# Patient Record
Sex: FEMALE | Race: ASIAN | NOT HISPANIC OR LATINO | Employment: FULL TIME | URBAN - METROPOLITAN AREA
[De-identification: names, ages, dates, MRNs, and addresses within clinical notes are randomized per-mention and may not be internally consistent; named-entity substitution may affect disease eponyms.]

---

## 2018-10-15 LAB
ABO + RH BLD: NORMAL
ABO + RH BLD: NORMAL
HBV SURFACE AG SERPL QL IA: NORMAL
HIV 1+2 AB+HIV1 P24 AG SERPL QL IA: NORMAL
RUBELLA ANTIBODY IGG QUANTITATIVE: NORMAL IU/ML
TREPONEMA ANTIBODIES: NORMAL

## 2019-03-14 ENCOUNTER — HOSPITAL ENCOUNTER (INPATIENT)
Facility: CLINIC | Age: 39
LOS: 4 days | Discharge: HOME HEALTH | End: 2019-03-18
Attending: OBSTETRICS & GYNECOLOGY | Admitting: OBSTETRICS & GYNECOLOGY
Payer: OTHER GOVERNMENT

## 2019-03-14 ENCOUNTER — ANESTHESIA (OUTPATIENT)
Dept: OBGYN | Facility: CLINIC | Age: 39
End: 2019-03-14
Payer: OTHER GOVERNMENT

## 2019-03-14 ENCOUNTER — ANESTHESIA EVENT (OUTPATIENT)
Dept: OBGYN | Facility: CLINIC | Age: 39
End: 2019-03-14
Payer: OTHER GOVERNMENT

## 2019-03-14 DIAGNOSIS — Z98.891 S/P CESAREAN SECTION: Primary | ICD-10-CM

## 2019-03-14 LAB
ABO + RH BLD: NORMAL
ABO + RH BLD: NORMAL
BLD GP AB SCN SERPL QL: NORMAL
BLOOD BANK CMNT PATIENT-IMP: NORMAL
RUPTURE OF FETAL MEMBRANES BY ROM PLUS: POSITIVE
SPECIMEN EXP DATE BLD: NORMAL
T PALLIDUM AB SER QL: NONREACTIVE

## 2019-03-14 PROCEDURE — 27210794 ZZH OR GENERAL SUPPLY STERILE: Performed by: OBSTETRICS & GYNECOLOGY

## 2019-03-14 PROCEDURE — 71000012 ZZH RECOVERY PHASE 1 LEVEL 1 FIRST HR: Performed by: OBSTETRICS & GYNECOLOGY

## 2019-03-14 PROCEDURE — 59025 FETAL NON-STRESS TEST: CPT

## 2019-03-14 PROCEDURE — 25000132 ZZH RX MED GY IP 250 OP 250 PS 637

## 2019-03-14 PROCEDURE — 37000008 ZZH ANESTHESIA TECHNICAL FEE, 1ST 30 MIN: Performed by: OBSTETRICS & GYNECOLOGY

## 2019-03-14 PROCEDURE — 86900 BLOOD TYPING SEROLOGIC ABO: CPT | Performed by: OBSTETRICS & GYNECOLOGY

## 2019-03-14 PROCEDURE — 0064U ANTB TP TOTAL&RPR IA QUAL: CPT | Performed by: OBSTETRICS & GYNECOLOGY

## 2019-03-14 PROCEDURE — 25000125 ZZHC RX 250: Performed by: NURSE ANESTHETIST, CERTIFIED REGISTERED

## 2019-03-14 PROCEDURE — 80361 OPIATES 1 OR MORE: CPT | Performed by: OBSTETRICS & GYNECOLOGY

## 2019-03-14 PROCEDURE — 25000128 H RX IP 250 OP 636: Performed by: OBSTETRICS & GYNECOLOGY

## 2019-03-14 PROCEDURE — 25800030 ZZH RX IP 258 OP 636: Performed by: OBSTETRICS & GYNECOLOGY

## 2019-03-14 PROCEDURE — 84112 EVAL AMNIOTIC FLUID PROTEIN: CPT | Performed by: OBSTETRICS & GYNECOLOGY

## 2019-03-14 PROCEDURE — 80307 DRUG TEST PRSMV CHEM ANLYZR: CPT | Performed by: OBSTETRICS & GYNECOLOGY

## 2019-03-14 PROCEDURE — 25000128 H RX IP 250 OP 636: Performed by: NURSE ANESTHETIST, CERTIFIED REGISTERED

## 2019-03-14 PROCEDURE — 86850 RBC ANTIBODY SCREEN: CPT | Performed by: OBSTETRICS & GYNECOLOGY

## 2019-03-14 PROCEDURE — G0463 HOSPITAL OUTPT CLINIC VISIT: HCPCS | Mod: 25

## 2019-03-14 PROCEDURE — 25000125 ZZHC RX 250: Performed by: OBSTETRICS & GYNECOLOGY

## 2019-03-14 PROCEDURE — 12000035 ZZH R&B POSTPARTUM

## 2019-03-14 PROCEDURE — 37000009 ZZH ANESTHESIA TECHNICAL FEE, EACH ADDTL 15 MIN: Performed by: OBSTETRICS & GYNECOLOGY

## 2019-03-14 PROCEDURE — 25800030 ZZH RX IP 258 OP 636: Performed by: NURSE ANESTHETIST, CERTIFIED REGISTERED

## 2019-03-14 PROCEDURE — 36000056 ZZH SURGERY LEVEL 3 1ST 30 MIN: Performed by: OBSTETRICS & GYNECOLOGY

## 2019-03-14 PROCEDURE — 25000132 ZZH RX MED GY IP 250 OP 250 PS 637: Performed by: OBSTETRICS & GYNECOLOGY

## 2019-03-14 PROCEDURE — 25000128 H RX IP 250 OP 636

## 2019-03-14 PROCEDURE — 86901 BLOOD TYPING SEROLOGIC RH(D): CPT | Performed by: OBSTETRICS & GYNECOLOGY

## 2019-03-14 PROCEDURE — 36415 COLL VENOUS BLD VENIPUNCTURE: CPT | Performed by: OBSTETRICS & GYNECOLOGY

## 2019-03-14 PROCEDURE — 36000058 ZZH SURGERY LEVEL 3 EA 15 ADDTL MIN: Performed by: OBSTETRICS & GYNECOLOGY

## 2019-03-14 PROCEDURE — 71000013 ZZH RECOVERY PHASE 1 LEVEL 1 EA ADDTL HR: Performed by: OBSTETRICS & GYNECOLOGY

## 2019-03-14 RX ORDER — FENTANYL CITRATE 50 UG/ML
INJECTION, SOLUTION INTRAMUSCULAR; INTRAVENOUS PRN
Status: DISCONTINUED | OUTPATIENT
Start: 2019-03-14 | End: 2019-03-14

## 2019-03-14 RX ORDER — MORPHINE SULFATE 1 MG/ML
INJECTION, SOLUTION EPIDURAL; INTRATHECAL; INTRAVENOUS
Status: DISCONTINUED
Start: 2019-03-14 | End: 2019-03-14 | Stop reason: HOSPADM

## 2019-03-14 RX ORDER — AMLODIPINE BESYLATE 5 MG/1
5 TABLET ORAL DAILY
Status: DISCONTINUED | OUTPATIENT
Start: 2019-03-14 | End: 2019-03-18 | Stop reason: HOSPADM

## 2019-03-14 RX ORDER — CEFAZOLIN SODIUM 2 G/100ML
INJECTION, SOLUTION INTRAVENOUS
Status: DISCONTINUED
Start: 2019-03-14 | End: 2019-03-14 | Stop reason: HOSPADM

## 2019-03-14 RX ORDER — LIDOCAINE 40 MG/G
CREAM TOPICAL
Status: DISCONTINUED | OUTPATIENT
Start: 2019-03-14 | End: 2019-03-18 | Stop reason: HOSPADM

## 2019-03-14 RX ORDER — BUPIVACAINE HYDROCHLORIDE 7.5 MG/ML
INJECTION, SOLUTION INTRASPINAL
Status: COMPLETED
Start: 2019-03-14 | End: 2019-03-14

## 2019-03-14 RX ORDER — SIMETHICONE 80 MG
80 TABLET,CHEWABLE ORAL 4 TIMES DAILY PRN
Status: DISCONTINUED | OUTPATIENT
Start: 2019-03-14 | End: 2019-03-18 | Stop reason: HOSPADM

## 2019-03-14 RX ORDER — HYDROCHLOROTHIAZIDE 25 MG/1
50 TABLET ORAL EVERY 24 HOURS
Status: ON HOLD | COMMUNITY
Start: 2014-11-26 | End: 2021-08-05

## 2019-03-14 RX ORDER — ONDANSETRON 2 MG/ML
INJECTION INTRAMUSCULAR; INTRAVENOUS
Status: DISCONTINUED
Start: 2019-03-14 | End: 2019-03-14 | Stop reason: HOSPADM

## 2019-03-14 RX ORDER — NALOXONE HYDROCHLORIDE 0.4 MG/ML
.1-.4 INJECTION, SOLUTION INTRAMUSCULAR; INTRAVENOUS; SUBCUTANEOUS
Status: DISCONTINUED | OUTPATIENT
Start: 2019-03-14 | End: 2019-03-18 | Stop reason: HOSPADM

## 2019-03-14 RX ORDER — SODIUM CHLORIDE, SODIUM LACTATE, POTASSIUM CHLORIDE, CALCIUM CHLORIDE 600; 310; 30; 20 MG/100ML; MG/100ML; MG/100ML; MG/100ML
INJECTION, SOLUTION INTRAVENOUS CONTINUOUS
Status: DISCONTINUED | OUTPATIENT
Start: 2019-03-14 | End: 2019-03-14 | Stop reason: HOSPADM

## 2019-03-14 RX ORDER — ONDANSETRON 2 MG/ML
INJECTION INTRAMUSCULAR; INTRAVENOUS PRN
Status: DISCONTINUED | OUTPATIENT
Start: 2019-03-14 | End: 2019-03-14

## 2019-03-14 RX ORDER — AMLODIPINE BESYLATE 10 MG/1
10 TABLET ORAL DAILY
COMMUNITY

## 2019-03-14 RX ORDER — PHYTONADIONE 1 MG/.5ML
INJECTION, EMULSION INTRAMUSCULAR; INTRAVENOUS; SUBCUTANEOUS
Status: DISCONTINUED
Start: 2019-03-14 | End: 2019-03-14 | Stop reason: HOSPADM

## 2019-03-14 RX ORDER — ACETAMINOPHEN 325 MG/1
975 TABLET ORAL EVERY 8 HOURS
Status: COMPLETED | OUTPATIENT
Start: 2019-03-14 | End: 2019-03-17

## 2019-03-14 RX ORDER — EPHEDRINE SULFATE 50 MG/ML
5 INJECTION, SOLUTION INTRAMUSCULAR; INTRAVENOUS; SUBCUTANEOUS
Status: DISCONTINUED | OUTPATIENT
Start: 2019-03-14 | End: 2019-03-18 | Stop reason: HOSPADM

## 2019-03-14 RX ORDER — FENTANYL CITRATE 50 UG/ML
INJECTION, SOLUTION INTRAMUSCULAR; INTRAVENOUS
Status: DISCONTINUED
Start: 2019-03-14 | End: 2019-03-14 | Stop reason: HOSPADM

## 2019-03-14 RX ORDER — CEFAZOLIN SODIUM 1 G/3ML
1 INJECTION, POWDER, FOR SOLUTION INTRAMUSCULAR; INTRAVENOUS SEE ADMIN INSTRUCTIONS
Status: DISCONTINUED | OUTPATIENT
Start: 2019-03-14 | End: 2019-03-14 | Stop reason: HOSPADM

## 2019-03-14 RX ORDER — CEFAZOLIN SODIUM 2 G/100ML
2 INJECTION, SOLUTION INTRAVENOUS
Status: COMPLETED | OUTPATIENT
Start: 2019-03-14 | End: 2019-03-14

## 2019-03-14 RX ORDER — OXYTOCIN 10 [USP'U]/ML
10 INJECTION, SOLUTION INTRAMUSCULAR; INTRAVENOUS
Status: DISCONTINUED | OUTPATIENT
Start: 2019-03-14 | End: 2019-03-18 | Stop reason: HOSPADM

## 2019-03-14 RX ORDER — IBUPROFEN 400 MG/1
800 TABLET, FILM COATED ORAL EVERY 6 HOURS PRN
Status: DISCONTINUED | OUTPATIENT
Start: 2019-03-14 | End: 2019-03-18 | Stop reason: HOSPADM

## 2019-03-14 RX ORDER — HYDROCORTISONE 2.5 %
CREAM (GRAM) TOPICAL 3 TIMES DAILY PRN
Status: DISCONTINUED | OUTPATIENT
Start: 2019-03-14 | End: 2019-03-18 | Stop reason: HOSPADM

## 2019-03-14 RX ORDER — OXYTOCIN/0.9 % SODIUM CHLORIDE 30/500 ML
100 PLASTIC BAG, INJECTION (ML) INTRAVENOUS CONTINUOUS
Status: DISCONTINUED | OUTPATIENT
Start: 2019-03-14 | End: 2019-03-18 | Stop reason: HOSPADM

## 2019-03-14 RX ORDER — KETOROLAC TROMETHAMINE 30 MG/ML
30 INJECTION, SOLUTION INTRAMUSCULAR; INTRAVENOUS EVERY 6 HOURS
Status: COMPLETED | OUTPATIENT
Start: 2019-03-14 | End: 2019-03-15

## 2019-03-14 RX ORDER — BUPIVACAINE HYDROCHLORIDE 7.5 MG/ML
INJECTION, SOLUTION INTRASPINAL PRN
Status: DISCONTINUED | OUTPATIENT
Start: 2019-03-14 | End: 2019-03-14

## 2019-03-14 RX ORDER — ONDANSETRON 2 MG/ML
4 INJECTION INTRAMUSCULAR; INTRAVENOUS EVERY 6 HOURS PRN
Status: DISCONTINUED | OUTPATIENT
Start: 2019-03-14 | End: 2019-03-18 | Stop reason: HOSPADM

## 2019-03-14 RX ORDER — CITRIC ACID/SODIUM CITRATE 334-500MG
30 SOLUTION, ORAL ORAL
Status: COMPLETED | OUTPATIENT
Start: 2019-03-14 | End: 2019-03-14

## 2019-03-14 RX ORDER — MORPHINE SULFATE 1 MG/ML
INJECTION, SOLUTION EPIDURAL; INTRATHECAL; INTRAVENOUS PRN
Status: DISCONTINUED | OUTPATIENT
Start: 2019-03-14 | End: 2019-03-14

## 2019-03-14 RX ORDER — ACETAMINOPHEN 325 MG/1
TABLET ORAL
Status: COMPLETED
Start: 2019-03-14 | End: 2019-03-14

## 2019-03-14 RX ORDER — OXYTOCIN/0.9 % SODIUM CHLORIDE 30/500 ML
PLASTIC BAG, INJECTION (ML) INTRAVENOUS CONTINUOUS PRN
Status: DISCONTINUED | OUTPATIENT
Start: 2019-03-14 | End: 2019-03-14

## 2019-03-14 RX ORDER — NALBUPHINE HYDROCHLORIDE 10 MG/ML
2.5-5 INJECTION, SOLUTION INTRAMUSCULAR; INTRAVENOUS; SUBCUTANEOUS EVERY 6 HOURS PRN
Status: DISCONTINUED | OUTPATIENT
Start: 2019-03-14 | End: 2019-03-18 | Stop reason: HOSPADM

## 2019-03-14 RX ORDER — KETOROLAC TROMETHAMINE 30 MG/ML
INJECTION, SOLUTION INTRAMUSCULAR; INTRAVENOUS
Status: DISCONTINUED
Start: 2019-03-14 | End: 2019-03-14 | Stop reason: HOSPADM

## 2019-03-14 RX ORDER — BISACODYL 10 MG
10 SUPPOSITORY, RECTAL RECTAL DAILY PRN
Status: DISCONTINUED | OUTPATIENT
Start: 2019-03-16 | End: 2019-03-18 | Stop reason: HOSPADM

## 2019-03-14 RX ORDER — HYDROMORPHONE HYDROCHLORIDE 1 MG/ML
INJECTION, SOLUTION INTRAMUSCULAR; INTRAVENOUS; SUBCUTANEOUS
Status: COMPLETED
Start: 2019-03-14 | End: 2019-03-14

## 2019-03-14 RX ORDER — MULTIVITAMIN WITH IRON
1 TABLET ORAL DAILY
Status: ON HOLD | COMMUNITY
End: 2019-03-18

## 2019-03-14 RX ORDER — DEXTROSE, SODIUM CHLORIDE, SODIUM LACTATE, POTASSIUM CHLORIDE, AND CALCIUM CHLORIDE 5; .6; .31; .03; .02 G/100ML; G/100ML; G/100ML; G/100ML; G/100ML
INJECTION, SOLUTION INTRAVENOUS CONTINUOUS
Status: DISCONTINUED | OUTPATIENT
Start: 2019-03-14 | End: 2019-03-18 | Stop reason: HOSPADM

## 2019-03-14 RX ORDER — HYDROMORPHONE HYDROCHLORIDE 1 MG/ML
.3-.5 INJECTION, SOLUTION INTRAMUSCULAR; INTRAVENOUS; SUBCUTANEOUS EVERY 30 MIN PRN
Status: DISCONTINUED | OUTPATIENT
Start: 2019-03-14 | End: 2019-03-18 | Stop reason: HOSPADM

## 2019-03-14 RX ORDER — LANOLIN 100 %
OINTMENT (GRAM) TOPICAL
Status: DISCONTINUED | OUTPATIENT
Start: 2019-03-14 | End: 2019-03-18 | Stop reason: HOSPADM

## 2019-03-14 RX ORDER — OXYTOCIN/0.9 % SODIUM CHLORIDE 30/500 ML
340 PLASTIC BAG, INJECTION (ML) INTRAVENOUS CONTINUOUS PRN
Status: DISCONTINUED | OUTPATIENT
Start: 2019-03-14 | End: 2019-03-18 | Stop reason: HOSPADM

## 2019-03-14 RX ORDER — AMOXICILLIN 250 MG
2 CAPSULE ORAL 2 TIMES DAILY PRN
Status: DISCONTINUED | OUTPATIENT
Start: 2019-03-14 | End: 2019-03-18 | Stop reason: HOSPADM

## 2019-03-14 RX ORDER — OXYTOCIN/0.9 % SODIUM CHLORIDE 30/500 ML
PLASTIC BAG, INJECTION (ML) INTRAVENOUS
Status: DISCONTINUED
Start: 2019-03-14 | End: 2019-03-14 | Stop reason: HOSPADM

## 2019-03-14 RX ORDER — AMOXICILLIN 250 MG
1 CAPSULE ORAL 2 TIMES DAILY PRN
Status: DISCONTINUED | OUTPATIENT
Start: 2019-03-14 | End: 2019-03-18 | Stop reason: HOSPADM

## 2019-03-14 RX ORDER — ACETAMINOPHEN 325 MG/1
975 TABLET ORAL ONCE
Status: COMPLETED | OUTPATIENT
Start: 2019-03-14 | End: 2019-03-14

## 2019-03-14 RX ORDER — CITRIC ACID/SODIUM CITRATE 334-500MG
SOLUTION, ORAL ORAL
Status: COMPLETED
Start: 2019-03-14 | End: 2019-03-14

## 2019-03-14 RX ORDER — ERYTHROMYCIN 5 MG/G
OINTMENT OPHTHALMIC
Status: DISCONTINUED
Start: 2019-03-14 | End: 2019-03-14 | Stop reason: HOSPADM

## 2019-03-14 RX ORDER — HYDROCHLOROTHIAZIDE 50 MG/1
50 TABLET ORAL EVERY 24 HOURS
Status: DISCONTINUED | OUTPATIENT
Start: 2019-03-14 | End: 2019-03-18 | Stop reason: HOSPADM

## 2019-03-14 RX ORDER — ACETAMINOPHEN 325 MG/1
650 TABLET ORAL EVERY 4 HOURS PRN
Status: DISCONTINUED | OUTPATIENT
Start: 2019-03-17 | End: 2019-03-18 | Stop reason: HOSPADM

## 2019-03-14 RX ORDER — OXYCODONE HYDROCHLORIDE 5 MG/1
5-10 TABLET ORAL
Status: DISCONTINUED | OUTPATIENT
Start: 2019-03-14 | End: 2019-03-18 | Stop reason: HOSPADM

## 2019-03-14 RX ADMIN — Medication 0.5 MG: at 20:19

## 2019-03-14 RX ADMIN — CEFAZOLIN SODIUM 2 G: 2 INJECTION, SOLUTION INTRAVENOUS at 09:13

## 2019-03-14 RX ADMIN — KETOROLAC TROMETHAMINE 30 MG: 30 INJECTION, SOLUTION INTRAMUSCULAR; INTRAVENOUS at 16:01

## 2019-03-14 RX ADMIN — Medication 0.3 MG: at 11:39

## 2019-03-14 RX ADMIN — KETOROLAC TROMETHAMINE 30 MG: 30 INJECTION, SOLUTION INTRAMUSCULAR; INTRAVENOUS at 10:36

## 2019-03-14 RX ADMIN — FENTANYL CITRATE 20 MCG: 50 INJECTION, SOLUTION INTRAMUSCULAR; INTRAVENOUS at 09:14

## 2019-03-14 RX ADMIN — SODIUM CITRATE AND CITRIC ACID MONOHYDRATE 30 ML: 500; 334 SOLUTION ORAL at 08:55

## 2019-03-14 RX ADMIN — BUPIVACAINE HYDROCHLORIDE IN DEXTROSE 10.5 MG: 7.5 INJECTION, SOLUTION SUBARACHNOID at 09:14

## 2019-03-14 RX ADMIN — ACETAMINOPHEN 975 MG: 325 TABLET ORAL at 08:05

## 2019-03-14 RX ADMIN — PHENYLEPHRINE HYDROCHLORIDE 50 MCG: 10 INJECTION, SOLUTION INTRAMUSCULAR; INTRAVENOUS; SUBCUTANEOUS at 09:27

## 2019-03-14 RX ADMIN — PHENYLEPHRINE HYDROCHLORIDE 200 MCG: 10 INJECTION, SOLUTION INTRAMUSCULAR; INTRAVENOUS; SUBCUTANEOUS at 09:28

## 2019-03-14 RX ADMIN — KETOROLAC TROMETHAMINE 30 MG: 30 INJECTION, SOLUTION INTRAMUSCULAR; INTRAVENOUS at 22:45

## 2019-03-14 RX ADMIN — AZITHROMYCIN MONOHYDRATE 500 MG: 500 INJECTION, POWDER, LYOPHILIZED, FOR SOLUTION INTRAVENOUS at 08:23

## 2019-03-14 RX ADMIN — SODIUM CHLORIDE, POTASSIUM CHLORIDE, SODIUM LACTATE AND CALCIUM CHLORIDE: 600; 310; 30; 20 INJECTION, SOLUTION INTRAVENOUS at 08:19

## 2019-03-14 RX ADMIN — AMLODIPINE BESYLATE 5 MG: 5 TABLET ORAL at 20:47

## 2019-03-14 RX ADMIN — OXYTOCIN-SODIUM CHLORIDE 0.9% IV SOLN 30 UNIT/500ML 100 ML/HR: 30-0.9/5 SOLUTION at 13:31

## 2019-03-14 RX ADMIN — ONDANSETRON 4 MG: 2 INJECTION INTRAMUSCULAR; INTRAVENOUS at 09:02

## 2019-03-14 RX ADMIN — SODIUM CHLORIDE, POTASSIUM CHLORIDE, SODIUM LACTATE AND CALCIUM CHLORIDE 1000 ML: 600; 310; 30; 20 INJECTION, SOLUTION INTRAVENOUS at 07:56

## 2019-03-14 RX ADMIN — ACETAMINOPHEN 975 MG: 325 TABLET, FILM COATED ORAL at 08:05

## 2019-03-14 RX ADMIN — FENTANYL CITRATE 30 MCG: 50 INJECTION, SOLUTION INTRAMUSCULAR; INTRAVENOUS at 09:32

## 2019-03-14 RX ADMIN — SODIUM CHLORIDE, SODIUM LACTATE, POTASSIUM CHLORIDE, CALCIUM CHLORIDE AND DEXTROSE MONOHYDRATE: 5; 600; 310; 30; 20 INJECTION, SOLUTION INTRAVENOUS at 19:40

## 2019-03-14 RX ADMIN — ACETAMINOPHEN 975 MG: 325 TABLET, FILM COATED ORAL at 16:00

## 2019-03-14 RX ADMIN — Medication 0.3 MG: at 16:01

## 2019-03-14 RX ADMIN — Medication 30 ML: at 08:55

## 2019-03-14 RX ADMIN — MORPHINE SULFATE 0.15 MG: 1 INJECTION, SOLUTION EPIDURAL; INTRATHECAL; INTRAVENOUS at 09:14

## 2019-03-14 RX ADMIN — OXYTOCIN-SODIUM CHLORIDE 0.9% IV SOLN 30 UNIT/500ML 340 ML/HR: 30-0.9/5 SOLUTION at 09:30

## 2019-03-14 RX ADMIN — PHENYLEPHRINE HYDROCHLORIDE 50 MCG: 10 INJECTION, SOLUTION INTRAMUSCULAR; INTRAVENOUS; SUBCUTANEOUS at 09:22

## 2019-03-14 SDOH — HEALTH STABILITY: MENTAL HEALTH: HOW OFTEN DO YOU HAVE A DRINK CONTAINING ALCOHOL?: NEVER

## 2019-03-14 ASSESSMENT — MIFFLIN-ST. JEOR
SCORE: 1103.66
SCORE: 1117.27

## 2019-03-14 NOTE — PLAN OF CARE
Dr. Ravi at bedside at 0730, reviewed strip and discussed POC with pt and . Decision made to proceed with primary  section and consent obtained by Dr. Ravi. Will prep pt for surgery.

## 2019-03-14 NOTE — ANESTHESIA CARE TRANSFER NOTE
Patient: Maegan German    Procedure(s):   SECTION    Diagnosis: pregnancy  Diagnosis Additional Information: No value filed.    Anesthesia Type:   Spinal     Note:  Airway :Room Air  Patient transferred to:PACU  Handoff Report: Identifed the Patient, Identified the Reponsible Provider, Reviewed the pertinent medical history, Discussed the surgical course, Reviewed Intra-OP anesthesia mangement and issues during anesthesia, Set expectations for post-procedure period and Allowed opportunity for questions and acknowledgement of understanding      Vitals: (Last set prior to Anesthesia Care Transfer)    CRNA VITALS  3/14/2019 0926 - 3/14/2019 1004      3/14/2019             Resp Rate (set):  10                Electronically Signed By: JULIAN Vanegas CRNA  2019  10:04 AM

## 2019-03-14 NOTE — OP NOTE
OB  Delivery Note    Maegan German MRN# [id number]   Age: 38 year old YOB: 1980     Pre-operative Diagnosis:   1. IUP at 36w5d   2.   3. PPROM  4. PTL    Post-operative Diagnosis: Same    Procedure done: PLTCS    Surgeon: Heide Ravi     Assist: CST    Anesthesia:       Complications:  None    QBL: 183 mL    UOP-  Clear urine, not yet measured    IV fluids- see anesthesia record    Drains- lomax catheter    Findings:  Viable femmale infant in vertex position weighing 6 lbs 2 oz with APGARS of 8/9      3-V cord  clear amniotic fluid  Normal-appearing placenta   Normal tubes and ovaries bilaterally.    Specimens:  ID Type Source Tests Collected by Time Destination   1 :  Cord blood Blood OR DOCUMENTATION ONLY Heide Ravi MD 3/14/2019  9:38 AM    2 :  Tissue Umbilical Cord OR DOCUMENTATION ONLY Heide Ravi MD 3/14/2019  9:38 AM        Complications:  None      Indication and Consent:  This is a 38 year old  admitted at 36w5d for PPROM and PTL. She had decided early on her pregnancy that she desired elective PLTCS.The risks of  section including bleeding, infection, injury to surrounding structures, injury to the baby, need for reoperation, need for blood transfusion were discussed with the patient. She expressed understanding and consented to proceed with PLTCS     Procedure Details:    The patient was brought to the operating room with IV fluids running. IV antibiotics were given for infection prophylaxis. PCBs were placed on the lower extremities and found to be working prior to onset of the case. Spinal anesthesia  was administered and found to be adequate. A Lomax catheter was placed to gravity.    The patient was prepped and draped in the dorsal supine position was a leftward tilt. A timeout was performed to identify the patient and procedure.    A Pfannenstiel incision was made approximately two finger breadths above the pubic symphysis with  the scalpel. It was carried down through the subcutaneous tissue to the fascia with the scalpel and Bovie.  The fascia was incised with the Bovie and the fascial incision was extended laterally with the Schafer scissors. The superior aspect of the fascia was grasped with Kocher clamps and  off the underlying musculature with fingertips bluntly laterally and with Schafer scissors down the midline. The inferior aspect of the fascia was similarly grasped and dissected off the underlying musculature.  Preperitoneal fatty tissue was  off with fingertips until the peritoneum could be entered bluntly with fingertips. The peritoneal incision was extended laterally with blunt traction with careful visualization of the bladder.  The bladder blade was inserted to keep the bladder out of the operative field.     The uterus was palpated and felt to be midline. The scalpel was used to make a transverse incision in the lower uterine segment with care to avoid the bladder.  The incision was entered bluntly with fingertips and extended laterally with cephalo-caudad traction.     The infant was found to be in vertex ROT presentation with fetal head low in the pelvi. The head of the fetus grasped and traction was applied to release suction. The head was elevated to the incision. Fundal pressure was applied from above and the infant delivered without difficulty.  The cord was clamped and cut after one minute and the infant was passed off to the pediatrics team. The placenta was delivered with manual traction.     The uterus was then exteriorized. The inside of the uterus was wiped with a lap sponge to ensure removal of placental membranes. The hysterotomy was closed with 0 Vicryl in a running locked fashion. An imbricating stitch of 0 Vicryl was placed. Hemostasis was achieved.     The bladder blade was removed. The uterus was replaced in the pelvis. The bladder blade was replaced. Lap sponges were used to remove blood and  clot from the pelvic gutters. The hysterotomy was again visualized and found to have good hemostasis. The bladder blade was removed.     The fascia was closed with running sutures of 0 Vicryl. The incision was irrigated with warm normal saline. The skin was closed in a subcuticular fashion with 4-0 Monocryl.  The patient tolerated the procedure well. All counts were correct x2. The patient and the baby were returned to the recovery room in a stable condition.       Rupture date/time:     Rupture type:  Spontaneous rupture of membranes prior to induction     Delivery/Placenta Date and Time    Delivery Date:  3/14/19 Delivery Time:   9:29 AM      Delivery (Maternal) (Provider to Complete) ([id number])       Blood Loss  Mother: Maegan German #[id number]   Start of Mother's Information    IO Blood Loss  03/13/19 2129 - 03/14/19 0950    Total Surgical QBL Blood Loss (mL) Hospital Encounter 183 mL    Total  183 mL         End of Mother's Information  Mother: Maegan German #[id number]              Heide Ravi MD

## 2019-03-14 NOTE — PLAN OF CARE
Patient transferred to [facility] about 1155,oriented to room,call light,safety measures reviewed,FF/U with scant flow,pain control with tylenol&I/v tordal,RA O2 Sat 99 to 100%,VSS,baby bottle feeding ok.Will continue to monitor.

## 2019-03-14 NOTE — PLAN OF CARE
Data: Maegan German transferred to 405 via bed at 1155. Baby transferred via parent's arms.  Action: Receiving unit notified of transfer: Yes. Patient and family notified of room change. Report given to Xiomara RN at 1200. Belongings sent to receiving unit. Accompanied by Registered Nurse. Oriented patient to surroundings. Call light within reach. ID bands double-checked with receiving RN.  Response: Patient tolerated transfer and is stable.

## 2019-03-14 NOTE — H&P
"[facility]   LABOR ADMIT    Maegan German MRN# [id number]  Age: 38 year old YOB: 1980    Date of Admission: 3/14/2019    Primary care provider: Heide Ravi     HPI: This is a 38 year old  at 36w5d by LMP c/w presenting after PROM at 0530. Her pregnancy was complicated by CHTN well-controlled on medications. She started prenatal care late around 15 weeks as she did not realize she was pregnant prior. She has been followed with serial growth scans, with the most recent showing EFW 4#6 oz approximately one month ago. She reports LOF at 0530. No VB. Contractions started on the way to the hospital and are occurring every 2-3 minutes. She is coping with them well. She has desired an elective  throughout her pregnancy due to reported family history of significant obstetric tears with longterm sequelae. She has been counseled extensively multiple times regarding vaginal delivery vs .      Past Medical History:  CHTN     Past Surgical History:  No significant history     Social History:  This patient has no significant social history     Family History:  Reports family history of significant obstetric lacerations with longterm sequelae     Allergies:  All allergies reviewed and addressed    Physical Exam:  Temp 99.5  F (37.5  C) (Temporal)   Ht 1.499 m (4' 11\")   Wt 50.8 kg (112 lb)   BMI 22.62 kg/m    Gen: NAD  Abd: soft, NT, ND, gravid  Ext: NT, nonedematous  SVE:     FHT:   [place]:     Prenatal Labs:   Lab Results   Component Value Date    ABO O 10/15/2018    RH Pos 10/15/2018    HEPBANG NON-REACTIVE 10/15/2018       GBS Status:   No results found for: GBS    Assessment:  This is a 38 year old  at 36w5d with h/o CHTN admitted to periop for elective  due to PROM.     Again discussed  vs . Discussed risks of  including bleeding, infection, injury to surrounding structures, injury to the baby, need for blood transfusion, " need for additional procedures including hysterectomy, need for reoperation. She expressed understanding and consented to proceed with .    She states allergy to PCN but is unsure what the allergy is.     Blood pressures are mildly elevated. She has CHTN and is on meds. She has no PEC sx.       Heide Ravi MD

## 2019-03-14 NOTE — ANESTHESIA PREPROCEDURE EVALUATION
"Anesthesia Pre-Procedure Evaluation    Patient: Maegan German   MRN: [id number] : 1980          Preoperative Diagnosis: pregnancy    Procedure(s):   SECTION    No past medical history on file.  Past Surgical History:   Procedure Laterality Date     HYPERTENSION MG         Anesthesia Evaluation     .             ROS/MED HX    ENT/Pulmonary:  - neg pulmonary ROS    (-) sleep apnea   Neurologic:  - neg neurologic ROS     Cardiovascular:     (+) hypertension----. : . . . :. .       METS/Exercise Tolerance:     Hematologic:  - neg hematologic  ROS       Musculoskeletal:  - neg musculoskeletal ROS       GI/Hepatic:  - neg GI/hepatic ROS      (-) GERD   Renal/Genitourinary:  - ROS Renal section negative       Endo:  - neg endo ROS       Psychiatric:  - neg psychiatric ROS       Infectious Disease:  - neg infectious disease ROS       Malignancy:         Other:                          Physical Exam  Normal systems: dental    Airway   Mallampati: II  TM distance: >3 FB  Neck ROM: full    Dental     Cardiovascular   Rhythm and rate: regular      Pulmonary    breath sounds clear to auscultation            No results found for: WBC, HGB, HCT, PLT, CRP, SED, NA, POTASSIUM, CHLORIDE, CO2, BUN, CR, GLC, MARIBELL, PHOS, MAG, ALBUMIN, PROTTOTAL, ALT, AST, GGT, ALKPHOS, BILITOTAL, BILIDIRECT, LIPASE, AMYLASE, JIMBO, PTT, INR, FIBR, TSH, T4, T3, HCG, HCGS, CKTOTAL, CKMB, TROPN    Preop Vitals  BP Readings from Last 3 Encounters:   No data found for BP    Pulse Readings from Last 3 Encounters:   No data found for Pulse      Resp Readings from Last 3 Encounters:   No data found for Resp    SpO2 Readings from Last 3 Encounters:   No data found for SpO2      Temp Readings from Last 1 Encounters:   19 37.5  C (99.5  F) (Temporal)    Ht Readings from Last 1 Encounters:   19 1.499 m (4' 11\")      Wt Readings from Last 1 Encounters:   19 50.8 kg (112 lb)    Estimated body mass index is 22.62 kg/m  as " "calculated from the following:    Height as of this encounter: 1.499 m (4' 11\").    Weight as of this encounter: 50.8 kg (112 lb).       Anesthesia Plan      History & Physical Review  History and physical reviewed and following examination; no interval change.    ASA Status:  2 .    NPO Status:  > 8 hours    Plan for Spinal   PONV prophylaxis:  Ondansetron (or other 5HT-3)  Bupivacaine, fentanyl and duramorph for spinal      Postoperative Care  Postoperative pain management:  Neuraxial analgesia.      Consents  Anesthetic plan, risks, benefits and alternatives discussed with:  Patient.  Use of blood products discussed: Yes.   Use of blood products discussed with Patient.  Consented to blood products.  .                 Robin Rodriguez MD  "

## 2019-03-14 NOTE — PLAN OF CARE
0639  Patient arrived to maternal assessment center ambulatory with adult female.    Patient reports reason for visit is water broke.  Patient to bathroom to void then to MAC room to change into gown.      G 1 P 0     36 weeks 5 days gestation    Prenatal record reviewed.        Verbal consent for EFM.     EFM applied for fetal well being with leaking fluid since 530. Patient states the fluid was clear.     Uterine assessment completed, non-tender and palpates soft between contractions.      Triage/Arrival assessment completed.     Cervical exam deferred awaiting Dr Ravi to see patient.      Patient reports fetal movement is present.  Patient reports chronic hypertension, blood pressures noted.  See labor flowsheets.      Patient and Dr Ravi to decide with patient weather to move forward with a  section to try vaginal delivery.      Awaiting ROM+ results.      07 Report to Rupinder PETTIT RN.  Care Transferred.

## 2019-03-14 NOTE — ANESTHESIA PROCEDURE NOTES
Peripheral nerve/Neuraxial procedure note : intrathecal  Pre-Procedure  Performed by Robin Rodriguez MD  Location: OR      Pre-Anesthestic Checklist: patient identified, IV checked, risks and benefits discussed, informed consent, monitors and equipment checked, pre-op evaluation and at physician/surgeon's request    Timeout  Correct Patient: Yes   Correct Procedure: Yes   Correct Site: Yes   Correct Laterality: N/A   Correct Position: Yes   Site Marked: N/A   .   Procedure Documentation    .    Procedure:    Intrathecal.  Insertion Site:L3-4  (midline approach)      Patient Prep;mask, sterile gloves, chlorhexidine gluconate and isopropyl alcohol, patient draped.  .  Needle: Sang tip Spinal Needle (gauge): 25  Spinal/LP Needle Length (inches): 3.5 # of attempts: 1 and  # of redirects:  2 Introducer used Introducer: 20 G .       Assessment/Narrative  Paresthesias: No.  .  .  clear CSF fluid removed . Comments:  Duramorph and fentanyl dosed along with bupivacaine.  T6 sensory level confirmed bilaterally prior to incision.   The patient was prepped and draped in a sterile fashion.  Topical anesthesia was injected subcutaneously using 1% lidocaine.  A 25G needle was advanced via a 20 G introducer at the the L3-4 level.  Clear CSF obtained, with birefringence on aspiration.  CSF freely aspirated after injection of 10. mg bupivacaine.  No paresthesias reported by patient, who tolerated the procedure well.

## 2019-03-15 LAB — HGB BLD-MCNC: 9.6 G/DL (ref 11.7–15.7)

## 2019-03-15 PROCEDURE — 36415 COLL VENOUS BLD VENIPUNCTURE: CPT | Performed by: OBSTETRICS & GYNECOLOGY

## 2019-03-15 PROCEDURE — 25000128 H RX IP 250 OP 636: Performed by: OBSTETRICS & GYNECOLOGY

## 2019-03-15 PROCEDURE — 12000035 ZZH R&B POSTPARTUM

## 2019-03-15 PROCEDURE — 85018 HEMOGLOBIN: CPT | Performed by: OBSTETRICS & GYNECOLOGY

## 2019-03-15 PROCEDURE — 25000132 ZZH RX MED GY IP 250 OP 250 PS 637: Performed by: OBSTETRICS & GYNECOLOGY

## 2019-03-15 RX ADMIN — ACETAMINOPHEN 975 MG: 325 TABLET, FILM COATED ORAL at 15:48

## 2019-03-15 RX ADMIN — ACETAMINOPHEN 975 MG: 325 TABLET, FILM COATED ORAL at 08:08

## 2019-03-15 RX ADMIN — OXYCODONE HYDROCHLORIDE 10 MG: 5 TABLET ORAL at 23:40

## 2019-03-15 RX ADMIN — OXYCODONE HYDROCHLORIDE 10 MG: 5 TABLET ORAL at 19:59

## 2019-03-15 RX ADMIN — IBUPROFEN 800 MG: 400 TABLET ORAL at 22:38

## 2019-03-15 RX ADMIN — SENNOSIDES AND DOCUSATE SODIUM 2 TABLET: 8.6; 5 TABLET ORAL at 19:59

## 2019-03-15 RX ADMIN — OXYCODONE HYDROCHLORIDE 5 MG: 5 TABLET ORAL at 10:07

## 2019-03-15 RX ADMIN — IBUPROFEN 800 MG: 400 TABLET ORAL at 15:48

## 2019-03-15 RX ADMIN — OXYCODONE HYDROCHLORIDE 10 MG: 5 TABLET ORAL at 13:03

## 2019-03-15 RX ADMIN — SIMETHICONE CHEW TAB 80 MG 80 MG: 80 TABLET ORAL at 23:42

## 2019-03-15 RX ADMIN — IBUPROFEN 800 MG: 400 TABLET ORAL at 10:07

## 2019-03-15 RX ADMIN — HYDROCHLOROTHIAZIDE 50 MG: 50 TABLET ORAL at 00:10

## 2019-03-15 RX ADMIN — ACETAMINOPHEN 975 MG: 325 TABLET, FILM COATED ORAL at 23:40

## 2019-03-15 RX ADMIN — SENNOSIDES AND DOCUSATE SODIUM 2 TABLET: 8.6; 5 TABLET ORAL at 10:07

## 2019-03-15 RX ADMIN — ACETAMINOPHEN 975 MG: 325 TABLET, FILM COATED ORAL at 00:10

## 2019-03-15 RX ADMIN — AMLODIPINE BESYLATE 5 MG: 5 TABLET ORAL at 19:59

## 2019-03-15 RX ADMIN — KETOROLAC TROMETHAMINE 30 MG: 30 INJECTION, SOLUTION INTRAMUSCULAR; INTRAVENOUS at 04:21

## 2019-03-15 RX ADMIN — OXYCODONE HYDROCHLORIDE 10 MG: 5 TABLET ORAL at 16:48

## 2019-03-15 NOTE — PLAN OF CARE
VSS. Pain controlled well with Oxycodone, Ibuprofen and Tylenol. Using ABD binder. Bottle-feeding. Independent with cares. Questions answered. Will continue to monitor.

## 2019-03-15 NOTE — PLAN OF CARE
VSS. Fundus firm, vaginal bleeding scant. Incision C/D/I. IV SL. Up to bathroom with assist, tolerated well. Walker patent, adequate urine output. Pain well controlled with tylenol and toradol.  Bottle feeding baby. Questions encouraged. Meeting expected outcomes per CPG during this shift. Continue to monitor.

## 2019-03-15 NOTE — PLAN OF CARE
VSS. Incision C/D/I. Up to bathroom with assist, tolerated well. Ambulated in the hallway with SBA. Pain well controlled, requesting prn pain med's as needed. Walker is patent and draining adequate amounts. D5LR infusing at 125 mL/hr. Tolerating a regular diet. Bottle feeding  formula. Working on  cares with assist from writer. Spouse at the bedside and supportive. Will continue to monitor and notify MD as needed.

## 2019-03-15 NOTE — PROGRESS NOTES
"Maegan German  March 15, 2019   POD1 s/p elective PLTCS after PPROM    S: 38 year old  delivered at 36w5d   Doing well without complaints.  Sore but medication helping.   Lochia minimal.   Ambulating.  Urinating without issues.  Passing flatus.  Breastfeeding.    O: /81   Pulse 82   Temp 98.4  F (36.9  C) (Oral)   Resp 14   Ht 1.499 m (4' 11\")   Wt 50.8 kg (112 lb)   SpO2 100%   Breastfeeding? Unknown   BMI 22.62 kg/m    Gen: NAD  Abd: soft, NT, ND, FF 2 below U  Incision: bandage c/d/i  Ext: NT, nonedematous    Hemoglobin   Date Value Ref Range Status   03/15/2019 9.6 (L) 11.7 - 15.7 g/dL Final   ]    A/P:  38 year old  with CHTN POD1 s/p elective PLTCS after PPROM at 36+5    CHTN  - normotensive  - no PEC sx   - adequate UOP  - continue home meds as ordered    Postpartum  - ibuprofen,Tylenol, and oxycodone PRN pain  - support breastfeeding  - monitor lochia  - encourage ambulation  - regular diet  - incision appropriate  - routine PP care  - anticipate discharge to home POD3    Heide Ravi MD  Text Page (8am - 5pm)  "

## 2019-03-16 PROCEDURE — 12000035 ZZH R&B POSTPARTUM

## 2019-03-16 PROCEDURE — 25000132 ZZH RX MED GY IP 250 OP 250 PS 637: Performed by: OBSTETRICS & GYNECOLOGY

## 2019-03-16 RX ADMIN — SENNOSIDES AND DOCUSATE SODIUM 2 TABLET: 8.6; 5 TABLET ORAL at 09:27

## 2019-03-16 RX ADMIN — ACETAMINOPHEN 975 MG: 325 TABLET, FILM COATED ORAL at 23:18

## 2019-03-16 RX ADMIN — OXYCODONE HYDROCHLORIDE 10 MG: 5 TABLET ORAL at 02:41

## 2019-03-16 RX ADMIN — IBUPROFEN 800 MG: 400 TABLET ORAL at 04:34

## 2019-03-16 RX ADMIN — SIMETHICONE CHEW TAB 80 MG 80 MG: 80 TABLET ORAL at 20:18

## 2019-03-16 RX ADMIN — SIMETHICONE CHEW TAB 80 MG 80 MG: 80 TABLET ORAL at 09:27

## 2019-03-16 RX ADMIN — IBUPROFEN 800 MG: 400 TABLET ORAL at 20:13

## 2019-03-16 RX ADMIN — OXYCODONE HYDROCHLORIDE 10 MG: 5 TABLET ORAL at 12:52

## 2019-03-16 RX ADMIN — SENNOSIDES AND DOCUSATE SODIUM 2 TABLET: 8.6; 5 TABLET ORAL at 20:13

## 2019-03-16 RX ADMIN — OXYCODONE HYDROCHLORIDE 10 MG: 5 TABLET ORAL at 06:38

## 2019-03-16 RX ADMIN — ACETAMINOPHEN 975 MG: 325 TABLET, FILM COATED ORAL at 17:11

## 2019-03-16 RX ADMIN — OXYCODONE HYDROCHLORIDE 10 MG: 5 TABLET ORAL at 17:11

## 2019-03-16 RX ADMIN — IBUPROFEN 800 MG: 400 TABLET ORAL at 12:52

## 2019-03-16 RX ADMIN — AMLODIPINE BESYLATE 5 MG: 5 TABLET ORAL at 20:13

## 2019-03-16 RX ADMIN — HYDROCHLOROTHIAZIDE 50 MG: 50 TABLET ORAL at 19:11

## 2019-03-16 RX ADMIN — OXYCODONE HYDROCHLORIDE 10 MG: 5 TABLET ORAL at 09:27

## 2019-03-16 RX ADMIN — ACETAMINOPHEN 975 MG: 325 TABLET, FILM COATED ORAL at 09:27

## 2019-03-16 RX ADMIN — OXYCODONE HYDROCHLORIDE 10 MG: 5 TABLET ORAL at 23:17

## 2019-03-16 RX ADMIN — OXYCODONE HYDROCHLORIDE 10 MG: 5 TABLET ORAL at 20:13

## 2019-03-16 NOTE — PROVIDER NOTIFICATION
Per patient request, RN spoke with Dr Bunch regarding hydrochlorothiazide. Pt would like diuretic, but does not meet parameters per order. Ok to give hydrochlorothiazide and hold amlodipine if BP<120/80.  Orders updated.

## 2019-03-16 NOTE — PLAN OF CARE
VSS. Pain controlled well with oxycodone, Ibuprofen and Tylenol. Using abd binder, heat packs and mylicon for gas pain. Pt emptying bladder well and ambulating in halls. Independent with cares. Questions answered. Will continue to monitor.

## 2019-03-16 NOTE — PLAN OF CARE
VSS, fundus is firm at U/1, scant flow, no clots.  Incision is approximated, steri-strips intact, no drainage, abdominal binder in place.  Pain controlled with Tylenol, Ibuprofen and Oxy 10mg.  Independent with cares.   is at bedside and supportive.  Will continue to monitor and support.

## 2019-03-16 NOTE — PROGRESS NOTES
OB  Section Progress Note    Patient name: Maegan German  : 1980  Admit date: 3/14/2019  MRN: [id number]  Acct: [id number]      Assessment/Plan:  Maegan German is a  who is POD#2 from PLTCS.     cHTN:  - HD stable, normotensive  - Asymptomatic, no si/sx preeclampsia  - Home meds ordered (hydrochlorothiazide held yesterday but pt requests to receive for diuresis to help w/ swelling)    General PP:  - POD#1 Hgb 9.6, approp decline  - Blood type: O pos, no need for Rhogam  - VFI; breast feeding  - Cont routine PP care. Anticipate d/c home by POD#3      Subjective:  The patient did well overnight. There were no acute issues. Her pain is well controlled. She notes appropriate lochia. She is tolerating a regular diet well without nausea or vomiting. She has ambulated. She has passed flatus. She is voiding without difficulty. She denies dizziness, lightheadedness, headache, vision changes, chest pain, shortness of breath, RUQ pain, calf pain, or other complaints on ROS.    Objective:  Vitals:  Temp:  [98.1  F (36.7  C)-98.9  F (37.2  C)] 98.9  F (37.2  C)  Pulse:  [81-90] 81  Heart Rate:  [] 97  Resp:  [16] 16  BP: (109-138)/(70-85) 110/75  SpO2:  [100 %] 100 %    Exam:  General: no acute distress  Cardiovascular: HD stable, pulses intact  Pulmonary: no respiratory difficulty, normal non-labored breathing  Abdomen: soft, appropriatly tender to palpation, non-distended  Uterus: fundus firm at U   Incision: C/D/I, well approximated with suture  Extremities: BL lower extremities non-tender, no palpable cords  Psych: mood appropriate    Labs:  Hemoglobin   Date Value Ref Range Status   03/15/2019 9.6 (L) 11.7 - 15.7 g/dL Final   ]      Manjit Bunch MD  3/16/2019, 8:17 AM

## 2019-03-17 PROCEDURE — 12000035 ZZH R&B POSTPARTUM

## 2019-03-17 PROCEDURE — 25000132 ZZH RX MED GY IP 250 OP 250 PS 637: Performed by: OBSTETRICS & GYNECOLOGY

## 2019-03-17 RX ORDER — POLYETHYLENE GLYCOL 3350 17 G/17G
17 POWDER, FOR SOLUTION ORAL DAILY PRN
Status: DISCONTINUED | OUTPATIENT
Start: 2019-03-17 | End: 2019-03-18 | Stop reason: HOSPADM

## 2019-03-17 RX ADMIN — SIMETHICONE CHEW TAB 80 MG 80 MG: 80 TABLET ORAL at 20:33

## 2019-03-17 RX ADMIN — POLYETHYLENE GLYCOL 3350 17 G: 17 POWDER, FOR SOLUTION ORAL at 12:39

## 2019-03-17 RX ADMIN — ACETAMINOPHEN 975 MG: 325 TABLET, FILM COATED ORAL at 08:13

## 2019-03-17 RX ADMIN — HYDROCHLOROTHIAZIDE 50 MG: 50 TABLET ORAL at 20:33

## 2019-03-17 RX ADMIN — OXYCODONE HYDROCHLORIDE 10 MG: 5 TABLET ORAL at 15:08

## 2019-03-17 RX ADMIN — SENNOSIDES AND DOCUSATE SODIUM 2 TABLET: 8.6; 5 TABLET ORAL at 08:13

## 2019-03-17 RX ADMIN — SENNOSIDES AND DOCUSATE SODIUM 2 TABLET: 8.6; 5 TABLET ORAL at 20:33

## 2019-03-17 RX ADMIN — SIMETHICONE CHEW TAB 80 MG 80 MG: 80 TABLET ORAL at 08:14

## 2019-03-17 RX ADMIN — OXYCODONE HYDROCHLORIDE 10 MG: 5 TABLET ORAL at 23:44

## 2019-03-17 RX ADMIN — IBUPROFEN 800 MG: 400 TABLET ORAL at 23:44

## 2019-03-17 RX ADMIN — IBUPROFEN 800 MG: 400 TABLET ORAL at 08:14

## 2019-03-17 RX ADMIN — AMLODIPINE BESYLATE 5 MG: 5 TABLET ORAL at 20:33

## 2019-03-17 RX ADMIN — IBUPROFEN 800 MG: 400 TABLET ORAL at 02:26

## 2019-03-17 RX ADMIN — OXYCODONE HYDROCHLORIDE 10 MG: 5 TABLET ORAL at 20:33

## 2019-03-17 RX ADMIN — OXYCODONE HYDROCHLORIDE 10 MG: 5 TABLET ORAL at 11:33

## 2019-03-17 RX ADMIN — OXYCODONE HYDROCHLORIDE 10 MG: 5 TABLET ORAL at 05:27

## 2019-03-17 RX ADMIN — IBUPROFEN 800 MG: 400 TABLET ORAL at 15:08

## 2019-03-17 RX ADMIN — OXYCODONE HYDROCHLORIDE 10 MG: 5 TABLET ORAL at 02:26

## 2019-03-17 RX ADMIN — OXYCODONE HYDROCHLORIDE 10 MG: 5 TABLET ORAL at 08:14

## 2019-03-17 NOTE — PROGRESS NOTES
OB  Section Progress Note    Patient name: Maegan German  : 1980  Admit date: 3/14/2019  MRN: [id number]  Acct: [id number]      Assessment/Plan:  Maegan German is a  who is POD#3 from PLTCS.     cHTN:  - HD stable, normotensive   - Asymptomatic, no si/sx preeclampsia  - Home meds ordered     General PP:  - POD#1 Hgb 9.6, approp decline  - Blood type: O pos, no need for Rhogam  - VFI; breast feeding  - Cont routine PP care.   - Add miralax for GI regimen today. Continue ambulation.     Discharge home anticipated for tomorrow.       Subjective:  The patient reports difficulty overnight with pain control. Feels very swollen with abd distended. Is eating ok without nausea. Passing small amount of flatus. No BM yet. Pain slightly better this AM.   She is voiding without difficulty. She denies dizziness, lightheadedness, headache, vision changes, chest pain, shortness of breath, RUQ pain, calf pain, or other complaints on ROS.    Objective:  Vitals:  Temp:  [97.8  F (36.6  C)-98  F (36.7  C)] 98  F (36.7  C)  Heart Rate:  [] 92  Resp:  [16] 16  BP: (116-128)/(81-88) 125/85    Exam:  General: no acute distress  Cardiovascular: HD stable, pulses intact  Pulmonary: no respiratory difficulty, normal non-labored breathing  Abdomen: soft, appropriatly tender to palpation, non-distended. +BS  Uterus: fundus firm at U   Incision: C/D/I, well approximated with suture  Extremities: BL lower extremities non-tender, no palpable cords  Psych: mood appropriate    Labs:  Hemoglobin   Date Value Ref Range Status   03/15/2019 9.6 (L) 11.7 - 15.7 g/dL Final   ]      Liliana Garcia MD  3/16/2019, 8:17 AM

## 2019-03-17 NOTE — PLAN OF CARE
VSS. Pain controlled well with oxycodone, Ibuprofen and Tylenol. Miralax started today. Ambulating well and voiding well. Independent with cares. Questions answered. Will continue to monitor.

## 2019-03-17 NOTE — PLAN OF CARE
Pain controlled with tylenol ,oxycodone and motrin  Vitals stable incision  Steri sterips on passing flatus c/o gas pain  mylicon and heat pack given up ambulating voiding  adequate  baby bottle feeding in nursery tonight   encouraged to call with needs continue to monitor and notify MD as needed

## 2019-03-18 VITALS
BODY MASS INDEX: 22.58 KG/M2 | TEMPERATURE: 97.8 F | HEIGHT: 59 IN | HEART RATE: 81 BPM | OXYGEN SATURATION: 100 % | WEIGHT: 112 LBS | RESPIRATION RATE: 16 BRPM | DIASTOLIC BLOOD PRESSURE: 89 MMHG | SYSTOLIC BLOOD PRESSURE: 119 MMHG

## 2019-03-18 PROCEDURE — 25000132 ZZH RX MED GY IP 250 OP 250 PS 637: Performed by: OBSTETRICS & GYNECOLOGY

## 2019-03-18 RX ORDER — OXYCODONE HYDROCHLORIDE 5 MG/1
5-10 TABLET ORAL
Qty: 20 TABLET | Refills: 0 | Status: ON HOLD | OUTPATIENT
Start: 2019-03-18 | End: 2021-08-05

## 2019-03-18 RX ORDER — IBUPROFEN 800 MG/1
800 TABLET, FILM COATED ORAL EVERY 6 HOURS PRN
Qty: 30 TABLET | Refills: 0 | Status: ON HOLD | OUTPATIENT
Start: 2019-03-18 | End: 2021-08-05

## 2019-03-18 RX ORDER — ACETAMINOPHEN 325 MG/1
650 TABLET ORAL EVERY 4 HOURS PRN
Qty: 100 TABLET | Refills: 0 | Status: ON HOLD | OUTPATIENT
Start: 2019-03-18 | End: 2021-08-05

## 2019-03-18 RX ADMIN — ACETAMINOPHEN 650 MG: 325 TABLET, FILM COATED ORAL at 03:08

## 2019-03-18 RX ADMIN — IBUPROFEN 800 MG: 400 TABLET ORAL at 06:31

## 2019-03-18 RX ADMIN — SENNOSIDES AND DOCUSATE SODIUM 2 TABLET: 8.6; 5 TABLET ORAL at 09:31

## 2019-03-18 RX ADMIN — OXYCODONE HYDROCHLORIDE 10 MG: 5 TABLET ORAL at 06:31

## 2019-03-18 RX ADMIN — ACETAMINOPHEN 650 MG: 325 TABLET, FILM COATED ORAL at 09:31

## 2019-03-18 RX ADMIN — OXYCODONE HYDROCHLORIDE 10 MG: 5 TABLET ORAL at 09:31

## 2019-03-18 RX ADMIN — SIMETHICONE CHEW TAB 80 MG 80 MG: 80 TABLET ORAL at 03:09

## 2019-03-18 RX ADMIN — OXYCODONE HYDROCHLORIDE 10 MG: 5 TABLET ORAL at 03:09

## 2019-03-18 NOTE — DISCHARGE INSTRUCTIONS
Postop  Birth Instructions    Activity       Do not lift more than 10 pounds for 6 weeks after surgery.  Ask family and friends for help when you need it.    No driving until you have stopped taking your pain medications (usually two weeks after surgery).    No heavy exercise or activity for 6 weeks.  Don't do anything that will put a strain on your surgery site.    Don't strain when using the toilet.  Your care team may prescribe a stool softener if you have problems with your bowel movements.     To care for your incision:       Keep the incision clean and dry.    Do not soak your incision in water. No swimming or hot tubs until it has fully healed. You may soak in the bathtub if the water level is below your incision.    Do not use peroxide, gel, cream, lotion, or ointment on your incision.    Adjust your clothes to avoid pressure on your surgery site (check the elastic in your underwear for example).     You may see a small amount of clear or pink drainage and this is normal.  Check with your health care provider:       If the drainage increases or has an odor.    If the incision reddens, you have swelling, or develop a rash.    If you have increased pain and the medicine we prescribed doesn't help.    If you have a fever above 100.4 F (38 C) with or without chills when placing thermometer under your tongue.   The area around your incision (surgery wound), will feel numb.  This is normal. The numbness should go away in less than a year.     Keep your hands clean:  Always wash your hands before touching your incision (surgery wound). This helps reduce your risk of infection. If your hands aren't dirty, you may use an alcohol hand-rub to clean your hands. Keep your nails clean and short.    Call your healthcare provider if you have any of these symptoms:       You soak a sanitary pad with blood within 1 hour, or you see blood clots larger than a golf ball.    Bleeding that lasts more than 6  weeks.    Vaginal discharge that smells bad.    Severe pain, cramping or tenderness in your lower belly area.    A need to urinate more frequently (use the toilet more often), more urgently (use the toilet very quickly), or it burns when you urinate.    Nausea and vomiting.    Redness, swelling or pain around a vein in your leg.    Problems breastfeeding or a red or painful area on your breast.    Chest pain and cough or are gasping for air.    Problems with coping with sadness, anxiety or depression. If you have concerns about hurting yourself or the baby, call your provider immediately.      You have questions or concerns after you return home.       Please call the office if you experience  - bleeding through more than a pad per hour persistently  - passage of blood clots greater than the size of velma  - abnormal vaginal discharge  - temperature greater than 100.4  - inability to tolerate food or fluid  - pain not controlled with oral medications  - persistent headache, vision changes, chest pain, shortness of breath, pain in the upper belly  - significant breast pain  - mood changes that affect your quality of life    Please make an appointment at [facility] in 6 weeks for a postpartum visit.

## 2019-03-18 NOTE — PLAN OF CARE
D: VSS, assessments WDL.   I: Pt. received complete discharge paperwork and home medications as filled by discharge pharmacy.  Pt. was given times of last dose for all discharge medications in writing on discharge medication sheets.  Discharge teaching included home medication, pain management, activity restrictions, postpartum cares, and signs and symptoms of infection.    A: Discharge outcomes on care plan met.  Mother states understanding and comfort with self and baby cares.  P: Pt. discharged to home.  Pt. was discharged with baby, and bands were checked at time of discharge.  Pt. was accompanied by , nurse and baby, and left with personal belongings.  Home care referral sent.  Pt. to follow up with OB per MD order.  Pt. had no further questions at the time of discharge and no unmet needs were identified.

## 2019-03-18 NOTE — PLAN OF CARE
Vital signs stable. /86. +2 dependent swelling.  Fundus firm, U/1, bleeding scant, no clots. Incision well approximated with steri strips. Using abdominal binder and Aqua K pack for discomfort. Taking Tylenol, ibuprofen, oxycodone for pain. Up in room independently, encouraged frequent voiding. Working on bottle feeding  and  cares. Will continue to monitor and notify MD as needed.

## 2019-03-18 NOTE — PROGRESS NOTES
"Maegan German  2019   POD4 s/p PLTCS    S: 38 year old  delivered at 36w5d   Doing well without complaints.  Sore but medication helping.   Lochia minimal.   Ambulating.  Urinating without issues.  Passing flatus. +BM.  Bottlefeeding.    O: /86   Pulse 81   Temp 98.4  F (36.9  C) (Oral)   Resp 16   Ht 1.499 m (4' 11\")   Wt 50.8 kg (112 lb)   SpO2 100%   Breastfeeding? Unknown   BMI 22.62 kg/m    Gen: NAD  Abd: soft, NT, ND, FF 3 below U  Incision: c/d/i with subQ suture and steristrips  Ext: NT, nonedematous    Hemoglobin   Date Value Ref Range Status   03/15/2019 9.6 (L) 11.7 - 15.7 g/dL Final   ]    A/P:  38 year old  POD4 s/p PLTCS due to elective purposes after PPROM at 36+5 wks  - ibuprofen,Tylenol, and oxycodone PRN pain  - bottlefeeding  - monitor lochia  - encourage ambulation  - regular diet  - incision appropriate  - routine PP care  - stable for discharge to home    Heide Ravi MD  Text Page (8am - 5pm)  "

## 2019-03-18 NOTE — PLAN OF CARE
VSS, fundus is firm at U/1, scant flow, no clots.  Incision is approximated, steri-strips intact, no drainage, wearing abdominal binder.  Pain controlled with Tylenol, Ibuprofen and Oxy 10mg.  Independent with cares.   is at bedside intermittently, but supportive when present.  Will continue to monitor and support.

## 2019-03-18 NOTE — DISCHARGE SUMMARY
[facility]    Discharge Summary  Obstetrics    Date of Admission:  3/14/2019  Date of Discharge:  3/18/2019  Discharging Provider: Heide Ravi    Discharge Diagnoses   CHTN  PPROM  PTL  S/p PLTCS    Procedure/Surgery Information   Procedure: Procedure(s):   SECTION   Surgeon(s): Surgeon(s) and Role:     * Heide Ravi MD - Primary     History of Present Illness   Maegan German is a 38 year old  who presented @ 36+5 with PPROM and PTL. Pregnancy complicated by h/o CHTN on amlodipine and hydrochlorothiazide. She presented late to care in her pregnancy at 15 weeks. She had a normal 20 week ultrasound. She was followed with serial growth scans. She expressed desire for elective PLTCS throughout her pregnancy due to anxiety regarding vaginal delivery due to report of family members with significant complications from vaginal delivery. She was counseled thoroughly on multiple occasions regarding her options and ultimately opted for PLTCS. She delivered a viable female infant weighing 6#2 oz with Apgars of 8/9. Please see operative report for full details.     Hospital Course   The patient's hospital course was unremarkable.  Blood pressures remained in normal to mild range postpartum. She recovered as anticipated and experienced no post-operative complications. On discharge, her pain was well controlled. Vaginal bleeding appropriate.  Voiding without difficulty.  Ambulating well and tolerating a normal diet.  No fever or significant wound drainage.  Breastfeeding well.  Infant stable.  She was discharged on post-partum day 4.    She was discharged with prescriptions for oxycodone 5 mg #20, ibuprofen 600 mg #30, acetaminophen 325 mg #100, and ferrous sulfate 325 mg BID #60.    Post-partum hemoglobin:   Hemoglobin   Date Value Ref Range Status   03/15/2019 9.6 (L) 11.7 - 15.7 g/dL Final       Heide Ravi MD    Discharge Disposition   Discharged to home    Condition at  discharge: Stable      Unresulted Labs Ordered in the Past 30 Days of this Admission     Date and Time Order Name Status Description    3/14/2019 1300 Opiates quantitative urine In process           Primary Care Physician   Heide Ravi    Consultations This Hospital Stay   HOME CARE POST PARTUM/ IP CONSULT  LACTATION IP CONSULT    Discharge Orders   No discharge procedures on file.  Discharge Medications   Current Discharge Medication List      START taking these medications    Details   acetaminophen (TYLENOL) 325 MG tablet Take 2 tablets (650 mg) by mouth every 4 hours as needed for other (multimodal surgical pain management along with NSAIDS and opioid medication as indicated based on pain control and physical function.)  Qty: 100 tablet, Refills: 0    Associated Diagnoses: S/P  section      ibuprofen (ADVIL/MOTRIN) 800 MG tablet Take 1 tablet (800 mg) by mouth every 6 hours as needed for other (cramping)  Qty: 30 tablet, Refills: 0    Associated Diagnoses: S/P  section      oxyCODONE (ROXICODONE) 5 MG tablet Take 1-2 tablets (5-10 mg) by mouth every 3 hours as needed for severe pain  Qty: 20 tablet, Refills: 0    Associated Diagnoses: S/P  section         CONTINUE these medications which have NOT CHANGED    Details   amLODIPine (NORVASC) 5 MG tablet Take 5 mg by mouth daily      hydrochlorothiazide (HYDRODIURIL) 25 MG tablet Take 50 mg by mouth every 24 hours       IRON PO       Prenatal Vit-Fe Fumarate-FA (PRENATAL VITAMIN PO) Take 1 tablet by mouth daily         STOP taking these medications       FOLIC ACID PO Comments:   Reason for Stopping:         magnesium 250 MG tablet Comments:   Reason for Stopping:             Allergies   Allergies   Allergen Reactions     Penicillins Hives     PN:  HIVES

## 2019-03-21 LAB
6MAM SERPL-MCNC: NEGATIVE NG/ML
AMPHETAMINES UR QL SCN: NEGATIVE
CANNABINOIDS UR QL: NEGATIVE
COCAINE UR QL: NEGATIVE
CODEINE UR CFM-MCNC: NEGATIVE NG/ML
MORPHINE UR CFM-MCNC: 171 NG/ML
OPIATES UR QL SCN: ABNORMAL
PCP UR QL SCN: NEGATIVE

## 2019-05-03 ENCOUNTER — HEALTH MAINTENANCE LETTER (OUTPATIENT)
Age: 39
End: 2019-05-03

## 2019-10-02 ENCOUNTER — HEALTH MAINTENANCE LETTER (OUTPATIENT)
Age: 39
End: 2019-10-02

## 2021-01-15 ENCOUNTER — HEALTH MAINTENANCE LETTER (OUTPATIENT)
Age: 41
End: 2021-01-15

## 2021-04-07 ENCOUNTER — TRANSFERRED RECORDS (OUTPATIENT)
Dept: HEALTH INFORMATION MANAGEMENT | Facility: CLINIC | Age: 41
End: 2021-04-07

## 2021-04-07 ENCOUNTER — MEDICAL CORRESPONDENCE (OUTPATIENT)
Dept: HEALTH INFORMATION MANAGEMENT | Facility: CLINIC | Age: 41
End: 2021-04-07

## 2021-04-07 LAB
ALT SERPL-CCNC: 25 IU/L (ref 12–68)
AST SERPL-CCNC: 13 IU/L (ref 12–37)
C TRACH DNA SPEC QL PROBE+SIG AMP: NEGATIVE
CREATININE (EXTERNAL): 0.55 MG/DL (ref 0.55–1.02)
GFR ESTIMATED (EXTERNAL): >60 ML/MIN
GFR ESTIMATED (IF AFRICAN AMERICAN) (EXTERNAL): >60 ML/MIN
HEP C HIM: NORMAL
HIV 1&2 EXT: NORMAL
N GONORRHOEA DNA SPEC QL PROBE+SIG AMP: NEGATIVE
SPECIMEN DESCRIP: NORMAL
SPECIMEN DESCRIPTION: NORMAL

## 2021-04-14 ENCOUNTER — TRANSCRIBE ORDERS (OUTPATIENT)
Dept: MATERNAL FETAL MEDICINE | Facility: CLINIC | Age: 41
End: 2021-04-14

## 2021-04-14 DIAGNOSIS — O26.90 PREGNANCY RELATED CONDITION, ANTEPARTUM: Primary | ICD-10-CM

## 2021-05-06 ENCOUNTER — PRE VISIT (OUTPATIENT)
Dept: MATERNAL FETAL MEDICINE | Facility: CLINIC | Age: 41
End: 2021-05-06

## 2021-05-13 ENCOUNTER — HOSPITAL ENCOUNTER (OUTPATIENT)
Dept: ULTRASOUND IMAGING | Facility: CLINIC | Age: 41
End: 2021-05-13
Attending: OBSTETRICS & GYNECOLOGY
Payer: COMMERCIAL

## 2021-05-13 ENCOUNTER — OFFICE VISIT (OUTPATIENT)
Dept: MATERNAL FETAL MEDICINE | Facility: CLINIC | Age: 41
End: 2021-05-13
Attending: OBSTETRICS & GYNECOLOGY
Payer: COMMERCIAL

## 2021-05-13 DIAGNOSIS — O26.90 PREGNANCY RELATED CONDITION, ANTEPARTUM: ICD-10-CM

## 2021-05-13 DIAGNOSIS — O09.522 MULTIGRAVIDA OF ADVANCED MATERNAL AGE IN SECOND TRIMESTER: ICD-10-CM

## 2021-05-13 PROCEDURE — 76811 OB US DETAILED SNGL FETUS: CPT | Mod: 26 | Performed by: OBSTETRICS & GYNECOLOGY

## 2021-05-13 PROCEDURE — 76817 TRANSVAGINAL US OBSTETRIC: CPT

## 2021-05-13 PROCEDURE — 76817 TRANSVAGINAL US OBSTETRIC: CPT | Mod: 26 | Performed by: OBSTETRICS & GYNECOLOGY

## 2021-05-13 NOTE — PROGRESS NOTES
"Please see \"Imaging\" tab under \"Chart Review\" for details of today's US.    Sri Valverde, DO    "

## 2021-05-26 ENCOUNTER — TRANSFERRED RECORDS (OUTPATIENT)
Dept: HEALTH INFORMATION MANAGEMENT | Facility: CLINIC | Age: 41
End: 2021-05-26

## 2021-06-10 ENCOUNTER — HOSPITAL ENCOUNTER (OUTPATIENT)
Dept: ULTRASOUND IMAGING | Facility: CLINIC | Age: 41
End: 2021-06-10
Attending: OBSTETRICS & GYNECOLOGY
Payer: COMMERCIAL

## 2021-06-10 ENCOUNTER — OFFICE VISIT (OUTPATIENT)
Dept: MATERNAL FETAL MEDICINE | Facility: CLINIC | Age: 41
End: 2021-06-10
Attending: OBSTETRICS & GYNECOLOGY
Payer: COMMERCIAL

## 2021-06-10 DIAGNOSIS — O36.5990 PREGNANCY AFFECTED BY FETAL GROWTH RESTRICTION: Primary | ICD-10-CM

## 2021-06-10 PROCEDURE — 76820 UMBILICAL ARTERY ECHO: CPT | Mod: 26 | Performed by: OBSTETRICS & GYNECOLOGY

## 2021-06-10 PROCEDURE — 76816 OB US FOLLOW-UP PER FETUS: CPT

## 2021-06-10 PROCEDURE — 76816 OB US FOLLOW-UP PER FETUS: CPT | Mod: 26 | Performed by: OBSTETRICS & GYNECOLOGY

## 2021-06-10 NOTE — PROGRESS NOTES
Please see the imaging tab for details of the ultrasound performed today.    Gerda Quiles MD  Specialist in Maternal-Fetal Medicine

## 2021-06-16 ENCOUNTER — TRANSFERRED RECORDS (OUTPATIENT)
Dept: HEALTH INFORMATION MANAGEMENT | Facility: CLINIC | Age: 41
End: 2021-06-16

## 2021-06-17 ENCOUNTER — MEDICAL CORRESPONDENCE (OUTPATIENT)
Dept: HEALTH INFORMATION MANAGEMENT | Facility: CLINIC | Age: 41
End: 2021-06-17

## 2021-06-17 ENCOUNTER — TRANSCRIBE ORDERS (OUTPATIENT)
Dept: MATERNAL FETAL MEDICINE | Facility: CLINIC | Age: 41
End: 2021-06-17

## 2021-06-17 ENCOUNTER — DOCUMENTATION ONLY (OUTPATIENT)
Dept: MATERNAL FETAL MEDICINE | Facility: CLINIC | Age: 41
End: 2021-06-17

## 2021-06-17 DIAGNOSIS — O26.90 PREGNANCY RELATED CONDITION: Primary | ICD-10-CM

## 2021-06-17 NOTE — PROGRESS NOTES
Maegan called [facility] and spoke with a . She  Requested her f/u appointment be done with MFM. MARTIN Gambino at [facility] was called to confirm POC. She states patient should be seen with MFM for the f/u US as they don't have availability to get her in their office. Maki will send referral. maki states that this should be for a one time only US and they will be able to do the f/u.    Vanessa España RN

## 2021-06-19 DIAGNOSIS — Z11.59 ENCOUNTER FOR SCREENING FOR OTHER VIRAL DISEASES: ICD-10-CM

## 2021-07-05 ENCOUNTER — OFFICE VISIT (OUTPATIENT)
Dept: MATERNAL FETAL MEDICINE | Facility: CLINIC | Age: 41
End: 2021-07-05
Attending: OBSTETRICS & GYNECOLOGY
Payer: COMMERCIAL

## 2021-07-05 ENCOUNTER — HOSPITAL ENCOUNTER (OUTPATIENT)
Dept: ULTRASOUND IMAGING | Facility: CLINIC | Age: 41
End: 2021-07-05
Attending: OBSTETRICS & GYNECOLOGY
Payer: COMMERCIAL

## 2021-07-05 DIAGNOSIS — O36.5990 PREGNANCY AFFECTED BY FETAL GROWTH RESTRICTION: Primary | ICD-10-CM

## 2021-07-05 DIAGNOSIS — O26.90 PREGNANCY RELATED CONDITION: ICD-10-CM

## 2021-07-05 PROCEDURE — 76816 OB US FOLLOW-UP PER FETUS: CPT

## 2021-07-05 PROCEDURE — 59025 FETAL NON-STRESS TEST: CPT | Mod: 26 | Performed by: OBSTETRICS & GYNECOLOGY

## 2021-07-05 PROCEDURE — 76816 OB US FOLLOW-UP PER FETUS: CPT | Mod: 26 | Performed by: OBSTETRICS & GYNECOLOGY

## 2021-07-05 PROCEDURE — 76820 UMBILICAL ARTERY ECHO: CPT | Mod: 26 | Performed by: OBSTETRICS & GYNECOLOGY

## 2021-07-05 PROCEDURE — 59025 FETAL NON-STRESS TEST: CPT

## 2021-07-05 NOTE — PROGRESS NOTES
Please see full imaging report from [employer] program under imaging tab.    Mo Green MD  Maternal Fetal Medicine

## 2021-07-15 DIAGNOSIS — O36.5990 PREGNANCY AFFECTED BY FETAL GROWTH RESTRICTION: Primary | ICD-10-CM

## 2021-07-22 ENCOUNTER — HOSPITAL ENCOUNTER (OUTPATIENT)
Dept: ULTRASOUND IMAGING | Facility: CLINIC | Age: 41
End: 2021-07-22
Attending: OBSTETRICS & GYNECOLOGY
Payer: COMMERCIAL

## 2021-07-22 ENCOUNTER — OFFICE VISIT (OUTPATIENT)
Dept: MATERNAL FETAL MEDICINE | Facility: CLINIC | Age: 41
End: 2021-07-22
Attending: OBSTETRICS & GYNECOLOGY
Payer: COMMERCIAL

## 2021-07-22 VITALS — HEART RATE: 86 BPM | DIASTOLIC BLOOD PRESSURE: 75 MMHG | SYSTOLIC BLOOD PRESSURE: 124 MMHG

## 2021-07-22 DIAGNOSIS — O36.5990 PREGNANCY AFFECTED BY FETAL GROWTH RESTRICTION: ICD-10-CM

## 2021-07-22 DIAGNOSIS — O10.019 PRE-EXISTING ESSENTIAL HYPERTENSION DURING PREGNANCY, ANTEPARTUM: ICD-10-CM

## 2021-07-22 DIAGNOSIS — O36.5990 PREGNANCY AFFECTED BY FETAL GROWTH RESTRICTION: Primary | ICD-10-CM

## 2021-07-22 PROCEDURE — 76820 UMBILICAL ARTERY ECHO: CPT | Mod: 26 | Performed by: OBSTETRICS & GYNECOLOGY

## 2021-07-22 PROCEDURE — 76818 FETAL BIOPHYS PROFILE W/NST: CPT | Mod: 26 | Performed by: OBSTETRICS & GYNECOLOGY

## 2021-07-22 PROCEDURE — 76816 OB US FOLLOW-UP PER FETUS: CPT

## 2021-07-22 PROCEDURE — 76816 OB US FOLLOW-UP PER FETUS: CPT | Mod: 26 | Performed by: OBSTETRICS & GYNECOLOGY

## 2021-07-22 PROCEDURE — 76818 FETAL BIOPHYS PROFILE W/NST: CPT

## 2021-07-22 NOTE — PROGRESS NOTES
Maegan Monserrat was seen for an ultrasound today at the Maternal-Fetal Medicine center.      For the details of the ultrasound please see the report which can be found under the imaging tab.      Neeta Pendleton MD  , OB/GYN  Maternal-Fetal Medicine  [email or web address]  [phone] (Main M Office)  [id number] or [phone] (for 24 hour MFM questions)  [phone] (Pager)

## 2021-07-22 NOTE — NURSING NOTE
NST Performed due to FGR.   reviewed efm tracing. See NST/BPP Doc Flowsheet tab. BPP done due to x1 accel in first 20 minutes for NST. Accel was noted at end of monitoring.     MARTIN Fournier at [facility] was called with new recommendation for 2x/wk NST/UAR (previously was 1x/wk). Dr Ravi made aware of recommendation and would like all future NST/UAR and growth US's with M.    Vanessa España RN

## 2021-07-26 ENCOUNTER — OFFICE VISIT (OUTPATIENT)
Dept: MATERNAL FETAL MEDICINE | Facility: CLINIC | Age: 41
End: 2021-07-26
Attending: OBSTETRICS & GYNECOLOGY
Payer: COMMERCIAL

## 2021-07-26 ENCOUNTER — HOSPITAL ENCOUNTER (OUTPATIENT)
Dept: ULTRASOUND IMAGING | Facility: CLINIC | Age: 41
End: 2021-07-26
Attending: OBSTETRICS & GYNECOLOGY
Payer: COMMERCIAL

## 2021-07-26 DIAGNOSIS — O36.5990 PREGNANCY AFFECTED BY FETAL GROWTH RESTRICTION: ICD-10-CM

## 2021-07-26 PROCEDURE — 76820 UMBILICAL ARTERY ECHO: CPT

## 2021-07-26 PROCEDURE — 59025 FETAL NON-STRESS TEST: CPT

## 2021-07-26 PROCEDURE — 59025 FETAL NON-STRESS TEST: CPT | Mod: 26 | Performed by: OBSTETRICS & GYNECOLOGY

## 2021-07-26 PROCEDURE — 76815 OB US LIMITED FETUS(S): CPT | Mod: 26 | Performed by: OBSTETRICS & GYNECOLOGY

## 2021-07-26 PROCEDURE — 76820 UMBILICAL ARTERY ECHO: CPT | Mod: 26 | Performed by: OBSTETRICS & GYNECOLOGY

## 2021-07-26 NOTE — PROGRESS NOTES
Maegan Monserrat was seen for an ultrasound today at the Maternal-Fetal Medicine center.      For the details of the ultrasound please see the report which can be found under the imaging tab.      Neeta Pendleton MD  , OB/GYN  Maternal-Fetal Medicine  [email or web address]  [phone] (Main M Office)  [phone] or [phone] (for 24 hour MFM questions)  [phone] (Pager)

## 2021-07-28 DIAGNOSIS — O36.5990 PREGNANCY AFFECTED BY FETAL GROWTH RESTRICTION: Primary | ICD-10-CM

## 2021-07-28 NOTE — PROGRESS NOTES
Dior from [facility] called and states patient is in their office and needs appointment scheduled with [facility] tomorrow. We discussed that I reached out to patient to schedule appointment but haven't heard back from patient. NST/limited/UAR scheduled for tomorrow at 1500 at [facility]. We discussed that patient is hoping to do her NST's/UAR's with their office on Monday as she is scheduled with them already for her gct. Dior will review POC with patient and call us if patient needs MFM appointment on Monday.    Vanessa España, RN

## 2021-07-29 ENCOUNTER — OFFICE VISIT (OUTPATIENT)
Dept: MATERNAL FETAL MEDICINE | Facility: CLINIC | Age: 41
End: 2021-07-29
Attending: OBSTETRICS & GYNECOLOGY
Payer: COMMERCIAL

## 2021-07-29 ENCOUNTER — HOSPITAL ENCOUNTER (OUTPATIENT)
Dept: ULTRASOUND IMAGING | Facility: CLINIC | Age: 41
End: 2021-07-29
Attending: OBSTETRICS & GYNECOLOGY
Payer: COMMERCIAL

## 2021-07-29 DIAGNOSIS — O36.5990 PREGNANCY AFFECTED BY FETAL GROWTH RESTRICTION: Primary | ICD-10-CM

## 2021-07-29 DIAGNOSIS — O36.5990 PREGNANCY AFFECTED BY FETAL GROWTH RESTRICTION: ICD-10-CM

## 2021-07-29 PROCEDURE — 76820 UMBILICAL ARTERY ECHO: CPT | Mod: 26 | Performed by: OBSTETRICS & GYNECOLOGY

## 2021-07-29 PROCEDURE — 76820 UMBILICAL ARTERY ECHO: CPT

## 2021-07-29 PROCEDURE — 59025 FETAL NON-STRESS TEST: CPT | Mod: 26 | Performed by: OBSTETRICS & GYNECOLOGY

## 2021-07-29 PROCEDURE — 59025 FETAL NON-STRESS TEST: CPT

## 2021-07-29 PROCEDURE — 76815 OB US LIMITED FETUS(S): CPT | Mod: 26 | Performed by: OBSTETRICS & GYNECOLOGY

## 2021-07-29 NOTE — PROGRESS NOTES
"Please see \"imaging\" tab under \"Chart Review\" for details of today's US at the [facility].    Aaron Mclain MD  Maternal-Fetal Medicine    "

## 2021-08-02 ENCOUNTER — HOSPITAL ENCOUNTER (OUTPATIENT)
Dept: ULTRASOUND IMAGING | Facility: CLINIC | Age: 41
End: 2021-08-02
Attending: OBSTETRICS & GYNECOLOGY
Payer: COMMERCIAL

## 2021-08-02 ENCOUNTER — OFFICE VISIT (OUTPATIENT)
Dept: MATERNAL FETAL MEDICINE | Facility: CLINIC | Age: 41
End: 2021-08-02
Attending: OBSTETRICS & GYNECOLOGY
Payer: COMMERCIAL

## 2021-08-02 DIAGNOSIS — O36.5990 PREGNANCY AFFECTED BY FETAL GROWTH RESTRICTION: Primary | ICD-10-CM

## 2021-08-02 DIAGNOSIS — O36.5990 PREGNANCY AFFECTED BY FETAL GROWTH RESTRICTION: ICD-10-CM

## 2021-08-02 PROCEDURE — 76815 OB US LIMITED FETUS(S): CPT | Mod: 26 | Performed by: OBSTETRICS & GYNECOLOGY

## 2021-08-02 PROCEDURE — 76815 OB US LIMITED FETUS(S): CPT

## 2021-08-02 PROCEDURE — 59025 FETAL NON-STRESS TEST: CPT | Mod: 26 | Performed by: OBSTETRICS & GYNECOLOGY

## 2021-08-02 PROCEDURE — 76820 UMBILICAL ARTERY ECHO: CPT | Mod: 26 | Performed by: OBSTETRICS & GYNECOLOGY

## 2021-08-02 PROCEDURE — 59025 FETAL NON-STRESS TEST: CPT

## 2021-08-02 NOTE — NURSING NOTE
NST performed due to FGR.  Dr. Green reviewed efm tracing. See NST/BPP Doc Flowsheet tab.  Refer to MD notes in MFM ultrasound report.  Will continue 2x/week monitoring.

## 2021-08-05 ENCOUNTER — OFFICE VISIT (OUTPATIENT)
Dept: MATERNAL FETAL MEDICINE | Facility: CLINIC | Age: 41
End: 2021-08-05
Attending: OBSTETRICS & GYNECOLOGY
Payer: COMMERCIAL

## 2021-08-05 ENCOUNTER — HOSPITAL ENCOUNTER (INPATIENT)
Facility: CLINIC | Age: 41
LOS: 1 days | Discharge: HOME | End: 2021-08-06
Attending: OBSTETRICS & GYNECOLOGY | Admitting: OBSTETRICS & GYNECOLOGY
Payer: COMMERCIAL

## 2021-08-05 ENCOUNTER — HOSPITAL ENCOUNTER (OUTPATIENT)
Dept: ULTRASOUND IMAGING | Facility: CLINIC | Age: 41
End: 2021-08-05
Attending: OBSTETRICS & GYNECOLOGY
Payer: COMMERCIAL

## 2021-08-05 DIAGNOSIS — O36.5990 PREGNANCY AFFECTED BY FETAL GROWTH RESTRICTION: ICD-10-CM

## 2021-08-05 DIAGNOSIS — O36.5990 PREGNANCY AFFECTED BY FETAL GROWTH RESTRICTION: Primary | ICD-10-CM

## 2021-08-05 DIAGNOSIS — O10.019 PRE-EXISTING ESSENTIAL HYPERTENSION DURING PREGNANCY, ANTEPARTUM: ICD-10-CM

## 2021-08-05 LAB
ABO/RH(D): NORMAL
ANTIBODY SCREEN: NEGATIVE
BASOPHILS # BLD AUTO: 0 10E3/UL (ref 0–0.2)
BASOPHILS NFR BLD AUTO: 0 %
EOSINOPHIL # BLD AUTO: 0.2 10E3/UL (ref 0–0.7)
EOSINOPHIL NFR BLD AUTO: 3 %
ERYTHROCYTE [DISTWIDTH] IN BLOOD BY AUTOMATED COUNT: 13.2 % (ref 10–15)
HCT VFR BLD AUTO: 32.4 % (ref 35–47)
HGB BLD-MCNC: 10.9 G/DL (ref 11.7–15.7)
IMM GRANULOCYTES # BLD: 0.1 10E3/UL
IMM GRANULOCYTES NFR BLD: 1 %
LYMPHOCYTES # BLD AUTO: 1.9 10E3/UL (ref 0.8–5.3)
LYMPHOCYTES NFR BLD AUTO: 25 %
MCH RBC QN AUTO: 27.9 PG (ref 26.5–33)
MCHC RBC AUTO-ENTMCNC: 33.6 G/DL (ref 31.5–36.5)
MCV RBC AUTO: 83 FL (ref 78–100)
MONOCYTES # BLD AUTO: 0.8 10E3/UL (ref 0–1.3)
MONOCYTES NFR BLD AUTO: 11 %
NEUTROPHILS # BLD AUTO: 4.7 10E3/UL (ref 1.6–8.3)
NEUTROPHILS NFR BLD AUTO: 60 %
NRBC # BLD AUTO: 0 10E3/UL
NRBC BLD AUTO-RTO: 0 /100
PLATELET # BLD AUTO: 220 10E3/UL (ref 150–450)
RBC # BLD AUTO: 3.91 10E6/UL (ref 3.8–5.2)
SARS-COV-2 RNA RESP QL NAA+PROBE: NEGATIVE
SPECIMEN EXPIRATION DATE: NORMAL
WBC # BLD AUTO: 7.7 10E3/UL (ref 4–11)

## 2021-08-05 PROCEDURE — 36415 COLL VENOUS BLD VENIPUNCTURE: CPT | Performed by: OBSTETRICS & GYNECOLOGY

## 2021-08-05 PROCEDURE — 76815 OB US LIMITED FETUS(S): CPT | Mod: 26 | Performed by: OBSTETRICS & GYNECOLOGY

## 2021-08-05 PROCEDURE — 76818 FETAL BIOPHYS PROFILE W/NST: CPT

## 2021-08-05 PROCEDURE — 87635 SARS-COV-2 COVID-19 AMP PRB: CPT | Performed by: OBSTETRICS & GYNECOLOGY

## 2021-08-05 PROCEDURE — 250N000011 HC RX IP 250 OP 636: Performed by: OBSTETRICS & GYNECOLOGY

## 2021-08-05 PROCEDURE — 99221 1ST HOSP IP/OBS SF/LOW 40: CPT | Performed by: NURSE PRACTITIONER

## 2021-08-05 PROCEDURE — 85025 COMPLETE CBC W/AUTO DIFF WBC: CPT | Performed by: OBSTETRICS & GYNECOLOGY

## 2021-08-05 PROCEDURE — 76820 UMBILICAL ARTERY ECHO: CPT | Mod: 26 | Performed by: OBSTETRICS & GYNECOLOGY

## 2021-08-05 PROCEDURE — 76818 FETAL BIOPHYS PROFILE W/NST: CPT | Mod: 26 | Performed by: OBSTETRICS & GYNECOLOGY

## 2021-08-05 PROCEDURE — 250N000013 HC RX MED GY IP 250 OP 250 PS 637: Performed by: OBSTETRICS & GYNECOLOGY

## 2021-08-05 PROCEDURE — 120N000012 HC R&B POSTPARTUM

## 2021-08-05 PROCEDURE — 86901 BLOOD TYPING SEROLOGIC RH(D): CPT | Performed by: OBSTETRICS & GYNECOLOGY

## 2021-08-05 RX ORDER — ASPIRIN 81 MG/1
81 TABLET, CHEWABLE ORAL AT BEDTIME
Status: ON HOLD | COMMUNITY
End: 2021-09-30

## 2021-08-05 RX ORDER — BETAMETHASONE SODIUM PHOSPHATE AND BETAMETHASONE ACETATE 3; 3 MG/ML; MG/ML
12 INJECTION, SUSPENSION INTRA-ARTICULAR; INTRALESIONAL; INTRAMUSCULAR; SOFT TISSUE EVERY 24 HOURS
Status: COMPLETED | OUTPATIENT
Start: 2021-08-05 | End: 2021-08-06

## 2021-08-05 RX ORDER — AMLODIPINE BESYLATE 5 MG/1
5 TABLET ORAL DAILY
Status: DISCONTINUED | OUTPATIENT
Start: 2021-08-05 | End: 2021-08-06 | Stop reason: HOSPADM

## 2021-08-05 RX ORDER — ACETAMINOPHEN 325 MG/1
650 TABLET ORAL EVERY 4 HOURS PRN
Status: DISCONTINUED | OUTPATIENT
Start: 2021-08-05 | End: 2021-08-06 | Stop reason: HOSPADM

## 2021-08-05 RX ORDER — ZOLPIDEM TARTRATE 5 MG/1
5 TABLET ORAL
Status: DISCONTINUED | OUTPATIENT
Start: 2021-08-05 | End: 2021-08-06 | Stop reason: HOSPADM

## 2021-08-05 RX ORDER — LABETALOL 100 MG/1
100 TABLET, FILM COATED ORAL 2 TIMES DAILY
Status: ON HOLD | COMMUNITY
End: 2021-08-05

## 2021-08-05 RX ORDER — PRENATAL VIT/IRON FUM/FOLIC AC 27MG-0.8MG
1 TABLET ORAL DAILY
Status: DISCONTINUED | OUTPATIENT
Start: 2021-08-05 | End: 2021-08-06 | Stop reason: HOSPADM

## 2021-08-05 RX ORDER — ASPIRIN 81 MG/1
81 TABLET, CHEWABLE ORAL AT BEDTIME
Status: DISCONTINUED | OUTPATIENT
Start: 2021-08-05 | End: 2021-08-06 | Stop reason: HOSPADM

## 2021-08-05 RX ORDER — LABETALOL 100 MG/1
100 TABLET, FILM COATED ORAL EVERY 12 HOURS SCHEDULED
Status: DISCONTINUED | OUTPATIENT
Start: 2021-08-05 | End: 2021-08-06 | Stop reason: HOSPADM

## 2021-08-05 RX ADMIN — LABETALOL HYDROCHLORIDE 100 MG: 100 TABLET, FILM COATED ORAL at 22:49

## 2021-08-05 RX ADMIN — BETAMETHASONE SODIUM PHOSPHATE AND BETAMETHASONE ACETATE 12 MG: 3; 3 INJECTION, SUSPENSION INTRA-ARTICULAR; INTRALESIONAL; INTRAMUSCULAR at 14:52

## 2021-08-05 RX ADMIN — ASPIRIN 81 MG CHEWABLE TABLET 81 MG: 81 TABLET CHEWABLE at 22:49

## 2021-08-05 RX ADMIN — ZOLPIDEM TARTRATE 5 MG: 5 TABLET ORAL at 22:49

## 2021-08-05 RX ADMIN — AMLODIPINE BESYLATE 5 MG: 5 TABLET ORAL at 22:49

## 2021-08-05 RX ADMIN — ACETAMINOPHEN 650 MG: 325 TABLET, FILM COATED ORAL at 23:48

## 2021-08-05 ASSESSMENT — ACTIVITIES OF DAILY LIVING (ADL)
WALKING_OR_CLIMBING_STAIRS_DIFFICULTY: NO
TOILETING_ISSUES: NO
WEAR_GLASSES_OR_BLIND: NO
FALL_HISTORY_WITHIN_LAST_SIX_MONTHS: NO
DIFFICULTY_COMMUNICATING: NO
HEARING_DIFFICULTY_OR_DEAF: NO
CONCENTRATING,_REMEMBERING_OR_MAKING_DECISIONS_DIFFICULTY: NO
DIFFICULTY_EATING/SWALLOWING: NO
PATIENT_/_FAMILY_COMMUNICATION_STYLE: SPOKEN LANGUAGE (ENGLISH OR BILINGUAL)
DRESSING/BATHING_DIFFICULTY: NO

## 2021-08-05 ASSESSMENT — MIFFLIN-ST. JEOR: SCORE: 1065.98

## 2021-08-05 NOTE — PROGRESS NOTES
Maegan Monserrat was seen for an ultrasound today at the Maternal-Fetal Medicine center.      For the details of the ultrasound please see the report which can be found under the imaging tab.      Neeta Pendleton MD  , OB/GYN  Maternal-Fetal Medicine  [email or web address]  [phone] (Main M Office)  [phone] or [phone] (for 24 hour MFM questions)  [phone] (Pager)
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
## 2021-08-05 NOTE — PLAN OF CARE
PT arrived from MD's office for prolonged monitoring and Beta. Plan of care discussed with patient who verbalizes understanding/agreement with plan. PT placed on EFM. VSS taken and prenatal record reviewed. Health history obtained from patient.

## 2021-08-05 NOTE — NURSING NOTE
NST Performed due to FGR.   reviewed efm tracing. See NST/BPP Doc Flowsheet tab. NST non reactive. BPP 8/8. Due to non reactive NST, Dr Pendleton recommended prolonged monitoring. LD closed. MARTIN Belcher at [facility] called and will call patient to make follow-up plan with their office.    Vanessa España RN

## 2021-08-06 ENCOUNTER — APPOINTMENT (OUTPATIENT)
Dept: ULTRASOUND IMAGING | Facility: CLINIC | Age: 41
End: 2021-08-06
Attending: OBSTETRICS & GYNECOLOGY
Payer: COMMERCIAL

## 2021-08-06 VITALS
RESPIRATION RATE: 16 BRPM | WEIGHT: 107 LBS | HEIGHT: 59 IN | SYSTOLIC BLOOD PRESSURE: 120 MMHG | TEMPERATURE: 98 F | DIASTOLIC BLOOD PRESSURE: 73 MMHG | BODY MASS INDEX: 21.57 KG/M2 | HEART RATE: 88 BPM

## 2021-08-06 PROCEDURE — 250N000013 HC RX MED GY IP 250 OP 250 PS 637: Performed by: OBSTETRICS & GYNECOLOGY

## 2021-08-06 PROCEDURE — 250N000011 HC RX IP 250 OP 636: Performed by: OBSTETRICS & GYNECOLOGY

## 2021-08-06 PROCEDURE — 76819 FETAL BIOPHYS PROFIL W/O NST: CPT

## 2021-08-06 RX ADMIN — BETAMETHASONE SODIUM PHOSPHATE AND BETAMETHASONE ACETATE 12 MG: 3; 3 INJECTION, SUSPENSION INTRA-ARTICULAR; INTRALESIONAL; INTRAMUSCULAR at 14:19

## 2021-08-06 RX ADMIN — ACETAMINOPHEN 650 MG: 325 TABLET, FILM COATED ORAL at 14:29

## 2021-08-06 RX ADMIN — LABETALOL HYDROCHLORIDE 100 MG: 100 TABLET, FILM COATED ORAL at 08:37

## 2021-08-06 RX ADMIN — PRENATAL VITAMINS-IRON FUMARATE 27 MG IRON-FOLIC ACID 0.8 MG TABLET 1 TABLET: at 08:37

## 2021-08-06 RX ADMIN — ACETAMINOPHEN 650 MG: 325 TABLET, FILM COATED ORAL at 06:11

## 2021-08-06 NOTE — DISCHARGE SUMMARY
[facility] Discharge Summary    Maegan German MRN# [id number]   Age: 41 year old YOB: 1980     Date of Admission:  2021  Date of Discharge::  2021   Admitting Physician:  2021  Discharge Physician:  Tao Zamorano MD            Admission Diagnoses:   Maternal care due to low transverse uterine scar from previous  delivery [O34.211]  Gestational hypertension, antepartum [O13.9]  Pregnancy affected by fetal growth restriction [O36.5990]          Discharge Diagnosis:   Maternal care due to low transverse uterine scar from previous  delivery [O34.211]  Gestational hypertension, antepartum [O13.9]  Pregnancy affected by fetal growth restriction [O36.5990]          Procedures:   Procedure(s):  none       Imaging: BPP   testing: NST           Medications Prior to Admission:     Medications Prior to Admission   Medication Sig Dispense Refill Last Dose     amLODIPine (NORVASC) 5 MG tablet Take 5 mg by mouth daily   2021 at Unknown time     aspirin (ASA) 81 MG chewable tablet Take 81 mg by mouth At Bedtime   2021 at Unknown time     Prenatal Vit-Fe Fumarate-FA (PRENATAL VITAMIN PO) Take 1 tablet by mouth daily   2021 at Unknown time             Discharge Medications:     Current Discharge Medication List      CONTINUE these medications which have NOT CHANGED    Details   amLODIPine (NORVASC) 5 MG tablet Take 5 mg by mouth daily      aspirin (ASA) 81 MG chewable tablet Take 81 mg by mouth At Bedtime      Prenatal Vit-Fe Fumarate-FA (PRENATAL VITAMIN PO) Take 1 tablet by mouth daily                   Consultations:   Consultation during this admission received from [facility]          Brief History of Illness:    at 30w6d admitted for monitoring due to decelerations seen on NST at [facility]             Hospital Course:   The patient's hospital course was unremarkable.  She had reactive NST with very occasional variable decelerations.  Monitoring  overall very stable. Bpp today 8/8.  S/p BMZ on 8/5 and 8/6.       Discharge Instructions and Follow-Up:   Discharge diet: Regular   Discharge activity: Activity as tolerated  No strenuous activity   Discharge follow-up: As outlined on discharge instructions   Wound care Drink plenty of fluids           Discharge Disposition:   Discharged to home      Attestation:  Amount of time performed on this discharge summary: 10 minutes.    Tao Zamorano MD

## 2021-08-06 NOTE — PROGRESS NOTES
"Maegan German  23    S: pt doing well. +FM    O:/73   Pulse 88   Temp 98  F (36.7  C) (Temporal)   Resp 16   Ht 1.499 m (4' 11\")   Wt 48.5 kg (107 lb)   LMP 2021   BMI 21.61 kg/m     FHT: 115, [place], reactive, no decelerations present  [place]: none    A/P: at 30w6d     1. FGR  - BPP  today  - NST reactive  - followed by MFM.  BPP and cord dopplers scheduled for  and .  Repeat growth on .   - s/p BMZ    2. Carpal tunnel  - has appointment with Janet on     3. Dispo  - plan home today  - continue routine antepartum care including 3 hr glucose testing.    Tao Zamorano MD     "

## 2021-08-06 NOTE — PROGRESS NOTES
BPP reviewed     US OB FETAL BIOPHY PROFILE W/O NON STRESS SINGLE 8/6/2021 8:34 AM     CLINICAL HISTORY: severe IUGR     COMPARISON: Ultrasound 8/5/2021     FINDINGS:  Single living fetus, cephalic presentation.  Heart rate of 149 beats per minute.  SDP 3.2 cm.     2/2 fetal breathing  2/2 fetal movements  2/2 fetal tone  2/2 amniotic fluid     Total biophysical profile 8/8                                                                      IMPRESSION:  1.  Normal 8/8 biophysical profile.     RICO WOODS MD     Plan  Continue care as outlined on note this morning  Steroids this afternoon and likely home if fetal monitoring remains reassuring.    Tao Zamorano MD

## 2021-08-06 NOTE — DISCHARGE INSTRUCTIONS
Discharge Instruction for Undelivered Patients      You were seen for: Fetal Assessment  We Consulted: Dr Ravi, Dr Zamorano, [facility]  You had (Test or Medicine):ultrasound, betamethasone     Diet:   Drink 8 to 12 glasses of liquids (milk, juice, water) every day.  You may eat meals and snacks.     Activity:  Call your doctor or nurse midwife if your baby is moving less than usual.     Call your provider if you notice:  Swelling in your face or increased swelling in your hands or legs.  Headaches that are not relieved by Tylenol (acetaminophen).  Changes in your vision (blurring: seeing spots or stars.)  Nausea (sick to your stomach) and vomiting (throwing up).   Weight gain of 5 pounds or more per week.  Heartburn that doesn't go away.  Signs of bladder infection: pain when you urinate (use the toilet), need to go more often and more urgently.  The bag of coppola (rupture of membranes) breaks, or you notice leaking in your underwear.  Bright red blood in your underwear.  Abdominal (lower belly) or stomach pain.  For first baby: Contractions (tightening) less than 5 minutes apart for one hour or more.  Second (plus) baby: Contractions (tightening) less than 10 minutes apart and getting stronger.  *If less than 34 weeks: Contractions (tightening) more than 6 times in one hour.  Increase or change in vaginal discharge (note the color and amount)  Other:     Follow-up:  As scheduled in the clinic

## 2021-08-06 NOTE — UTILIZATION REVIEW
"  Admission Status; Secondary Review Determination         Under the authority of the Utilization Management Committee, the utilization review process indicated a secondary review on the above patient.  The review outcome is based on review of the medical records, discussions with staff, and applying clinical experience noted on the date of the review.        (x)      Inpatient Status Appropriate - This patient's medical care is consistent with medical management for inpatient care and reasonable inpatient medical practice.      () Observation Status Appropriate - This patient does not meet hospital inpatient criteria and is placed in observation status. If this patient's primary payer is Medicare and was admitted as an inpatient, Condition Code 44 should be used and patient status changed to \"observation\".   () Admission Status NOT Appropriate - This patient's medical care is not consistent with medical management for Inpatient or Observation Status.          RATIONALE FOR DETERMINATION     Maegan German is a 41 year old  at 30w5d with chronic HTN(on dual therapy) and fetus with severe FGR who was admitted for extended fetal monitoring due to variable decelerations noted on biweekly monitoring for the high risk pregnancy.  At 22 weeks, the EFW was 8%ile. She has had subsequent growth scans with EFW at 3%ile at 26 weeks and <1%ile at 29 weeks (2#). She has been having twice weekly  testing with UA dopplers. She had testing with MFM today with BPP 8/10 due to NR NST, normal UA dopplers.  She had 3 variable decelerations noted within a 20 minute timeframe and was admitted for extended fetal monitoring and beta methasone x2.  I spoke with Dr. Ravi who anticipates she will require another night in the hospital for further monitoring. IP status appropriate.     The severity of illness, intensity of service provided, expected LOS and risk for adverse outcome make the care complex, high risk and " appropriate for hospital admission.        The information on this document is developed by the utilization review team in order for the business office to ensure compliance.  This only denotes the appropriateness of proper admission status and does not reflect the quality of care rendered.         The definitions of Inpatient Status and Observation Status used in making the determination above are those provided in the CMS Coverage Manual, Chapter 1 and Chapter 6, section 70.4.      Sincerely,     Tracey Brandon MD  Physician Advisor   Utilization Review/ Case Management  [facility].

## 2021-08-06 NOTE — PROGRESS NOTES
"Antepartum OB note  Maegan German  2021  Hospital Day #1    S: pt doing okay.  Had trouble sleeping last night due to carpal tunnel discomfort.  +FM.  Pt on her way down to radiology for BPP.      O:/63   Pulse 96   Temp 98.6  F (37  C) (Temporal)   Resp 16   Ht 1.499 m (4' 11\")   Wt 48.5 kg (107 lb)   LMP 2021   BMI 21.61 kg/m     Tracing from this morning reviewed  FHT : 120 mod marcela, reactive with 15x15 acclerations  [place]: rare contraction    Tracing from over night reviewed  FHR ranges from 115-125, always with moderate variablilty.  Reactive throughout the night.  Occasional variable deceleration (x2 overnight)    [place]: none    A/p:  at 30w6d admitted to antepartum for extended monitoring and BMZ due to variable decelerations on office monitoring in setting of CHTN and FGR    1. CHTN  - continue amlodipine and labetalol  - no indication for PEC labs at this time    2. FGR  - EFW 2# (<1%ile) at 29 weeks - next growth scan scheduled with MFM 21  - UA dopplers normal 2021  - BPP 8/10 2021 due to NR NST, BPP today.    - NST reactive over night and this morning, plan for tid NST.  - MFM consulted   - plan discharge after steroid window pending testing today.  Second dose due today at 1452.  Likely to discharge tomorrow.     3.  gestational age  - NICU consult completed  - continue routine  care including 3 hr glucose at least a week out from BMZ.    4. Carpal tunnel  - will call [facility] to see if injection is possible or to at least set her up in clinic after discharge      Tao Zamorano MD   "

## 2021-08-06 NOTE — PROGRESS NOTES
Pt to discharge to home. All questions and concerns addressed. Pt verbalizes understanding of plan of care.

## 2021-08-06 NOTE — PLAN OF CARE
VSS. BPP in hospital completed this morning. Fetal monitoring changed to TID NST. FHTs moderate variability, +accels and no decels. Pt c/o mild discomfort from carpal tunnel in right hand/wrist. Ice pack given for comfort with some relief. Second dose of betamethasone to be administered this afternoon. Pt up ad kaylyn in room, into shower. Rare uterine contractions noted on toco, pt not feeling uterine contractions. Cont to monitor and assess.

## 2021-08-06 NOTE — CONSULTS
_       [facility]                      Neonatology Advanced Practice Antepartum Counseling Consult      I was asked to provide antepartum counseling for Maegan German at the request of Heide Ravi MD secondary to severe fetal growth restriction, chronic hypertension, non reassuring non stress test. Ms. German is currently 30.5 weeks and has a hx significant for chronic hypertension, previous late  infant. Betamethasone was administered on  and second dose planned for . Ms. German was counseled on the expected hospital course, potential risks, and outcomes associated with an infant born at approximately 31 weeks gestation. The counseling included: morbidity, mortality, initial delivery room stabilization, respiratory course, lung development, RDS, retinopathy of prematurity, hyperbilirubinemia, hemodynamic support, infection, intraventricular hemorrhage, nutrition, growth and development, and long term outcomes. Please feel free to call with any additional questions or concerns.          JULIAN Charles NNP-BC 2021 8:39 PM     Advanced Practice Service    Intensive Care Unit  [facility]      Floor Time (min): 10  Face to Face Time (min): 20  Total Time (minutes): 30  More than 50% of my time was spent in direct, face to face, antepartum counseling with the above patient.

## 2021-08-09 ENCOUNTER — OFFICE VISIT (OUTPATIENT)
Dept: MATERNAL FETAL MEDICINE | Facility: CLINIC | Age: 41
End: 2021-08-09
Attending: OBSTETRICS & GYNECOLOGY
Payer: COMMERCIAL

## 2021-08-09 ENCOUNTER — HOSPITAL ENCOUNTER (OUTPATIENT)
Dept: ULTRASOUND IMAGING | Facility: CLINIC | Age: 41
End: 2021-08-09
Attending: OBSTETRICS & GYNECOLOGY
Payer: COMMERCIAL

## 2021-08-09 DIAGNOSIS — O36.5990 PREGNANCY AFFECTED BY FETAL GROWTH RESTRICTION: ICD-10-CM

## 2021-08-09 DIAGNOSIS — O36.5990 PREGNANCY AFFECTED BY FETAL GROWTH RESTRICTION: Primary | ICD-10-CM

## 2021-08-09 PROCEDURE — 76820 UMBILICAL ARTERY ECHO: CPT

## 2021-08-09 PROCEDURE — 59025 FETAL NON-STRESS TEST: CPT

## 2021-08-09 PROCEDURE — 76820 UMBILICAL ARTERY ECHO: CPT | Mod: 26 | Performed by: OBSTETRICS & GYNECOLOGY

## 2021-08-09 PROCEDURE — 76815 OB US LIMITED FETUS(S): CPT | Mod: 26 | Performed by: OBSTETRICS & GYNECOLOGY

## 2021-08-09 PROCEDURE — 59025 FETAL NON-STRESS TEST: CPT | Mod: 26 | Performed by: OBSTETRICS & GYNECOLOGY

## 2021-08-10 NOTE — PROGRESS NOTES
"Please see \"Imaging\" tab under Chart Review for full details.    Janelle Mckeon MD  Maternal Fetal Medicine    "

## 2021-08-12 ENCOUNTER — OFFICE VISIT (OUTPATIENT)
Dept: MATERNAL FETAL MEDICINE | Facility: CLINIC | Age: 41
End: 2021-08-12
Attending: OBSTETRICS & GYNECOLOGY
Payer: COMMERCIAL

## 2021-08-12 ENCOUNTER — HOSPITAL ENCOUNTER (OUTPATIENT)
Dept: ULTRASOUND IMAGING | Facility: CLINIC | Age: 41
End: 2021-08-12
Attending: OBSTETRICS & GYNECOLOGY
Payer: COMMERCIAL

## 2021-08-12 DIAGNOSIS — O36.5990 PREGNANCY AFFECTED BY FETAL GROWTH RESTRICTION: ICD-10-CM

## 2021-08-12 PROCEDURE — 76820 UMBILICAL ARTERY ECHO: CPT | Mod: 26 | Performed by: OBSTETRICS & GYNECOLOGY

## 2021-08-12 PROCEDURE — 59025 FETAL NON-STRESS TEST: CPT

## 2021-08-12 PROCEDURE — 59025 FETAL NON-STRESS TEST: CPT | Mod: 26 | Performed by: OBSTETRICS & GYNECOLOGY

## 2021-08-12 PROCEDURE — 76816 OB US FOLLOW-UP PER FETUS: CPT

## 2021-08-12 PROCEDURE — 76816 OB US FOLLOW-UP PER FETUS: CPT | Mod: 26 | Performed by: OBSTETRICS & GYNECOLOGY

## 2021-08-12 NOTE — NURSING NOTE
NST performed due to FGR.  Dr. Valverde reviewed efm tracing. See NST/BPP Doc Flowsheet tab. Refer to MD notes in MFM U/S report from today.

## 2021-08-16 ENCOUNTER — HOSPITAL ENCOUNTER (OUTPATIENT)
Dept: ULTRASOUND IMAGING | Facility: CLINIC | Age: 41
End: 2021-08-16
Attending: OBSTETRICS & GYNECOLOGY
Payer: COMMERCIAL

## 2021-08-16 ENCOUNTER — OFFICE VISIT (OUTPATIENT)
Dept: MATERNAL FETAL MEDICINE | Facility: CLINIC | Age: 41
End: 2021-08-16
Attending: OBSTETRICS & GYNECOLOGY
Payer: COMMERCIAL

## 2021-08-16 DIAGNOSIS — O36.5990 PREGNANCY AFFECTED BY FETAL GROWTH RESTRICTION: ICD-10-CM

## 2021-08-16 PROCEDURE — 76820 UMBILICAL ARTERY ECHO: CPT | Mod: 26 | Performed by: OBSTETRICS & GYNECOLOGY

## 2021-08-16 PROCEDURE — 59025 FETAL NON-STRESS TEST: CPT | Mod: 26 | Performed by: OBSTETRICS & GYNECOLOGY

## 2021-08-16 PROCEDURE — 76815 OB US LIMITED FETUS(S): CPT | Mod: 26 | Performed by: OBSTETRICS & GYNECOLOGY

## 2021-08-16 PROCEDURE — 76815 OB US LIMITED FETUS(S): CPT

## 2021-08-16 PROCEDURE — 59025 FETAL NON-STRESS TEST: CPT | Performed by: OBSTETRICS & GYNECOLOGY

## 2021-08-16 NOTE — NURSING NOTE
NST Performed due to FGR.   reviewed efm tracing. See NST/BPP Doc Flowsheet tab.    Vanessa España RN

## 2021-08-19 ENCOUNTER — HOSPITAL ENCOUNTER (OUTPATIENT)
Dept: ULTRASOUND IMAGING | Facility: CLINIC | Age: 41
End: 2021-08-19
Attending: OBSTETRICS & GYNECOLOGY
Payer: COMMERCIAL

## 2021-08-19 ENCOUNTER — OFFICE VISIT (OUTPATIENT)
Dept: MATERNAL FETAL MEDICINE | Facility: CLINIC | Age: 41
End: 2021-08-19
Attending: OBSTETRICS & GYNECOLOGY
Payer: COMMERCIAL

## 2021-08-19 DIAGNOSIS — O36.5990 PREGNANCY AFFECTED BY FETAL GROWTH RESTRICTION: ICD-10-CM

## 2021-08-19 DIAGNOSIS — O36.5990 PREGNANCY AFFECTED BY FETAL GROWTH RESTRICTION: Primary | ICD-10-CM

## 2021-08-19 PROCEDURE — 59025 FETAL NON-STRESS TEST: CPT

## 2021-08-19 PROCEDURE — 76820 UMBILICAL ARTERY ECHO: CPT | Mod: 26 | Performed by: OBSTETRICS & GYNECOLOGY

## 2021-08-19 PROCEDURE — 76815 OB US LIMITED FETUS(S): CPT | Mod: 26 | Performed by: OBSTETRICS & GYNECOLOGY

## 2021-08-19 PROCEDURE — 76820 UMBILICAL ARTERY ECHO: CPT

## 2021-08-19 PROCEDURE — 59025 FETAL NON-STRESS TEST: CPT | Mod: 26 | Performed by: OBSTETRICS & GYNECOLOGY

## 2021-08-23 ENCOUNTER — HOSPITAL ENCOUNTER (OUTPATIENT)
Dept: ULTRASOUND IMAGING | Facility: CLINIC | Age: 41
End: 2021-08-23
Attending: OBSTETRICS & GYNECOLOGY
Payer: COMMERCIAL

## 2021-08-23 ENCOUNTER — OFFICE VISIT (OUTPATIENT)
Dept: MATERNAL FETAL MEDICINE | Facility: CLINIC | Age: 41
End: 2021-08-23
Attending: OBSTETRICS & GYNECOLOGY
Payer: COMMERCIAL

## 2021-08-23 DIAGNOSIS — O36.5990 PREGNANCY AFFECTED BY FETAL GROWTH RESTRICTION: Primary | ICD-10-CM

## 2021-08-23 DIAGNOSIS — O36.5990 PREGNANCY AFFECTED BY FETAL GROWTH RESTRICTION: ICD-10-CM

## 2021-08-23 PROCEDURE — 76820 UMBILICAL ARTERY ECHO: CPT | Mod: 26 | Performed by: OBSTETRICS & GYNECOLOGY

## 2021-08-23 PROCEDURE — 76815 OB US LIMITED FETUS(S): CPT

## 2021-08-23 PROCEDURE — 59025 FETAL NON-STRESS TEST: CPT

## 2021-08-23 PROCEDURE — 76815 OB US LIMITED FETUS(S): CPT | Mod: 26 | Performed by: OBSTETRICS & GYNECOLOGY

## 2021-08-23 PROCEDURE — 59025 FETAL NON-STRESS TEST: CPT | Mod: 26 | Performed by: OBSTETRICS & GYNECOLOGY

## 2021-08-23 NOTE — PROGRESS NOTES
"Please see \"imaging\" tab under \"Chart Review\" for details of today's US at the [facility].    Aaron Mclain MD  Maternal-Fetal Medicine    "

## 2021-08-26 ENCOUNTER — OFFICE VISIT (OUTPATIENT)
Dept: MATERNAL FETAL MEDICINE | Facility: CLINIC | Age: 41
End: 2021-08-26
Attending: OBSTETRICS & GYNECOLOGY
Payer: COMMERCIAL

## 2021-08-26 ENCOUNTER — HOSPITAL ENCOUNTER (OUTPATIENT)
Dept: ULTRASOUND IMAGING | Facility: CLINIC | Age: 41
End: 2021-08-26
Attending: OBSTETRICS & GYNECOLOGY
Payer: COMMERCIAL

## 2021-08-26 DIAGNOSIS — O36.5990 PREGNANCY AFFECTED BY FETAL GROWTH RESTRICTION: Primary | ICD-10-CM

## 2021-08-26 DIAGNOSIS — O36.5990 PREGNANCY AFFECTED BY FETAL GROWTH RESTRICTION: ICD-10-CM

## 2021-08-26 PROCEDURE — 76818 FETAL BIOPHYS PROFILE W/NST: CPT

## 2021-08-26 PROCEDURE — 76820 UMBILICAL ARTERY ECHO: CPT | Mod: 26 | Performed by: OBSTETRICS & GYNECOLOGY

## 2021-08-26 PROCEDURE — 76818 FETAL BIOPHYS PROFILE W/NST: CPT | Mod: 26 | Performed by: OBSTETRICS & GYNECOLOGY

## 2021-08-26 PROCEDURE — 76816 OB US FOLLOW-UP PER FETUS: CPT

## 2021-08-26 PROCEDURE — 76816 OB US FOLLOW-UP PER FETUS: CPT | Mod: 26 | Performed by: OBSTETRICS & GYNECOLOGY

## 2021-08-26 NOTE — NURSING NOTE
NST Performed due to FGR.   reviewed efm tracing. See NST/BPP Doc Flowsheet tab. NR but reassuring tracing with extra monitoring time. BPP performed.    Vanessa España RN

## 2021-08-30 ENCOUNTER — HOSPITAL ENCOUNTER (OUTPATIENT)
Dept: ULTRASOUND IMAGING | Facility: CLINIC | Age: 41
End: 2021-08-30
Attending: OBSTETRICS & GYNECOLOGY
Payer: COMMERCIAL

## 2021-08-30 ENCOUNTER — OFFICE VISIT (OUTPATIENT)
Dept: MATERNAL FETAL MEDICINE | Facility: CLINIC | Age: 41
End: 2021-08-30
Attending: OBSTETRICS & GYNECOLOGY
Payer: COMMERCIAL

## 2021-08-30 DIAGNOSIS — O36.5990 PREGNANCY AFFECTED BY FETAL GROWTH RESTRICTION: ICD-10-CM

## 2021-08-30 PROCEDURE — 59025 FETAL NON-STRESS TEST: CPT

## 2021-08-30 PROCEDURE — 76820 UMBILICAL ARTERY ECHO: CPT

## 2021-08-30 PROCEDURE — 76815 OB US LIMITED FETUS(S): CPT | Mod: 26 | Performed by: OBSTETRICS & GYNECOLOGY

## 2021-08-30 PROCEDURE — 59025 FETAL NON-STRESS TEST: CPT | Mod: 26 | Performed by: OBSTETRICS & GYNECOLOGY

## 2021-08-30 PROCEDURE — 76820 UMBILICAL ARTERY ECHO: CPT | Mod: 26 | Performed by: OBSTETRICS & GYNECOLOGY

## 2021-09-02 ENCOUNTER — HOSPITAL ENCOUNTER (OUTPATIENT)
Dept: ULTRASOUND IMAGING | Facility: CLINIC | Age: 41
End: 2021-09-02
Attending: OBSTETRICS & GYNECOLOGY
Payer: COMMERCIAL

## 2021-09-02 ENCOUNTER — OFFICE VISIT (OUTPATIENT)
Dept: MATERNAL FETAL MEDICINE | Facility: CLINIC | Age: 41
End: 2021-09-02
Attending: OBSTETRICS & GYNECOLOGY
Payer: COMMERCIAL

## 2021-09-02 DIAGNOSIS — O36.5990 PREGNANCY AFFECTED BY FETAL GROWTH RESTRICTION: ICD-10-CM

## 2021-09-02 DIAGNOSIS — O36.5990 PREGNANCY AFFECTED BY FETAL GROWTH RESTRICTION: Primary | ICD-10-CM

## 2021-09-02 PROCEDURE — 59025 FETAL NON-STRESS TEST: CPT

## 2021-09-02 PROCEDURE — 76820 UMBILICAL ARTERY ECHO: CPT | Mod: 26 | Performed by: OBSTETRICS & GYNECOLOGY

## 2021-09-02 PROCEDURE — 59025 FETAL NON-STRESS TEST: CPT | Mod: 26 | Performed by: OBSTETRICS & GYNECOLOGY

## 2021-09-02 PROCEDURE — 76815 OB US LIMITED FETUS(S): CPT

## 2021-09-02 PROCEDURE — 76815 OB US LIMITED FETUS(S): CPT | Mod: 26 | Performed by: OBSTETRICS & GYNECOLOGY

## 2021-09-02 NOTE — PROGRESS NOTES
The patient was seen for an ultrasound in the Maternal-Fetal Medicine Center at the [facility] today.  For a detailed report of the ultrasound examination, please see the ultrasound report which can be found under the imaging tab.    Yahaira Rodriguez MD  , OB/GYN  Maternal-Fetal Medicine  [phone] (Pager)

## 2021-09-05 ENCOUNTER — HEALTH MAINTENANCE LETTER (OUTPATIENT)
Age: 41
End: 2021-09-05

## 2021-09-07 ENCOUNTER — OFFICE VISIT (OUTPATIENT)
Dept: MATERNAL FETAL MEDICINE | Facility: CLINIC | Age: 41
End: 2021-09-07
Attending: OBSTETRICS & GYNECOLOGY
Payer: COMMERCIAL

## 2021-09-07 ENCOUNTER — HOSPITAL ENCOUNTER (OUTPATIENT)
Dept: ULTRASOUND IMAGING | Facility: CLINIC | Age: 41
End: 2021-09-07
Attending: OBSTETRICS & GYNECOLOGY
Payer: COMMERCIAL

## 2021-09-07 DIAGNOSIS — O36.5990 PREGNANCY AFFECTED BY FETAL GROWTH RESTRICTION: ICD-10-CM

## 2021-09-07 PROCEDURE — 76820 UMBILICAL ARTERY ECHO: CPT | Mod: 26 | Performed by: OBSTETRICS & GYNECOLOGY

## 2021-09-07 PROCEDURE — 76819 FETAL BIOPHYS PROFIL W/O NST: CPT

## 2021-09-07 PROCEDURE — 76818 FETAL BIOPHYS PROFILE W/NST: CPT | Mod: 26 | Performed by: OBSTETRICS & GYNECOLOGY

## 2021-09-07 NOTE — NURSING NOTE
NST performed due to FGR.  Dr. Green reviewed efm tracing. See NST/BPP Doc Flowsheet tab. Refer to MD notes in [facility] ultrasound from today.

## 2021-09-09 ENCOUNTER — OFFICE VISIT (OUTPATIENT)
Dept: MATERNAL FETAL MEDICINE | Facility: CLINIC | Age: 41
End: 2021-09-09
Attending: OBSTETRICS & GYNECOLOGY
Payer: COMMERCIAL

## 2021-09-09 ENCOUNTER — HOSPITAL ENCOUNTER (OUTPATIENT)
Dept: ULTRASOUND IMAGING | Facility: CLINIC | Age: 41
End: 2021-09-09
Attending: OBSTETRICS & GYNECOLOGY
Payer: COMMERCIAL

## 2021-09-09 DIAGNOSIS — O36.5990 PREGNANCY AFFECTED BY FETAL GROWTH RESTRICTION: Primary | ICD-10-CM

## 2021-09-09 DIAGNOSIS — O36.5990 PREGNANCY AFFECTED BY FETAL GROWTH RESTRICTION: ICD-10-CM

## 2021-09-09 PROCEDURE — 76816 OB US FOLLOW-UP PER FETUS: CPT | Mod: 26 | Performed by: OBSTETRICS & GYNECOLOGY

## 2021-09-09 PROCEDURE — 76820 UMBILICAL ARTERY ECHO: CPT | Mod: 26 | Performed by: OBSTETRICS & GYNECOLOGY

## 2021-09-09 PROCEDURE — 76819 FETAL BIOPHYS PROFIL W/O NST: CPT

## 2021-09-09 PROCEDURE — 76818 FETAL BIOPHYS PROFILE W/NST: CPT | Mod: 26 | Performed by: OBSTETRICS & GYNECOLOGY

## 2021-09-09 PROCEDURE — 76816 OB US FOLLOW-UP PER FETUS: CPT

## 2021-09-09 NOTE — NURSING NOTE
NST performed due to FGR.  Dr. Rodriguez reviewed efm tracing. See NST/BPP Doc Flowsheet tab. Refer to MD notes in MFM ultrasound report from today.

## 2021-09-09 NOTE — PROGRESS NOTES
The patient was seen for an ultrasound in the Maternal-Fetal Medicine Center at the [facility] today.  For a detailed report of the ultrasound examination, please see the ultrasound report which can be found under the imaging tab.    Yahaira Rodriguez MD  , OB/GYN  Maternal-Fetal Medicine  [phone] (Pager)

## 2021-09-10 ENCOUNTER — TRANSFERRED RECORDS (OUTPATIENT)
Dept: HEALTH INFORMATION MANAGEMENT | Facility: CLINIC | Age: 41
End: 2021-09-10

## 2021-09-16 ENCOUNTER — HOSPITAL ENCOUNTER (OUTPATIENT)
Dept: ULTRASOUND IMAGING | Facility: CLINIC | Age: 41
End: 2021-09-16
Attending: OBSTETRICS & GYNECOLOGY
Payer: COMMERCIAL

## 2021-09-16 ENCOUNTER — OFFICE VISIT (OUTPATIENT)
Dept: MATERNAL FETAL MEDICINE | Facility: CLINIC | Age: 41
End: 2021-09-16
Attending: OBSTETRICS & GYNECOLOGY
Payer: COMMERCIAL

## 2021-09-16 DIAGNOSIS — O36.5990 PREGNANCY AFFECTED BY FETAL GROWTH RESTRICTION: Primary | ICD-10-CM

## 2021-09-16 DIAGNOSIS — O10.919 CHRONIC HYPERTENSION AFFECTING PREGNANCY: ICD-10-CM

## 2021-09-16 DIAGNOSIS — O36.5990 PREGNANCY AFFECTED BY FETAL GROWTH RESTRICTION: ICD-10-CM

## 2021-09-16 PROCEDURE — 76818 FETAL BIOPHYS PROFILE W/NST: CPT | Mod: 26 | Performed by: OBSTETRICS & GYNECOLOGY

## 2021-09-16 PROCEDURE — 76820 UMBILICAL ARTERY ECHO: CPT | Mod: 26 | Performed by: OBSTETRICS & GYNECOLOGY

## 2021-09-16 PROCEDURE — 76815 OB US LIMITED FETUS(S): CPT | Mod: 26 | Performed by: OBSTETRICS & GYNECOLOGY

## 2021-09-16 PROCEDURE — 76819 FETAL BIOPHYS PROFIL W/O NST: CPT

## 2021-09-16 NOTE — PROGRESS NOTES
The patient was seen for an ultrasound in the Maternal-Fetal Medicine Center at the [facility] today.  For a detailed report of the ultrasound examination, please see the ultrasound report which can be found under the imaging tab.    Yahaira Rodriguez MD  , OB/GYN  Maternal-Fetal Medicine  [phone] (Pager)

## 2021-09-16 NOTE — NURSING NOTE
NST performed due to FGR.   reviewed efm tracing. See NST/BPP Doc Flowsheet tab. Refer to MD notes in MFM ultrasound report from today.

## 2021-09-23 ENCOUNTER — HOSPITAL ENCOUNTER (OUTPATIENT)
Dept: ULTRASOUND IMAGING | Facility: CLINIC | Age: 41
End: 2021-09-23
Attending: OBSTETRICS & GYNECOLOGY
Payer: COMMERCIAL

## 2021-09-23 ENCOUNTER — OFFICE VISIT (OUTPATIENT)
Dept: MATERNAL FETAL MEDICINE | Facility: CLINIC | Age: 41
End: 2021-09-23
Attending: OBSTETRICS & GYNECOLOGY
Payer: COMMERCIAL

## 2021-09-23 DIAGNOSIS — O36.5990 PREGNANCY AFFECTED BY FETAL GROWTH RESTRICTION: ICD-10-CM

## 2021-09-23 DIAGNOSIS — O36.5990 PREGNANCY AFFECTED BY FETAL GROWTH RESTRICTION: Primary | ICD-10-CM

## 2021-09-23 PROCEDURE — 76820 UMBILICAL ARTERY ECHO: CPT | Mod: 26 | Performed by: OBSTETRICS & GYNECOLOGY

## 2021-09-23 PROCEDURE — 76815 OB US LIMITED FETUS(S): CPT | Mod: 26 | Performed by: OBSTETRICS & GYNECOLOGY

## 2021-09-23 PROCEDURE — 76820 UMBILICAL ARTERY ECHO: CPT

## 2021-09-23 PROCEDURE — 59025 FETAL NON-STRESS TEST: CPT | Mod: 26 | Performed by: OBSTETRICS & GYNECOLOGY

## 2021-09-23 PROCEDURE — 59025 FETAL NON-STRESS TEST: CPT

## 2021-09-27 ENCOUNTER — LAB (OUTPATIENT)
Dept: URGENT CARE | Facility: URGENT CARE | Age: 41
End: 2021-09-27
Payer: COMMERCIAL

## 2021-09-27 DIAGNOSIS — Z11.59 ENCOUNTER FOR SCREENING FOR OTHER VIRAL DISEASES: ICD-10-CM

## 2021-09-27 PROCEDURE — U0003 INFECTIOUS AGENT DETECTION BY NUCLEIC ACID (DNA OR RNA); SEVERE ACUTE RESPIRATORY SYNDROME CORONAVIRUS 2 (SARS-COV-2) (CORONAVIRUS DISEASE [COVID-19]), AMPLIFIED PROBE TECHNIQUE, MAKING USE OF HIGH THROUGHPUT TECHNOLOGIES AS DESCRIBED BY CMS-2020-01-R: HCPCS

## 2021-09-27 PROCEDURE — U0005 INFEC AGEN DETEC AMPLI PROBE: HCPCS

## 2021-09-27 PROCEDURE — 99207 PR NO CHARGE LOS: CPT

## 2021-09-28 LAB — SARS-COV-2 RNA RESP QL NAA+PROBE: NEGATIVE

## 2021-09-29 ENCOUNTER — LAB (OUTPATIENT)
Dept: LAB | Facility: CLINIC | Age: 41
End: 2021-09-29
Attending: OBSTETRICS & GYNECOLOGY
Payer: COMMERCIAL

## 2021-09-29 ENCOUNTER — ANESTHESIA EVENT (OUTPATIENT)
Dept: OBGYN | Facility: CLINIC | Age: 41
End: 2021-09-29
Payer: COMMERCIAL

## 2021-09-29 DIAGNOSIS — Z01.812 ENCOUNTER FOR PREPROCEDURAL LABORATORY EXAMINATION: Primary | ICD-10-CM

## 2021-09-29 LAB
ABO/RH(D): NORMAL
ANTIBODY SCREEN: NEGATIVE
BASOPHILS # BLD AUTO: 0 10E3/UL (ref 0–0.2)
BASOPHILS NFR BLD AUTO: 0 %
EOSINOPHIL # BLD AUTO: 0.1 10E3/UL (ref 0–0.7)
EOSINOPHIL NFR BLD AUTO: 2 %
ERYTHROCYTE [DISTWIDTH] IN BLOOD BY AUTOMATED COUNT: 13.9 % (ref 10–15)
HCT VFR BLD AUTO: 36.8 % (ref 35–47)
HGB BLD-MCNC: 12.2 G/DL (ref 11.7–15.7)
IMM GRANULOCYTES # BLD: 0.1 10E3/UL
IMM GRANULOCYTES NFR BLD: 1 %
LYMPHOCYTES # BLD AUTO: 2.4 10E3/UL (ref 0.8–5.3)
LYMPHOCYTES NFR BLD AUTO: 34 %
MCH RBC QN AUTO: 28.1 PG (ref 26.5–33)
MCHC RBC AUTO-ENTMCNC: 33.2 G/DL (ref 31.5–36.5)
MCV RBC AUTO: 85 FL (ref 78–100)
MONOCYTES # BLD AUTO: 0.6 10E3/UL (ref 0–1.3)
MONOCYTES NFR BLD AUTO: 8 %
NEUTROPHILS # BLD AUTO: 4 10E3/UL (ref 1.6–8.3)
NEUTROPHILS NFR BLD AUTO: 55 %
NRBC # BLD AUTO: 0 10E3/UL
NRBC BLD AUTO-RTO: 0 /100
PLATELET # BLD AUTO: 211 10E3/UL (ref 150–450)
RBC # BLD AUTO: 4.34 10E6/UL (ref 3.8–5.2)
SPECIMEN EXPIRATION DATE: NORMAL
T PALLIDUM AB SER QL: NONREACTIVE
WBC # BLD AUTO: 7.1 10E3/UL (ref 4–11)

## 2021-09-29 PROCEDURE — 36415 COLL VENOUS BLD VENIPUNCTURE: CPT

## 2021-09-29 PROCEDURE — 86900 BLOOD TYPING SEROLOGIC ABO: CPT

## 2021-09-29 PROCEDURE — 86780 TREPONEMA PALLIDUM: CPT

## 2021-09-29 PROCEDURE — 85025 COMPLETE CBC W/AUTO DIFF WBC: CPT

## 2021-09-30 ENCOUNTER — ANESTHESIA (OUTPATIENT)
Dept: OBGYN | Facility: CLINIC | Age: 41
End: 2021-09-30
Payer: COMMERCIAL

## 2021-09-30 ENCOUNTER — HOSPITAL ENCOUNTER (INPATIENT)
Facility: CLINIC | Age: 41
LOS: 3 days | Discharge: HOME | End: 2021-10-03
Attending: OBSTETRICS & GYNECOLOGY | Admitting: OBSTETRICS & GYNECOLOGY
Payer: COMMERCIAL

## 2021-09-30 DIAGNOSIS — Z98.891 S/P CESAREAN SECTION: Primary | ICD-10-CM

## 2021-09-30 LAB
AMPHETAMINES UR QL SCN: NORMAL
CANNABINOIDS UR QL SCN: NORMAL
COCAINE UR QL: NORMAL
OPIATES UR QL SCN: NORMAL
PCP UR QL SCN: NORMAL

## 2021-09-30 PROCEDURE — 80307 DRUG TEST PRSMV CHEM ANLYZR: CPT | Performed by: OBSTETRICS & GYNECOLOGY

## 2021-09-30 PROCEDURE — 250N000011 HC RX IP 250 OP 636: Performed by: OBSTETRICS & GYNECOLOGY

## 2021-09-30 PROCEDURE — 250N000009 HC RX 250: Performed by: NURSE ANESTHETIST, CERTIFIED REGISTERED

## 2021-09-30 PROCEDURE — 258N000003 HC RX IP 258 OP 636: Performed by: OBSTETRICS & GYNECOLOGY

## 2021-09-30 PROCEDURE — 250N000009 HC RX 250: Performed by: OBSTETRICS & GYNECOLOGY

## 2021-09-30 PROCEDURE — 250N000011 HC RX IP 250 OP 636: Performed by: ANESTHESIOLOGY

## 2021-09-30 PROCEDURE — 258N000003 HC RX IP 258 OP 636: Performed by: NURSE ANESTHETIST, CERTIFIED REGISTERED

## 2021-09-30 PROCEDURE — 370N000017 HC ANESTHESIA TECHNICAL FEE, PER MIN: Performed by: OBSTETRICS & GYNECOLOGY

## 2021-09-30 PROCEDURE — 250N000011 HC RX IP 250 OP 636: Performed by: NURSE ANESTHETIST, CERTIFIED REGISTERED

## 2021-09-30 PROCEDURE — 272N000001 HC OR GENERAL SUPPLY STERILE: Performed by: OBSTETRICS & GYNECOLOGY

## 2021-09-30 PROCEDURE — 360N000076 HC SURGERY LEVEL 3, PER MIN: Performed by: OBSTETRICS & GYNECOLOGY

## 2021-09-30 PROCEDURE — 250N000013 HC RX MED GY IP 250 OP 250 PS 637: Performed by: OBSTETRICS & GYNECOLOGY

## 2021-09-30 PROCEDURE — 120N000012 HC R&B POSTPARTUM

## 2021-09-30 PROCEDURE — 710N000009 HC RECOVERY PHASE 1, LEVEL 1, PER MIN: Performed by: OBSTETRICS & GYNECOLOGY

## 2021-09-30 RX ORDER — OXYTOCIN/0.9 % SODIUM CHLORIDE 30/500 ML
PLASTIC BAG, INJECTION (ML) INTRAVENOUS CONTINUOUS PRN
Status: DISCONTINUED | OUTPATIENT
Start: 2021-09-30 | End: 2021-09-30

## 2021-09-30 RX ORDER — ACETAMINOPHEN 325 MG/1
975 TABLET ORAL ONCE
Status: COMPLETED | OUTPATIENT
Start: 2021-09-30 | End: 2021-09-30

## 2021-09-30 RX ORDER — FENTANYL CITRATE 50 UG/ML
50 INJECTION, SOLUTION INTRAMUSCULAR; INTRAVENOUS EVERY 5 MIN PRN
Status: DISCONTINUED | OUTPATIENT
Start: 2021-09-30 | End: 2021-09-30 | Stop reason: HOSPADM

## 2021-09-30 RX ORDER — ONDANSETRON 2 MG/ML
4 INJECTION INTRAMUSCULAR; INTRAVENOUS EVERY 30 MIN PRN
Status: DISCONTINUED | OUTPATIENT
Start: 2021-09-30 | End: 2021-09-30 | Stop reason: HOSPADM

## 2021-09-30 RX ORDER — AMOXICILLIN 250 MG
1 CAPSULE ORAL 2 TIMES DAILY
Status: DISCONTINUED | OUTPATIENT
Start: 2021-09-30 | End: 2021-10-03 | Stop reason: HOSPADM

## 2021-09-30 RX ORDER — ONDANSETRON 4 MG/1
4 TABLET, ORALLY DISINTEGRATING ORAL EVERY 6 HOURS PRN
Status: DISCONTINUED | OUTPATIENT
Start: 2021-09-30 | End: 2021-09-30 | Stop reason: HOSPADM

## 2021-09-30 RX ORDER — FENTANYL CITRATE 50 UG/ML
INJECTION, SOLUTION INTRAMUSCULAR; INTRAVENOUS
Status: COMPLETED | OUTPATIENT
Start: 2021-09-30 | End: 2021-09-30

## 2021-09-30 RX ORDER — FENTANYL CITRATE-0.9 % NACL/PF 10 MCG/ML
100 PLASTIC BAG, INJECTION (ML) INTRAVENOUS EVERY 5 MIN PRN
Status: DISCONTINUED | OUTPATIENT
Start: 2021-09-30 | End: 2021-09-30 | Stop reason: HOSPADM

## 2021-09-30 RX ORDER — IBUPROFEN 400 MG/1
800 TABLET, FILM COATED ORAL EVERY 6 HOURS
Status: DISCONTINUED | OUTPATIENT
Start: 2021-10-01 | End: 2021-10-03 | Stop reason: HOSPADM

## 2021-09-30 RX ORDER — ONDANSETRON 2 MG/ML
4 INJECTION INTRAMUSCULAR; INTRAVENOUS EVERY 6 HOURS PRN
Status: DISCONTINUED | OUTPATIENT
Start: 2021-09-30 | End: 2021-10-03 | Stop reason: HOSPADM

## 2021-09-30 RX ORDER — OXYCODONE HYDROCHLORIDE 5 MG/1
5 TABLET ORAL EVERY 4 HOURS PRN
Status: DISCONTINUED | OUTPATIENT
Start: 2021-09-30 | End: 2021-10-02

## 2021-09-30 RX ORDER — OXYTOCIN 10 [USP'U]/ML
10 INJECTION, SOLUTION INTRAMUSCULAR; INTRAVENOUS
Status: DISCONTINUED | OUTPATIENT
Start: 2021-09-30 | End: 2021-09-30 | Stop reason: HOSPADM

## 2021-09-30 RX ORDER — SCOLOPAMINE TRANSDERMAL SYSTEM 1 MG/1
1 PATCH, EXTENDED RELEASE TRANSDERMAL
Status: DISCONTINUED | OUTPATIENT
Start: 2021-09-30 | End: 2021-10-03 | Stop reason: HOSPADM

## 2021-09-30 RX ORDER — NALOXONE HYDROCHLORIDE 0.4 MG/ML
0.2 INJECTION, SOLUTION INTRAMUSCULAR; INTRAVENOUS; SUBCUTANEOUS
Status: DISCONTINUED | OUTPATIENT
Start: 2021-09-30 | End: 2021-10-03 | Stop reason: HOSPADM

## 2021-09-30 RX ORDER — METOCLOPRAMIDE HYDROCHLORIDE 5 MG/ML
10 INJECTION INTRAMUSCULAR; INTRAVENOUS EVERY 6 HOURS PRN
Status: DISCONTINUED | OUTPATIENT
Start: 2021-09-30 | End: 2021-10-03 | Stop reason: HOSPADM

## 2021-09-30 RX ORDER — MULTIVITAMIN WITH IRON
1 TABLET ORAL DAILY
Status: ON HOLD | COMMUNITY
End: 2021-09-30

## 2021-09-30 RX ORDER — SIMETHICONE 80 MG
80 TABLET,CHEWABLE ORAL 4 TIMES DAILY PRN
Status: DISCONTINUED | OUTPATIENT
Start: 2021-09-30 | End: 2021-10-03 | Stop reason: HOSPADM

## 2021-09-30 RX ORDER — LIDOCAINE 40 MG/G
CREAM TOPICAL
Status: DISCONTINUED | OUTPATIENT
Start: 2021-09-30 | End: 2021-10-03 | Stop reason: HOSPADM

## 2021-09-30 RX ORDER — LABETALOL 100 MG/1
100 TABLET, FILM COATED ORAL 2 TIMES DAILY
Status: ON HOLD | COMMUNITY
End: 2021-10-03

## 2021-09-30 RX ORDER — BUPIVACAINE HYDROCHLORIDE 7.5 MG/ML
INJECTION, SOLUTION INTRASPINAL
Status: COMPLETED | OUTPATIENT
Start: 2021-09-30 | End: 2021-09-30

## 2021-09-30 RX ORDER — NALBUPHINE HYDROCHLORIDE 10 MG/ML
2.5-5 INJECTION, SOLUTION INTRAMUSCULAR; INTRAVENOUS; SUBCUTANEOUS EVERY 6 HOURS PRN
Status: DISCONTINUED | OUTPATIENT
Start: 2021-09-30 | End: 2021-10-03 | Stop reason: HOSPADM

## 2021-09-30 RX ORDER — GENTAMICIN SULFATE 100 MG/100ML
2 INJECTION, SOLUTION INTRAVENOUS
Status: COMPLETED | OUTPATIENT
Start: 2021-09-30 | End: 2021-09-30

## 2021-09-30 RX ORDER — MISOPROSTOL 200 UG/1
800 TABLET ORAL
Status: DISCONTINUED | OUTPATIENT
Start: 2021-09-30 | End: 2021-10-03 | Stop reason: HOSPADM

## 2021-09-30 RX ORDER — CARBOPROST TROMETHAMINE 250 UG/ML
250 INJECTION, SOLUTION INTRAMUSCULAR
Status: DISCONTINUED | OUTPATIENT
Start: 2021-09-30 | End: 2021-10-03 | Stop reason: HOSPADM

## 2021-09-30 RX ORDER — OXYTOCIN/0.9 % SODIUM CHLORIDE 30/500 ML
340 PLASTIC BAG, INJECTION (ML) INTRAVENOUS CONTINUOUS PRN
Status: DISCONTINUED | OUTPATIENT
Start: 2021-09-30 | End: 2021-10-03 | Stop reason: HOSPADM

## 2021-09-30 RX ORDER — MISOPROSTOL 200 UG/1
800 TABLET ORAL
Status: DISCONTINUED | OUTPATIENT
Start: 2021-09-30 | End: 2021-09-30 | Stop reason: HOSPADM

## 2021-09-30 RX ORDER — TRANEXAMIC ACID 10 MG/ML
1 INJECTION, SOLUTION INTRAVENOUS EVERY 30 MIN PRN
Status: DISCONTINUED | OUTPATIENT
Start: 2021-09-30 | End: 2021-10-03 | Stop reason: HOSPADM

## 2021-09-30 RX ORDER — CITRIC ACID/SODIUM CITRATE 334-500MG
30 SOLUTION, ORAL ORAL
Status: DISCONTINUED | OUTPATIENT
Start: 2021-09-30 | End: 2021-09-30 | Stop reason: HOSPADM

## 2021-09-30 RX ORDER — SODIUM CHLORIDE, SODIUM LACTATE, POTASSIUM CHLORIDE, CALCIUM CHLORIDE 600; 310; 30; 20 MG/100ML; MG/100ML; MG/100ML; MG/100ML
INJECTION, SOLUTION INTRAVENOUS CONTINUOUS
Status: DISCONTINUED | OUTPATIENT
Start: 2021-09-30 | End: 2021-09-30 | Stop reason: HOSPADM

## 2021-09-30 RX ORDER — MISOPROSTOL 200 UG/1
400 TABLET ORAL
Status: DISCONTINUED | OUTPATIENT
Start: 2021-09-30 | End: 2021-09-30 | Stop reason: HOSPADM

## 2021-09-30 RX ORDER — BISACODYL 10 MG
10 SUPPOSITORY, RECTAL RECTAL DAILY PRN
Status: DISCONTINUED | OUTPATIENT
Start: 2021-10-02 | End: 2021-10-03 | Stop reason: HOSPADM

## 2021-09-30 RX ORDER — AMOXICILLIN 250 MG
2 CAPSULE ORAL 2 TIMES DAILY
Status: DISCONTINUED | OUTPATIENT
Start: 2021-09-30 | End: 2021-10-03 | Stop reason: HOSPADM

## 2021-09-30 RX ORDER — LABETALOL 100 MG/1
100 TABLET, FILM COATED ORAL 2 TIMES DAILY
Status: DISCONTINUED | OUTPATIENT
Start: 2021-09-30 | End: 2021-10-01

## 2021-09-30 RX ORDER — TRANEXAMIC ACID 10 MG/ML
1 INJECTION, SOLUTION INTRAVENOUS EVERY 30 MIN PRN
Status: DISCONTINUED | OUTPATIENT
Start: 2021-09-30 | End: 2021-09-30 | Stop reason: HOSPADM

## 2021-09-30 RX ORDER — LANOLIN ALCOHOL/MO/W.PET/CERES
400 CREAM (GRAM) TOPICAL DAILY
Status: ON HOLD | COMMUNITY
End: 2021-09-30

## 2021-09-30 RX ORDER — ONDANSETRON 4 MG/1
4 TABLET, ORALLY DISINTEGRATING ORAL EVERY 6 HOURS PRN
Status: DISCONTINUED | OUTPATIENT
Start: 2021-09-30 | End: 2021-10-03 | Stop reason: HOSPADM

## 2021-09-30 RX ORDER — HYDROCORTISONE 2.5 %
CREAM (GRAM) TOPICAL 3 TIMES DAILY PRN
Status: DISCONTINUED | OUTPATIENT
Start: 2021-09-30 | End: 2021-10-03 | Stop reason: HOSPADM

## 2021-09-30 RX ORDER — METOCLOPRAMIDE 10 MG/1
10 TABLET ORAL EVERY 6 HOURS PRN
Status: DISCONTINUED | OUTPATIENT
Start: 2021-09-30 | End: 2021-10-03 | Stop reason: HOSPADM

## 2021-09-30 RX ORDER — AMLODIPINE BESYLATE 5 MG/1
5 TABLET ORAL 2 TIMES DAILY
Status: DISCONTINUED | OUTPATIENT
Start: 2021-09-30 | End: 2021-10-03 | Stop reason: HOSPADM

## 2021-09-30 RX ORDER — OXYCODONE HYDROCHLORIDE 5 MG/1
5 TABLET ORAL EVERY 4 HOURS PRN
Status: DISCONTINUED | OUTPATIENT
Start: 2021-09-30 | End: 2021-09-30

## 2021-09-30 RX ORDER — CITRIC ACID/SODIUM CITRATE 334-500MG
SOLUTION, ORAL ORAL
Status: DISCONTINUED
Start: 2021-09-30 | End: 2021-09-30 | Stop reason: HOSPADM

## 2021-09-30 RX ORDER — LIDOCAINE 40 MG/G
CREAM TOPICAL
Status: DISCONTINUED | OUTPATIENT
Start: 2021-09-30 | End: 2021-09-30 | Stop reason: HOSPADM

## 2021-09-30 RX ORDER — CLINDAMYCIN PHOSPHATE 900 MG/50ML
INJECTION, SOLUTION INTRAVENOUS
Status: DISCONTINUED
Start: 2021-09-30 | End: 2021-09-30 | Stop reason: HOSPADM

## 2021-09-30 RX ORDER — PROCHLORPERAZINE 25 MG
25 SUPPOSITORY, RECTAL RECTAL EVERY 12 HOURS PRN
Status: DISCONTINUED | OUTPATIENT
Start: 2021-09-30 | End: 2021-10-03 | Stop reason: HOSPADM

## 2021-09-30 RX ORDER — HYDROMORPHONE HYDROCHLORIDE 1 MG/ML
0.4 INJECTION, SOLUTION INTRAMUSCULAR; INTRAVENOUS; SUBCUTANEOUS EVERY 5 MIN PRN
Status: DISCONTINUED | OUTPATIENT
Start: 2021-09-30 | End: 2021-09-30 | Stop reason: HOSPADM

## 2021-09-30 RX ORDER — OXYTOCIN 10 [USP'U]/ML
10 INJECTION, SOLUTION INTRAMUSCULAR; INTRAVENOUS
Status: DISCONTINUED | OUTPATIENT
Start: 2021-09-30 | End: 2021-10-03 | Stop reason: HOSPADM

## 2021-09-30 RX ORDER — NALOXONE HYDROCHLORIDE 0.4 MG/ML
0.4 INJECTION, SOLUTION INTRAMUSCULAR; INTRAVENOUS; SUBCUTANEOUS
Status: DISCONTINUED | OUTPATIENT
Start: 2021-09-30 | End: 2021-10-03 | Stop reason: HOSPADM

## 2021-09-30 RX ORDER — FERROUS SULFATE 325(65) MG
325 TABLET, DELAYED RELEASE (ENTERIC COATED) ORAL DAILY
Status: ON HOLD | COMMUNITY
End: 2021-09-30

## 2021-09-30 RX ORDER — MISOPROSTOL 200 UG/1
400 TABLET ORAL
Status: DISCONTINUED | OUTPATIENT
Start: 2021-09-30 | End: 2021-10-03 | Stop reason: HOSPADM

## 2021-09-30 RX ORDER — ONDANSETRON 4 MG/1
4 TABLET, ORALLY DISINTEGRATING ORAL EVERY 30 MIN PRN
Status: DISCONTINUED | OUTPATIENT
Start: 2021-09-30 | End: 2021-09-30 | Stop reason: HOSPADM

## 2021-09-30 RX ORDER — MODIFIED LANOLIN
OINTMENT (GRAM) TOPICAL
Status: DISCONTINUED | OUTPATIENT
Start: 2021-09-30 | End: 2021-10-03 | Stop reason: HOSPADM

## 2021-09-30 RX ORDER — OXYTOCIN 10 [USP'U]/ML
10 INJECTION, SOLUTION INTRAMUSCULAR; INTRAVENOUS
Status: DISCONTINUED | OUTPATIENT
Start: 2021-09-30 | End: 2021-10-01

## 2021-09-30 RX ORDER — KETOROLAC TROMETHAMINE 30 MG/ML
30 INJECTION, SOLUTION INTRAMUSCULAR; INTRAVENOUS EVERY 6 HOURS
Status: COMPLETED | OUTPATIENT
Start: 2021-09-30 | End: 2021-10-01

## 2021-09-30 RX ORDER — PROCHLORPERAZINE MALEATE 10 MG
10 TABLET ORAL EVERY 6 HOURS PRN
Status: DISCONTINUED | OUTPATIENT
Start: 2021-09-30 | End: 2021-10-03 | Stop reason: HOSPADM

## 2021-09-30 RX ORDER — OXYTOCIN/0.9 % SODIUM CHLORIDE 30/500 ML
340 PLASTIC BAG, INJECTION (ML) INTRAVENOUS CONTINUOUS PRN
Status: DISCONTINUED | OUTPATIENT
Start: 2021-09-30 | End: 2021-09-30 | Stop reason: HOSPADM

## 2021-09-30 RX ORDER — CLINDAMYCIN PHOSPHATE 900 MG/50ML
900 INJECTION, SOLUTION INTRAVENOUS
Status: COMPLETED | OUTPATIENT
Start: 2021-09-30 | End: 2021-09-30

## 2021-09-30 RX ORDER — ACETAMINOPHEN 325 MG/1
975 TABLET ORAL EVERY 6 HOURS
Status: DISCONTINUED | OUTPATIENT
Start: 2021-09-30 | End: 2021-10-03 | Stop reason: HOSPADM

## 2021-09-30 RX ORDER — ONDANSETRON 2 MG/ML
4 INJECTION INTRAMUSCULAR; INTRAVENOUS EVERY 6 HOURS PRN
Status: DISCONTINUED | OUTPATIENT
Start: 2021-09-30 | End: 2021-09-30 | Stop reason: HOSPADM

## 2021-09-30 RX ORDER — DEXTROSE, SODIUM CHLORIDE, SODIUM LACTATE, POTASSIUM CHLORIDE, AND CALCIUM CHLORIDE 5; .6; .31; .03; .02 G/100ML; G/100ML; G/100ML; G/100ML; G/100ML
INJECTION, SOLUTION INTRAVENOUS CONTINUOUS
Status: DISCONTINUED | OUTPATIENT
Start: 2021-09-30 | End: 2021-10-03 | Stop reason: HOSPADM

## 2021-09-30 RX ORDER — CARBOPROST TROMETHAMINE 250 UG/ML
250 INJECTION, SOLUTION INTRAMUSCULAR
Status: DISCONTINUED | OUTPATIENT
Start: 2021-09-30 | End: 2021-09-30 | Stop reason: HOSPADM

## 2021-09-30 RX ORDER — MORPHINE SULFATE 1 MG/ML
INJECTION, SOLUTION EPIDURAL; INTRATHECAL; INTRAVENOUS
Status: COMPLETED | OUTPATIENT
Start: 2021-09-30 | End: 2021-09-30

## 2021-09-30 RX ORDER — OXYTOCIN/0.9 % SODIUM CHLORIDE 30/500 ML
100-340 PLASTIC BAG, INJECTION (ML) INTRAVENOUS CONTINUOUS PRN
Status: DISCONTINUED | OUTPATIENT
Start: 2021-09-30 | End: 2021-10-01

## 2021-09-30 RX ADMIN — FENTANYL CITRATE 15 MCG: 50 INJECTION, SOLUTION INTRAMUSCULAR; INTRAVENOUS at 13:23

## 2021-09-30 RX ADMIN — ONDANSETRON 4 MG: 2 INJECTION INTRAMUSCULAR; INTRAVENOUS at 13:11

## 2021-09-30 RX ADMIN — LABETALOL HYDROCHLORIDE 100 MG: 100 TABLET, FILM COATED ORAL at 20:12

## 2021-09-30 RX ADMIN — KETOROLAC TROMETHAMINE 30 MG: 30 INJECTION, SOLUTION INTRAMUSCULAR; INTRAVENOUS at 19:03

## 2021-09-30 RX ADMIN — SODIUM CHLORIDE, SODIUM LACTATE, POTASSIUM CHLORIDE, CALCIUM CHLORIDE AND DEXTROSE MONOHYDRATE: 5; 600; 310; 30; 20 INJECTION, SOLUTION INTRAVENOUS at 17:48

## 2021-09-30 RX ADMIN — ACETAMINOPHEN 975 MG: 325 TABLET, FILM COATED ORAL at 19:03

## 2021-09-30 RX ADMIN — ACETAMINOPHEN 975 MG: 325 TABLET, FILM COATED ORAL at 12:36

## 2021-09-30 RX ADMIN — PHENYLEPHRINE HYDROCHLORIDE 100 MCG: 10 INJECTION INTRAVENOUS at 13:26

## 2021-09-30 RX ADMIN — SCOPALAMINE 1 PATCH: 1 PATCH, EXTENDED RELEASE TRANSDERMAL at 18:01

## 2021-09-30 RX ADMIN — PHENYLEPHRINE HYDROCHLORIDE 100 MCG: 10 INJECTION INTRAVENOUS at 13:34

## 2021-09-30 RX ADMIN — GENTAMICIN SULFATE 100 MG: 100 INJECTION, SOLUTION INTRAVENOUS at 12:35

## 2021-09-30 RX ADMIN — SODIUM CHLORIDE, POTASSIUM CHLORIDE, SODIUM LACTATE AND CALCIUM CHLORIDE: 600; 310; 30; 20 INJECTION, SOLUTION INTRAVENOUS at 12:20

## 2021-09-30 RX ADMIN — CLINDAMYCIN PHOSPHATE 900 MG: 900 INJECTION, SOLUTION INTRAVENOUS at 13:10

## 2021-09-30 RX ADMIN — AMLODIPINE BESYLATE 5 MG: 5 TABLET ORAL at 20:12

## 2021-09-30 RX ADMIN — BUPIVACAINE HYDROCHLORIDE IN DEXTROSE 1.4 ML: 7.5 INJECTION, SOLUTION SUBARACHNOID at 13:23

## 2021-09-30 RX ADMIN — PHENYLEPHRINE HYDROCHLORIDE 0.5 MCG/KG/MIN: 10 INJECTION INTRAVENOUS at 13:25

## 2021-09-30 RX ADMIN — METOCLOPRAMIDE HYDROCHLORIDE 10 MG: 5 INJECTION INTRAMUSCULAR; INTRAVENOUS at 17:12

## 2021-09-30 RX ADMIN — Medication 340 ML/HR: at 13:37

## 2021-09-30 RX ADMIN — PHENYLEPHRINE HYDROCHLORIDE 100 MCG: 10 INJECTION INTRAVENOUS at 13:30

## 2021-09-30 RX ADMIN — MORPHINE SULFATE 0.15 MG: 1 INJECTION, SOLUTION EPIDURAL; INTRATHECAL; INTRAVENOUS at 13:23

## 2021-09-30 ASSESSMENT — ACTIVITIES OF DAILY LIVING (ADL)
FALL_HISTORY_WITHIN_LAST_SIX_MONTHS: NO
TOILETING_ISSUES: NO

## 2021-09-30 ASSESSMENT — MIFFLIN-ST. JEOR: SCORE: 1106.8

## 2021-09-30 ASSESSMENT — ENCOUNTER SYMPTOMS
DYSRHYTHMIAS: 0
SEIZURES: 0

## 2021-09-30 ASSESSMENT — LIFESTYLE VARIABLES: TOBACCO_USE: 0

## 2021-09-30 NOTE — H&P
"[facility]   LABOR ADMIT    Maegan German MRN# [id number]  Age: 41 year old YOB: 1980    Date of Admission: 2021    Primary care provider: Heide Ravi     HPI: This is a 41 year old  at 38w5d by second trimester ultrasound consistent with LMP admitted for RLTCS in setting of CHTN and FGR. She presented to care in the second trimester. She had low risk NIPS for genetic screening. Her CHTN meds were modified during her initial appointments due to persistent severe range BPs. She was found to have FGR on Level II with MFM and was followed with serial growth scans and frequent  testing. On most recent ultrasound last week, growth was at 3%ile. Dopplers have been normal. She has a history of PPROM/PTL and declined use of 17OHP injections this pregnancy. She had an elective PLTCS for her first pregnancy.     Past Medical History:  CHTN     Past Surgical History:       Social History:  This patient has no significant social history     Family History:  This patient has no significant family history     Allergies:  PCN    Physical Exam:  Ht 1.499 m (4' 11\")   Wt 52.6 kg (116 lb)   LMP 2021   BMI 23.43 kg/m    Gen: NAD  Abd: soft, NT, ND, gravid  Ext: NT, nonedematous    FHT: not currently on monitor    Prenatal Labs:   Lab Results   Component Value Date    ABO O 2019    RH Pos 2019    AS Negative 2021    HEPBANG NON-REACTIVE 10/15/2018    CHPCRT Negative 2021    GCPCRT Negative 2021    HGB 12.2 2021       GBS Status:   GBS Neg    Assessment:  This is a 41 year old  at 38w5d admitted to periop for RLTCS in setting of CHTN and FGR     Plan:  Admitted to periop  Discussed risks of  including bleeding, infection, injury to surrounding structures, injury to the baby, need for reoperation, need for blood transfusion. Consents signed for RLTCS and blood transfusion if needed.  Routine " postpartum care  Will continue [facility] meds postpartum      Heide Ravi MD  9/30/2021 [id number]

## 2021-09-30 NOTE — ANESTHESIA PROCEDURE NOTES
Intrathecal Procedure Note    Pre-Procedure   Staff -        Anesthesiologist:  John Bledsoe MD       Performed By: Anesthesiologist       Location: OR       Pre-Anesthestic Checklist: patient identified, IV checked, risks and benefits discussed, informed consent, monitors and equipment checked, pre-op evaluation and at physician/surgeon's request  Timeout:       Correct Patient: Yes        Correct Procedure: Yes        Correct Site: Yes        Correct Position: Yes   Procedure Documentation  Procedure: intrathecal       Patient Position: sitting       Patient Prep/Sterile Barriers: sterile gloves, mask, patient draped       Skin prep: Betadine       Insertion Site: L4-5. (midline approach).       Needle Gauge: 24.        Needle Length (Inches): 3.5        Spinal Needle Type: Mitali-Feng       Introducer used       Introducer: 20 G      # of attempts: 1 and  # of redirects:     Assessment/Narrative         Paresthesias: No.       CSF fluid: clear.    Medication(s) Administered   0.75% Hyperbaric Bupivacaine (Intrathecal), 1.4 mL  Morphine PF 1 mg/mL (Intrathecal), 0.15 mg  Fentanyl PF (Intrathecal), 15 mcg   Comments:  Injected 12 mg bupivacaine and 0.15 mg duramorph and 15 mcg of fentanyl

## 2021-09-30 NOTE — ANESTHESIA PREPROCEDURE EVALUATION
Anesthesia Pre-Procedure Evaluation    Patient: Maegan German   MRN: [id number] : 1980        Preoperative Diagnosis: Maternal care due to low transverse uterine scar from previous  delivery [O34.211]  Gestational hypertension, antepartum [O13.9]   Procedure : Procedure(s):  REPEAT  SECTION     Past Medical History:   Diagnosis Date     Hypertension     Chronic: on meds      Past Surgical History:   Procedure Laterality Date      SECTION N/A 3/14/2019    Procedure:  SECTION;  Surgeon: Heide Ravi MD;  Location:  L+D     HYPERTENSION MG        Allergies   Allergen Reactions     Penicillins Hives     PN:  HIVES      Social History     Tobacco Use     Smoking status: Never Smoker     Smokeless tobacco: Never Used   Substance Use Topics     Alcohol use: Not Currently      Wt Readings from Last 1 Encounters:   21 52.6 kg (116 lb)        Anesthesia Evaluation   Pt has had prior anesthetic. Type: General.    No history of anesthetic complications       ROS/MED HX  ENT/Pulmonary:    (-) tobacco use, asthma and sleep apnea   Neurologic:     (+) no peripheral neuropathy  (-) no seizures and no CVA   Cardiovascular:     (+) hypertension----- (-) CAD and arrhythmias   METS/Exercise Tolerance:     Hematologic:       Musculoskeletal:       GI/Hepatic:    (-) GERD   Renal/Genitourinary:    (-) renal disease   Endo:    (-) Type II DM and thyroid disease   Psychiatric/Substance Use:       Infectious Disease:    (-) Recent Fever   Malignancy:       Other:            Physical Exam    Airway  airway exam normal      Mallampati: II   TM distance: > 3 FB   Neck ROM: full   Mouth opening: > 3 cm    Respiratory Devices and Support         Dental  no notable dental history         Cardiovascular   cardiovascular exam normal          Pulmonary   pulmonary exam normal                OUTSIDE LABS:  CBC:   Lab Results   Component Value Date    WBC 7.1 2021    WBC 7.7  08/05/2021    HGB 12.2 09/29/2021    HGB 10.9 (L) 08/05/2021    HCT 36.8 09/29/2021    HCT 32.4 (L) 08/05/2021     09/29/2021     08/05/2021     BMP: No results found for: NA, POTASSIUM, CHLORIDE, CO2, BUN, CR, GLC  COAGS: No results found for: PTT, INR, FIBR  POC: No results found for: BGM, HCG, HCGS  HEPATIC: No results found for: ALBUMIN, PROTTOTAL, ALT, AST, GGT, ALKPHOS, BILITOTAL, BILIDIRECT, JIMBO  OTHER: No results found for: PH, LACT, A1C, MARIBELL, PHOS, MAG, LIPASE, AMYLASE, TSH, T4, T3, CRP, SED    Anesthesia Plan    ASA Status:  2   NPO Status:  NPO Appropriate    Anesthesia Type: Spinal.         Techniques and Equipment:       - Drips/Meds: Phenylephrine     Consents    Anesthesia Plan(s) and associated risks, benefits, and realistic alternatives discussed. Questions answered and patient/representative(s) expressed understanding.     - Discussed with:  Patient         Postoperative Care    Pain management: IV analgesics, Oral pain medications, intrathecal morphine.   PONV prophylaxis: Ondansetron (or other 5HT-3)     Comments:                John Bledsoe MD

## 2021-09-30 NOTE — ANESTHESIA POSTPROCEDURE EVALUATION
Patient: Maegan German    Procedure(s):  REPEAT  SECTION    Diagnosis:Maternal care due to low transverse uterine scar from previous  delivery [O34.211]  Gestational hypertension, antepartum [O13.9]  Diagnosis Additional Information: No value filed.    Anesthesia Type:  Spinal    Note:  Disposition: Admission   Postop Pain Control: Uneventful            Sign Out: Well controlled pain   PONV: No   Neuro/Psych: Uneventful            Sign Out: Acceptable/Baseline neuro status   Airway/Respiratory: Uneventful            Sign Out: Acceptable/Baseline resp. status   CV/Hemodynamics: Uneventful            Sign Out: Acceptable CV status   Other NRE: NONE   DID A NON-ROUTINE EVENT OCCUR? No           Last vitals:  Vitals Value Taken Time   /77 21 1530   Temp 36.4  C (97.5  F) 21 1445   Pulse 65 21 1542   Resp 17 21 1542   SpO2 97 % 21 1542   Vitals shown include unvalidated device data.    Electronically Signed By: John Bledsoe MD  2021  3:44 PM

## 2021-09-30 NOTE — ANESTHESIA CARE TRANSFER NOTE
Patient: Maegan German    Procedure(s):  REPEAT  SECTION    Diagnosis: Maternal care due to low transverse uterine scar from previous  delivery [O34.211]  Gestational hypertension, antepartum [O13.9]  Diagnosis Additional Information: No value filed.    Anesthesia Type:   Spinal     Note:    Oropharynx: oropharynx clear of all foreign objects and spontaneously breathing  Level of Consciousness: awake  Oxygen Supplementation: room air    Independent Airway: airway patency satisfactory and stable  Dentition: dentition unchanged  Vital Signs Stable: post-procedure vital signs reviewed and stable  Report to RN Given: handoff report given  Patient transferred to: PACU    Handoff Report: Identifed the Patient, Identified the Reponsible Provider, Reviewed the pertinent medical history, Discussed the surgical course, Reviewed Intra-OP anesthesia mangement and issues during anesthesia, Set expectations for post-procedure period and Allowed opportunity for questions and acknowledgement of understanding      Vitals:  Vitals Value Taken Time   BP     Temp     Pulse     Resp     SpO2         Electronically Signed By: JULIAN Morgan CRNA  2021  2:06 PM

## 2021-09-30 NOTE — OP NOTE
OB  Delivery Note    Maegan German MRN# [id number]   Age: 41 year old YOB: 1980     Pre-operative Diagnosis:   1. IUP at 38w5d   2. History of  section  3. Desires repeat  section  4.   5. CHTN  6. FGR    Post-operative Diagnosis: Same    Procedure done: RLTCS    Surgeon: Heide Ravi MD     Assist: Gracie Oneal CST    Anesthesia: spinal    Complications:  None    QBL: 20 mL    UOP: 50 mL     IV fluids: 700 mL    Drains: lomax catheter    Findings:  Viable female infant in vertex position weighing 4 lbs 15 oz with APGARS of 8/9  3-V cord  clear amniotic fluid  Normal-appearing placenta  Normal tubes and ovaries bilaterally.    Specimens:  none    Complications:  none      Indication and Consent:  This is a 41 year old  admitted at 38w5d for scheduled RLTCS. The risks of  section including bleeding, infection, injury to surrounding structures, injury to the baby, need for reoperation, need for blood transfusion were discussed with the patient. She expressed understanding and consented to proceed with RLTCS     Procedure Details:    The patient was brought to the operating room with IV fluids running. IV antibiotics were given for infection prophylaxis. PCBs were placed on the lower extremities and found to be working prior to onset of the case. Spinal anesthesia was administered and found to be adequate. A Lomax catheter was placed to gravity.    The patient was prepped and draped in the dorsal supine position with a leftward tilt. A timeout was performed to identify the patient and procedure.    A Pfannenstiel incision was made at the level of the prior incisional scar with the scalpel. It was carried down through the subcutaneous tissue to the fascia with the scalpel and Bovie.     The fascia was incised with the scalpel and the fascial incision was extended laterally with the Schafer scissors. The superior aspect of the fascia was grasped  with Kocher clamps and  off the underlying musculature with fingertips bluntly laterally and with Schafer scissors down the midline. The inferior aspect of the fascia was similarly grasped and dissected off the underlying musculature.  Preperitoneal fatty tissue was  off with fingertips until the peritoneum could be entered bluntly with fingertips. The peritoneal incision was extended laterally with blunt traction with careful visualization of the bladder.  The bladder blade was inserted to keep the bladder out of the operative field.     The uterus was palpated and felt to be midline. The scalpel was used to make a transverse incision in the lower uterine segment with care to avoid the bladder. The incision was entered bluntly with fingertips and extended laterally with cephalo-caudad traction.     The infant was found to be in vertex presentation. The head of the fetus was elevated to the incision. Fundal pressure was applied from above and the infant delivered without difficulty. The cord was clamped and cut after one minute and the infant was passed off to the pediatrics team. The placenta was delivered with manual traction.     The uterus was then exteriorized. The inside of the uterus was wiped with a lap sponge to ensure removal of placental membranes. The hysterotomy was closed with 0 Vicryl in a running locked fashion. Hemostasis was achieved.     The bladder blade was removed. The uterus was replaced in the pelvis. The bladder blade was replaced. Lap sponges were used to remove blood and clot from the pelvic gutters. The hysterotomy was again visualized and found to have good hemostasis. The bladder blade was removed.     The fascia was closed with running sutures of 0 Vicryl. The incision was irrigated with warm normal saline. The skin was closed in a subcuticular fashion with 4-0 Monocryl.    The patient tolerated the procedure well. All counts were correct x2. The patient and the baby  were returned to the recovery room in a stable condition.            eHide Ravi MD  9/30/2021 [id number]

## 2021-10-01 LAB — HGB BLD-MCNC: 9.8 G/DL (ref 11.7–15.7)

## 2021-10-01 PROCEDURE — 36415 COLL VENOUS BLD VENIPUNCTURE: CPT | Performed by: OBSTETRICS & GYNECOLOGY

## 2021-10-01 PROCEDURE — 250N000011 HC RX IP 250 OP 636: Performed by: OBSTETRICS & GYNECOLOGY

## 2021-10-01 PROCEDURE — 120N000012 HC R&B POSTPARTUM

## 2021-10-01 PROCEDURE — 250N000013 HC RX MED GY IP 250 OP 250 PS 637: Performed by: ANESTHESIOLOGY

## 2021-10-01 PROCEDURE — 250N000013 HC RX MED GY IP 250 OP 250 PS 637: Performed by: OBSTETRICS & GYNECOLOGY

## 2021-10-01 PROCEDURE — 85018 HEMOGLOBIN: CPT | Performed by: OBSTETRICS & GYNECOLOGY

## 2021-10-01 RX ADMIN — AMLODIPINE BESYLATE 5 MG: 5 TABLET ORAL at 21:10

## 2021-10-01 RX ADMIN — KETOROLAC TROMETHAMINE 30 MG: 30 INJECTION, SOLUTION INTRAMUSCULAR; INTRAVENOUS at 07:27

## 2021-10-01 RX ADMIN — ACETAMINOPHEN 975 MG: 325 TABLET, FILM COATED ORAL at 19:32

## 2021-10-01 RX ADMIN — OXYCODONE HYDROCHLORIDE 5 MG: 5 TABLET ORAL at 18:21

## 2021-10-01 RX ADMIN — ACETAMINOPHEN 975 MG: 325 TABLET, FILM COATED ORAL at 13:37

## 2021-10-01 RX ADMIN — SENNOSIDES AND DOCUSATE SODIUM 1 TABLET: 8.6; 5 TABLET ORAL at 19:32

## 2021-10-01 RX ADMIN — LABETALOL HYDROCHLORIDE 100 MG: 100 TABLET, FILM COATED ORAL at 12:14

## 2021-10-01 RX ADMIN — ACETAMINOPHEN 975 MG: 325 TABLET, FILM COATED ORAL at 00:55

## 2021-10-01 RX ADMIN — IBUPROFEN 800 MG: 400 TABLET, FILM COATED ORAL at 13:37

## 2021-10-01 RX ADMIN — OXYCODONE HYDROCHLORIDE 5 MG: 5 TABLET ORAL at 23:36

## 2021-10-01 RX ADMIN — OXYCODONE HYDROCHLORIDE 5 MG: 5 TABLET ORAL at 04:23

## 2021-10-01 RX ADMIN — SENNOSIDES AND DOCUSATE SODIUM 1 TABLET: 8.6; 5 TABLET ORAL at 12:15

## 2021-10-01 RX ADMIN — KETOROLAC TROMETHAMINE 30 MG: 30 INJECTION, SOLUTION INTRAMUSCULAR; INTRAVENOUS at 00:55

## 2021-10-01 RX ADMIN — IBUPROFEN 800 MG: 400 TABLET, FILM COATED ORAL at 19:32

## 2021-10-01 RX ADMIN — ACETAMINOPHEN 975 MG: 325 TABLET, FILM COATED ORAL at 07:26

## 2021-10-01 RX ADMIN — OXYCODONE HYDROCHLORIDE 5 MG: 5 TABLET ORAL at 22:27

## 2021-10-01 NOTE — PLAN OF CARE
AVSS tolerating activity well and Showered.  Bonding well with infant.  RN Spoke to Dr. Zamorano.  Discontinue Labetalol and give next dose of Norvasc at 2000.    MD aware of missed AM dose.

## 2021-10-01 NOTE — PLAN OF CARE
VSS, fundus is firm at U/1, scant flow, no clots.  Pain controlled with Duramorph, Toradol and Tylenol, abdominal binder is also in place.  Pt's nausea and emesis have improved following PRN medication, a sclopamine patch in place and caffeine.  Walker removed at 2115, awaiting void.  SBA with activity, and pt ambulated in the room this evening freely.   is very supportive.  Will continue to monitor and support.

## 2021-10-01 NOTE — PLAN OF CARE
Vital signs stable. Postpartum assessment WDL. Incision clean, dry, intact. Pain controlled with Tylenol/Toradol/Oxycodone. Patient ambulating independently. Patient passing gas. Bottle feeding on cue with no assist. IV saline locked. Patient and infant bonding well. Will continue with current plan of care.

## 2021-10-01 NOTE — PROGRESS NOTES
"Maegan German  2021    S: pt is doing well.  Tolerating po intake and pain is well controlled.  Ambulating.  Decreasing lochia. Bottle feeding with formula.     O:/69 (BP Location: Right arm)   Pulse 71   Temp 97.6  F (36.4  C)   Resp 16   Ht 1.499 m (4' 11\")   Wt 52.6 kg (116 lb)   LMP 2021   SpO2 99%   Breastfeeding Unknown   BMI 23.43 kg/m    Recent Labs   Lab 10/01/21  0747 21  1201   HGB 9.8* 12.2     Abdomen: soft, non distended, fundus firm below the umbilicus.  Incision is C/D/I  Ext: non tender, no edema or erythema    A/P:  at 38w5d s/p RLTCS POD #1     CHTN  - continue Norvasc 5 mg bid and labetalol 100 mg bid.  If pressures low, then wean labetalol..     FGR  - infant doing well, continue cares    Post op  -Doing well  -Continue care, ambulate, shower today  -Discharge planning for POD 2 or 3.     Tao Zamorano MD  "

## 2021-10-02 PROCEDURE — 250N000013 HC RX MED GY IP 250 OP 250 PS 637: Performed by: OBSTETRICS & GYNECOLOGY

## 2021-10-02 PROCEDURE — 120N000012 HC R&B POSTPARTUM

## 2021-10-02 RX ORDER — OXYCODONE HYDROCHLORIDE 5 MG/1
5-10 TABLET ORAL EVERY 4 HOURS PRN
Status: DISCONTINUED | OUTPATIENT
Start: 2021-10-02 | End: 2021-10-03 | Stop reason: HOSPADM

## 2021-10-02 RX ADMIN — SENNOSIDES AND DOCUSATE SODIUM 2 TABLET: 8.6; 5 TABLET ORAL at 08:42

## 2021-10-02 RX ADMIN — ACETAMINOPHEN 975 MG: 325 TABLET, FILM COATED ORAL at 08:42

## 2021-10-02 RX ADMIN — OXYCODONE HYDROCHLORIDE 10 MG: 5 TABLET ORAL at 03:26

## 2021-10-02 RX ADMIN — ACETAMINOPHEN 975 MG: 325 TABLET, FILM COATED ORAL at 01:30

## 2021-10-02 RX ADMIN — ACETAMINOPHEN 975 MG: 325 TABLET, FILM COATED ORAL at 20:29

## 2021-10-02 RX ADMIN — IBUPROFEN 800 MG: 400 TABLET, FILM COATED ORAL at 08:42

## 2021-10-02 RX ADMIN — IBUPROFEN 800 MG: 400 TABLET, FILM COATED ORAL at 01:30

## 2021-10-02 RX ADMIN — IBUPROFEN 800 MG: 400 TABLET, FILM COATED ORAL at 14:31

## 2021-10-02 RX ADMIN — AMLODIPINE BESYLATE 5 MG: 5 TABLET ORAL at 20:52

## 2021-10-02 RX ADMIN — OXYCODONE HYDROCHLORIDE 5 MG: 5 TABLET ORAL at 08:46

## 2021-10-02 RX ADMIN — OXYCODONE HYDROCHLORIDE 5 MG: 5 TABLET ORAL at 20:30

## 2021-10-02 RX ADMIN — AMLODIPINE BESYLATE 5 MG: 5 TABLET ORAL at 08:43

## 2021-10-02 RX ADMIN — ACETAMINOPHEN 975 MG: 325 TABLET, FILM COATED ORAL at 14:31

## 2021-10-02 RX ADMIN — OXYCODONE HYDROCHLORIDE 5 MG: 5 TABLET ORAL at 11:36

## 2021-10-02 RX ADMIN — SENNOSIDES AND DOCUSATE SODIUM 2 TABLET: 8.6; 5 TABLET ORAL at 20:30

## 2021-10-02 RX ADMIN — OXYCODONE HYDROCHLORIDE 5 MG: 5 TABLET ORAL at 14:31

## 2021-10-02 RX ADMIN — IBUPROFEN 800 MG: 400 TABLET, FILM COATED ORAL at 20:30

## 2021-10-02 NOTE — PROGRESS NOTES
"Pt rated her incisional pain a \"6\" after taking Tylenol, ibuprofen and oxycodone and she said she was unable to sleep. Dr. Joseph was called and orders were received to increase oxycodone dose. Will continue to monitor.   "

## 2021-10-02 NOTE — PLAN OF CARE
Fundus firm and bleeding wnl.  VSS.  Incision clean, dry, intact and covered with a liquid bandage.  Rates pain 3-6/10 and taking tylenol, ibuprofen and oxycodone with good relief.  Up independently.  Using abdominal binder.  Passing flatus and tolerating regular diet.  Encouraged to call with questions or concerns.

## 2021-10-02 NOTE — PROGRESS NOTES
"Maegan German  October 2, 2021    S: pt is doing well.  Tolerating po intake. Pain is slightly more than yesterday but is feeling better controlled this morning.  Ambulating.  Decreasing lochia. Bottle feeding.    O:/74   Pulse 89   Temp 98.5  F (36.9  C) (Oral)   Resp 18   Ht 1.499 m (4' 11\")   Wt 52.6 kg (116 lb)   LMP 01/02/2021   SpO2 99%   Breastfeeding Unknown   BMI 23.43 kg/m    Recent Labs   Lab 10/01/21  0747 09/29/21  1201   HGB 9.8* 12.2     Abdomen: soft, non distended, fundus firm below the umbilicus.  Incision is C/D/I  Ext: non tender, no edema or erythema    A/P: s/p RLTCS POD #2  Doing well  Continue care, work on pain control  Discharge planning for tomorrow    Tao Zamorano MD  "

## 2021-10-02 NOTE — PLAN OF CARE
Up ambulating in room and independent with Self and  cares.  Showered and tolerating activity well.    Pain controlled with Tylenol and Ibuprofen.  Took one dose of Oxycocone and states it did not really do much to decrease the pain.  this shift.

## 2021-10-02 NOTE — PLAN OF CARE
AVSS, up ambulating in room and Tylenol, Ibuprofen and 5mg Oxycodone controlling pain. Tender on the right side of her incision.  Dr. Zamorano here this AM and aware of right side incisional pain.  Passing Flatus, good bowel sounds.    Scopolamine patch removed this AM.  Bonding well with infant.  Plan for discharge tomorrow.

## 2021-10-03 VITALS
WEIGHT: 116 LBS | HEART RATE: 92 BPM | RESPIRATION RATE: 16 BRPM | DIASTOLIC BLOOD PRESSURE: 82 MMHG | SYSTOLIC BLOOD PRESSURE: 138 MMHG | OXYGEN SATURATION: 99 % | HEIGHT: 59 IN | TEMPERATURE: 97.8 F | BODY MASS INDEX: 23.39 KG/M2

## 2021-10-03 PROBLEM — I10 CHRONIC HYPERTENSION: Status: ACTIVE | Noted: 2021-10-03

## 2021-10-03 PROCEDURE — 250N000013 HC RX MED GY IP 250 OP 250 PS 637: Performed by: OBSTETRICS & GYNECOLOGY

## 2021-10-03 RX ORDER — OXYCODONE HYDROCHLORIDE 5 MG/1
5-10 TABLET ORAL EVERY 4 HOURS PRN
Qty: 25 TABLET | Refills: 0 | Status: ON HOLD | OUTPATIENT
Start: 2021-10-03 | End: 2023-12-11

## 2021-10-03 RX ORDER — AMOXICILLIN 250 MG
1 CAPSULE ORAL 2 TIMES DAILY PRN
Qty: 60 TABLET | Refills: 0 | Status: ON HOLD | OUTPATIENT
Start: 2021-10-03 | End: 2023-12-11

## 2021-10-03 RX ORDER — ACETAMINOPHEN 325 MG/1
975 TABLET ORAL EVERY 6 HOURS
Qty: 60 TABLET | Refills: 0 | Status: ON HOLD | OUTPATIENT
Start: 2021-10-03 | End: 2023-12-11

## 2021-10-03 RX ORDER — IBUPROFEN 800 MG/1
800 TABLET, FILM COATED ORAL EVERY 6 HOURS PRN
Qty: 60 TABLET | Refills: 0 | Status: ON HOLD | OUTPATIENT
Start: 2021-10-03 | End: 2023-12-11

## 2021-10-03 RX ADMIN — AMLODIPINE BESYLATE 5 MG: 5 TABLET ORAL at 09:38

## 2021-10-03 RX ADMIN — ACETAMINOPHEN 975 MG: 325 TABLET, FILM COATED ORAL at 02:16

## 2021-10-03 RX ADMIN — SENNOSIDES AND DOCUSATE SODIUM 1 TABLET: 8.6; 5 TABLET ORAL at 08:30

## 2021-10-03 RX ADMIN — OXYCODONE HYDROCHLORIDE 10 MG: 5 TABLET ORAL at 00:44

## 2021-10-03 RX ADMIN — IBUPROFEN 800 MG: 400 TABLET, FILM COATED ORAL at 08:31

## 2021-10-03 RX ADMIN — OXYCODONE HYDROCHLORIDE 10 MG: 5 TABLET ORAL at 09:37

## 2021-10-03 RX ADMIN — ACETAMINOPHEN 975 MG: 325 TABLET, FILM COATED ORAL at 08:31

## 2021-10-03 RX ADMIN — OXYCODONE HYDROCHLORIDE 10 MG: 5 TABLET ORAL at 05:09

## 2021-10-03 RX ADMIN — IBUPROFEN 800 MG: 400 TABLET, FILM COATED ORAL at 02:16

## 2021-10-03 NOTE — PROGRESS NOTES
"OB Progress Note    A/P:  Pt is a 41 year old  POD#3 s/p RLTCS at 38w5d     1. Continue routine post-op and post-partum care.  2. Bottle feeding. Discussed breast care.   3. CHTN: labetalol stopped yesterday. BP WNL. Continue norvasc. Follow-up in 1-2 weeks in clinic.   4. Acute blood loss anemia: VSS. Iron on discharge.   5. Rubella immune.  Rh pos, rhogam is not indicated  6. Will plan for discharge home today. Discharge instructions and precautions reviewed.     Full Code    S:  Pt feeling well. Pain adequately controlled. Denies CP/SOB/N/V/HA. Ambulating and urinating without difficulty and tolerating PO. Lochia light. Pt is passing flatus. Breastfeeding.     O: /87 (BP Location: Right arm)   Pulse 95   Temp 98.3  F (36.8  C) (Oral)   Resp 16   Ht 1.499 m (4' 11\")   Wt 52.6 kg (116 lb)   LMP 2021   SpO2 99%   Breastfeeding Unknown   BMI 23.43 kg/m      GEN: A/Ox3, NAD  CV: regular rate & rhythm without murmurs, rubs or gallops  Lungs: clear to auscultation bilaterally without wheezes, rales or rhonchi  Abd: Appropriately tender, + BS, incision clean/dry/intact, uterus firm, below umbilicus  LE: No swelling or pain    Lab Results   Component Value Date    WBC 7.1 2021    HGB 9.8 (L) 10/01/2021    HCT 36.8 2021    MCV 85 2021     2021         Nida Mcmanus MD  [phone]  10/03/21 10:43 AM    "

## 2021-10-03 NOTE — PLAN OF CARE
Independent with infant and self cares.  Ready to discharge to home today.  Vital signs stable, bleeding minimal

## 2021-10-03 NOTE — PLAN OF CARE
Discharged to home ambulatory with belongings. Understands discharge instructions, teach back done regarding follow up appointment for blood pressures, and post partum

## 2021-10-03 NOTE — DISCHARGE SUMMARY
[facility] Discharge Summary    Maegan German MRN# [id number]   Age: 41 year old YOB: 1980     Date of Admission:  2021  Date of Discharge::  10/3/2021  2:08 PM   Admitting Physician:  Heide Ravi MD  Discharge Physician:  Nida Mcmanus MD     Home clinic: [facility]          Admission Diagnoses:   IUP at 38w5d   History of prior  section  CHTN  History of PTL on 17OHP  FGR            Discharge Diagnosis:   Same as above plus  S/p RLTCS at 38w5d   Acute blood loss anemia           Procedures:   Procedure(s): Repeat low transverse  section       No other procedures performed during this admission           Medications Prior to Admission:     Medications Prior to Admission   Medication Sig Dispense Refill Last Dose     amLODIPine (NORVASC) 5 MG tablet Take 5 mg by mouth 2 times daily    2021 at 0800     [DISCONTINUED] labetalol (NORMODYNE) 100 MG tablet Take 100 mg by mouth 2 times daily   2021 at 0800             Discharge Medications:     Current Discharge Medication List      START taking these medications    Details   acetaminophen (TYLENOL) 325 MG tablet Take 3 tablets (975 mg) by mouth every 6 hours  Qty: 60 tablet, Refills: 0    Associated Diagnoses: S/P  section      ibuprofen (ADVIL/MOTRIN) 800 MG tablet Take 1 tablet (800 mg) by mouth every 6 hours as needed for moderate pain or pain  Qty: 60 tablet, Refills: 0    Associated Diagnoses: S/P  section      oxyCODONE (ROXICODONE) 5 MG tablet Take 1-2 tablets (5-10 mg) by mouth every 4 hours as needed for moderate to severe pain  Qty: 25 tablet, Refills: 0    Associated Diagnoses: S/P  section      senna-docusate (SENOKOT-S/PERICOLACE) 8.6-50 MG tablet Take 1 tablet by mouth 2 times daily as needed for constipation  Qty: 60 tablet, Refills: 0    Associated Diagnoses: S/P  section         CONTINUE these medications which have NOT CHANGED    Details    amLODIPine (NORVASC) 5 MG tablet Take 5 mg by mouth 2 times daily          STOP taking these medications       labetalol (NORMODYNE) 100 MG tablet Comments:   Reason for Stopping:                     Consultations:   No consultations were requested during this admission          Brief History of Labor:   This is a 41 year old  at 38w5d by second trimester ultrasound consistent with LMP admitted for RLTCS in setting of CHTN and FGR. She presented to care in the second trimester. She had low risk NIPS for genetic screening. Her CHTN meds were modified during her initial appointments due to persistent severe range BPs. She was found to have FGR on Level II with MFM and was followed with serial growth scans and frequent  testing. On most recent ultrasound last week, growth was at 3%ile. Dopplers have been normal. She has a history of PPROM/PTL and declined use of 17OHP injections this pregnancy. She had an elective PLTCS for her first pregnancy.               Hospital Course:   The patient's hospital course was unremarkable.  She recovered as anticipated and experienced no post-operative complications. She had acute blood loss anemia - post op hgb 9.8 from 12.2.  On discharge, her pain was well controlled. Vaginal bleeding is similar to peak menstrual flow.  Voiding without difficulty.  Ambulating well and tolerating a normal diet.  No fever or significant wound drainage.  Infant is bottle feeding well.  Infant is stable.  No bowel movement yet.  She was discharged on post-partum day #3.    In regards to CHTN: her labetalol was stopped, continued on Norvasc.     Post-partum hemoglobin:   Hemoglobin   Date Value Ref Range Status   10/01/2021 9.8 (L) 11.7 - 15.7 g/dL Final   03/15/2019 9.6 (L) 11.7 - 15.7 g/dL Final             Discharge Instructions and Follow-Up:   Discharge diet: Regular   Discharge activity: Lifting restricted to 15 pounds  No lifting, driving, or strenuous exercise for 6  week(s)  Pelvic rest: abstain from intercourse and do not use tampons for 6 week(s)   Discharge follow-up: Follow up with Slava Cohen in 2 and 6 weeks   Wound care: Keep wound clean and dry           Discharge Disposition:   Discharged to home      Attestation:  Amount of time performed on this discharge summary: 10   minutes.    Nida Mcmanus MD

## 2021-10-03 NOTE — PLAN OF CARE
Fundus firm and bleeding wnl.  VSS.  Incision clean, dry, intact and covered with a liquid bandage.  Rates pain 6/10 and taking tylenol, ibuprofen and oxycodone with relief.  Up independently.  Using abdominal binder.  Passing flatus and tolerating regular diet.  Encouraged to call with questions or concerns.

## 2022-02-19 ENCOUNTER — HEALTH MAINTENANCE LETTER (OUTPATIENT)
Age: 42
End: 2022-02-19

## 2022-10-22 ENCOUNTER — HEALTH MAINTENANCE LETTER (OUTPATIENT)
Age: 42
End: 2022-10-22

## 2023-04-01 ENCOUNTER — HEALTH MAINTENANCE LETTER (OUTPATIENT)
Age: 43
End: 2023-04-01

## 2023-07-05 ENCOUNTER — MEDICAL CORRESPONDENCE (OUTPATIENT)
Dept: HEALTH INFORMATION MANAGEMENT | Facility: CLINIC | Age: 43
End: 2023-07-05
Payer: OTHER GOVERNMENT

## 2023-07-05 ENCOUNTER — TRANSCRIBE ORDERS (OUTPATIENT)
Dept: MATERNAL FETAL MEDICINE | Facility: CLINIC | Age: 43
End: 2023-07-05
Payer: OTHER GOVERNMENT

## 2023-07-05 DIAGNOSIS — O26.90 PREGNANCY RELATED CONDITION, ANTEPARTUM: Primary | ICD-10-CM

## 2023-08-02 ENCOUNTER — TRANSCRIBE ORDERS (OUTPATIENT)
Dept: MATERNAL FETAL MEDICINE | Facility: CLINIC | Age: 43
End: 2023-08-02
Payer: OTHER GOVERNMENT

## 2023-08-02 ENCOUNTER — MEDICAL CORRESPONDENCE (OUTPATIENT)
Dept: HEALTH INFORMATION MANAGEMENT | Facility: CLINIC | Age: 43
End: 2023-08-02
Payer: OTHER GOVERNMENT

## 2023-08-02 DIAGNOSIS — O26.90 PREGNANCY RELATED CONDITION, ANTEPARTUM: Primary | ICD-10-CM

## 2023-08-21 ENCOUNTER — TRANSFERRED RECORDS (OUTPATIENT)
Dept: HEALTH INFORMATION MANAGEMENT | Facility: CLINIC | Age: 43
End: 2023-08-21
Payer: OTHER GOVERNMENT

## 2023-08-31 ENCOUNTER — PRE VISIT (OUTPATIENT)
Dept: MATERNAL FETAL MEDICINE | Facility: CLINIC | Age: 43
End: 2023-08-31
Payer: OTHER GOVERNMENT

## 2023-08-31 ENCOUNTER — TRANSFERRED RECORDS (OUTPATIENT)
Dept: HEALTH INFORMATION MANAGEMENT | Facility: CLINIC | Age: 43
End: 2023-08-31
Payer: OTHER GOVERNMENT

## 2023-09-07 ENCOUNTER — HOSPITAL ENCOUNTER (OUTPATIENT)
Dept: ULTRASOUND IMAGING | Facility: CLINIC | Age: 43
Discharge: HOME | End: 2023-09-07
Attending: OBSTETRICS & GYNECOLOGY
Payer: OTHER GOVERNMENT

## 2023-09-07 ENCOUNTER — OFFICE VISIT (OUTPATIENT)
Dept: MATERNAL FETAL MEDICINE | Facility: CLINIC | Age: 43
End: 2023-09-07
Attending: OBSTETRICS & GYNECOLOGY
Payer: OTHER GOVERNMENT

## 2023-09-07 DIAGNOSIS — O09.892 HISTORY OF PRETERM DELIVERY, CURRENTLY PREGNANT IN SECOND TRIMESTER: ICD-10-CM

## 2023-09-07 DIAGNOSIS — O09.522 MULTIGRAVIDA OF ADVANCED MATERNAL AGE IN SECOND TRIMESTER: Primary | ICD-10-CM

## 2023-09-07 DIAGNOSIS — O10.919 CHRONIC HYPERTENSION AFFECTING PREGNANCY: ICD-10-CM

## 2023-09-07 DIAGNOSIS — O26.90 PREGNANCY RELATED CONDITION, ANTEPARTUM: ICD-10-CM

## 2023-09-07 PROCEDURE — 76811 OB US DETAILED SNGL FETUS: CPT

## 2023-09-07 PROCEDURE — 76811 OB US DETAILED SNGL FETUS: CPT | Mod: 26 | Performed by: OBSTETRICS & GYNECOLOGY

## 2023-09-07 PROCEDURE — 76817 TRANSVAGINAL US OBSTETRIC: CPT | Mod: 26 | Performed by: OBSTETRICS & GYNECOLOGY

## 2023-09-07 NOTE — PROGRESS NOTES
"Please see \"imaging\" tab under \"Chart Review\" for details of today's US at the [facility].    Aaron Mclain MD  Maternal-Fetal Medicine    "

## 2023-09-07 NOTE — NURSING NOTE
Patient presents to [facility] for L2 at 19w2d due to AMA, CHTN. Denies LOF, vaginal bleeding, cramping/contractions. SBAR given to KYLE MD, see their note in Epic.

## 2023-12-07 ENCOUNTER — HOSPITAL ENCOUNTER (OUTPATIENT)
Facility: CLINIC | Age: 43
Discharge: HOME | End: 2023-12-07
Attending: OBSTETRICS & GYNECOLOGY | Admitting: OBSTETRICS & GYNECOLOGY
Payer: OTHER GOVERNMENT

## 2023-12-07 VITALS — DIASTOLIC BLOOD PRESSURE: 81 MMHG | TEMPERATURE: 98.7 F | SYSTOLIC BLOOD PRESSURE: 135 MMHG | RESPIRATION RATE: 17 BRPM

## 2023-12-07 PROBLEM — Z36.89 ENCOUNTER FOR TRIAGE IN PREGNANT PATIENT: Status: ACTIVE | Noted: 2023-12-07

## 2023-12-07 LAB
ABO/RH(D): NORMAL
ALBUMIN MFR UR ELPH: <6 MG/DL
ALBUMIN SERPL BCG-MCNC: 3.8 G/DL (ref 3.5–5.2)
ALP SERPL-CCNC: 183 U/L (ref 40–150)
ALT SERPL W P-5'-P-CCNC: 49 U/L (ref 0–50)
ANION GAP SERPL CALCULATED.3IONS-SCNC: 13 MMOL/L (ref 7–15)
ANTIBODY SCREEN: NEGATIVE
AST SERPL W P-5'-P-CCNC: 35 U/L (ref 0–45)
BILIRUB SERPL-MCNC: <0.2 MG/DL
BUN SERPL-MCNC: 7.4 MG/DL (ref 6–20)
CALCIUM SERPL-MCNC: 9.3 MG/DL (ref 8.6–10)
CHLORIDE SERPL-SCNC: 100 MMOL/L (ref 98–107)
CREAT SERPL-MCNC: 0.45 MG/DL (ref 0.51–0.95)
CREAT UR-MCNC: 5.8 MG/DL
DEPRECATED HCO3 PLAS-SCNC: 26 MMOL/L (ref 22–29)
EGFRCR SERPLBLD CKD-EPI 2021: >90 ML/MIN/1.73M2
ERYTHROCYTE [DISTWIDTH] IN BLOOD BY AUTOMATED COUNT: 13.1 % (ref 10–15)
GLUCOSE SERPL-MCNC: 82 MG/DL (ref 70–99)
HCT VFR BLD AUTO: 33.3 % (ref 35–47)
HGB BLD-MCNC: 11.3 G/DL (ref 11.7–15.7)
HOLD SPECIMEN: NORMAL
MCH RBC QN AUTO: 28 PG (ref 26.5–33)
MCHC RBC AUTO-ENTMCNC: 33.9 G/DL (ref 31.5–36.5)
MCV RBC AUTO: 82 FL (ref 78–100)
PLATELET # BLD AUTO: 267 10E3/UL (ref 150–450)
POTASSIUM SERPL-SCNC: 2.9 MMOL/L (ref 3.4–5.3)
PROT SERPL-MCNC: 7.6 G/DL (ref 6.4–8.3)
PROT/CREAT 24H UR: NORMAL MG/G{CREAT}
RBC # BLD AUTO: 4.04 10E6/UL (ref 3.8–5.2)
SODIUM SERPL-SCNC: 139 MMOL/L (ref 135–145)
SPECIMEN EXPIRATION DATE: NORMAL
WBC # BLD AUTO: 9.3 10E3/UL (ref 4–11)

## 2023-12-07 PROCEDURE — G0463 HOSPITAL OUTPT CLINIC VISIT: HCPCS | Mod: 25

## 2023-12-07 PROCEDURE — 85027 COMPLETE CBC AUTOMATED: CPT | Performed by: OBSTETRICS & GYNECOLOGY

## 2023-12-07 PROCEDURE — 86901 BLOOD TYPING SEROLOGIC RH(D): CPT | Performed by: OBSTETRICS & GYNECOLOGY

## 2023-12-07 PROCEDURE — 86850 RBC ANTIBODY SCREEN: CPT | Performed by: OBSTETRICS & GYNECOLOGY

## 2023-12-07 PROCEDURE — 36415 COLL VENOUS BLD VENIPUNCTURE: CPT | Performed by: OBSTETRICS & GYNECOLOGY

## 2023-12-07 PROCEDURE — 59025 FETAL NON-STRESS TEST: CPT

## 2023-12-07 PROCEDURE — 80053 COMPREHEN METABOLIC PANEL: CPT | Performed by: OBSTETRICS & GYNECOLOGY

## 2023-12-07 PROCEDURE — 84156 ASSAY OF PROTEIN URINE: CPT | Performed by: OBSTETRICS & GYNECOLOGY

## 2023-12-07 RX ORDER — METOCLOPRAMIDE HYDROCHLORIDE 5 MG/ML
10 INJECTION INTRAMUSCULAR; INTRAVENOUS EVERY 6 HOURS PRN
Status: DISCONTINUED | OUTPATIENT
Start: 2023-12-07 | End: 2023-12-07 | Stop reason: HOSPADM

## 2023-12-07 RX ORDER — ASPIRIN 81 MG/1
81 TABLET ORAL DAILY
Status: ON HOLD | COMMUNITY
End: 2024-01-11

## 2023-12-07 RX ORDER — ONDANSETRON 2 MG/ML
4 INJECTION INTRAMUSCULAR; INTRAVENOUS EVERY 6 HOURS PRN
Status: DISCONTINUED | OUTPATIENT
Start: 2023-12-07 | End: 2023-12-07 | Stop reason: HOSPADM

## 2023-12-07 RX ORDER — VITAMIN A ACETATE, .BETA.-CAROTENE, ASCORBIC ACID, CHOLECALCIFEROL, .ALPHA.-TOCOPHEROL ACETATE, DL-, THIAMINE MONONITRATE, RIBOFLAVIN, NIACINAMIDE, PYRIDOXINE HYDROCHLORIDE, FOLIC ACID, CYANOCOBALAMIN, CALCIUM CARBONATE, FERROUS FUMARATE, ZINC OXIDE, AND CUPRIC OXIDE 2000; 2000; 120; 400; 22; 1.84; 3; 20; 10; 1; 12; 200; 27; 25; 2 [IU]/1; [IU]/1; MG/1; [IU]/1; MG/1; MG/1; MG/1; MG/1; MG/1; MG/1; UG/1; MG/1; MG/1; MG/1; MG/1
1 TABLET ORAL DAILY
COMMUNITY

## 2023-12-07 RX ORDER — METOCLOPRAMIDE 10 MG/1
10 TABLET ORAL EVERY 6 HOURS PRN
Status: DISCONTINUED | OUTPATIENT
Start: 2023-12-07 | End: 2023-12-07 | Stop reason: HOSPADM

## 2023-12-07 RX ORDER — PROCHLORPERAZINE MALEATE 5 MG
10 TABLET ORAL EVERY 6 HOURS PRN
Status: DISCONTINUED | OUTPATIENT
Start: 2023-12-07 | End: 2023-12-07 | Stop reason: HOSPADM

## 2023-12-07 RX ORDER — PROCHLORPERAZINE 25 MG
25 SUPPOSITORY, RECTAL RECTAL EVERY 12 HOURS PRN
Status: DISCONTINUED | OUTPATIENT
Start: 2023-12-07 | End: 2023-12-07 | Stop reason: HOSPADM

## 2023-12-07 RX ORDER — ONDANSETRON 4 MG/1
4 TABLET, ORALLY DISINTEGRATING ORAL EVERY 6 HOURS PRN
Status: DISCONTINUED | OUTPATIENT
Start: 2023-12-07 | End: 2023-12-07 | Stop reason: HOSPADM

## 2023-12-07 ASSESSMENT — COLUMBIA-SUICIDE SEVERITY RATING SCALE - C-SSRS
2. HAVE YOU ACTUALLY HAD ANY THOUGHTS OF KILLING YOURSELF IN THE PAST MONTH?: NO
6. HAVE YOU EVER DONE ANYTHING, STARTED TO DO ANYTHING, OR PREPARED TO DO ANYTHING TO END YOUR LIFE?: NO
1. IN THE PAST MONTH, HAVE YOU WISHED YOU WERE DEAD OR WISHED YOU COULD GO TO SLEEP AND NOT WAKE UP?: NO

## 2023-12-07 ASSESSMENT — ACTIVITIES OF DAILY LIVING (ADL): ADLS_ACUITY_SCORE: 18

## 2023-12-07 NOTE — PLAN OF CARE
Data: [facility] presented to [facility]: 2023 11:25 AM.  Reason for maternal/fetal assessment is  BP check, rule out pre eclampsia. Patient arrived directly from clinic per Dr Ravi . Patient reports she had some severe range blood pressures at home throughout the week, and had a mildly elevated blood pressure in clinic today. Patient denies uterine contractions, leaking of vaginal fluid/rupture of membranes, vaginal bleeding, abdominal pain, pelvic pressure, nausea, vomiting, headache, visual disturbances, epigastric or RUQ pain, significant edema. Patient reports fetal movement is normal. Patient is a 32w2d .  Prenatal record reviewed. Pregnancy has been complicated by chronic hypertension .    Vital signs  mildly elevated . Support person is not present.     Action: Verbal consent for EFM. Triage assessment completed.     Response: Patient verbalized agreement with plan. Orders received from Dr Ravi, for labs, urine, and NST.    Awaiting lab results.

## 2023-12-07 NOTE — PLAN OF CARE
High bp of 153/101 noted right after Dr Ravi left patient's room. Dr Ravi notified and stated if BP returns to 130s-140's/90s, patient may still discharge with follow up on Monday. If BP remain high (150's), then call MD.

## 2023-12-07 NOTE — PLAN OF CARE
BP returned to patient's baseline. OK to discharge per Dr Ravi.    Presumed adequate fetal oxygenation documented. Discharge instructions reviewed. Patient instructed to report change in fetal movement, vaginal leaking of fluid or bleeding, abdominal pain, or any concerns related to the pregnancy to provider/clinic.      Patient verbalized understanding of education and agreement with plan.    Pt left ambulatory at 1444.

## 2023-12-07 NOTE — DISCHARGE INSTRUCTIONS
Discharge Instruction for Undelivered Patients      You were seen for:  rule out pre eclampsia  We Consulted: Dr Ravi  You had (Test or Medicine):NST (non stress test), labs     Diet:   Drink 8 to 12 glasses of liquids (milk, juice, water) every day.  You may eat meals and snacks.     Activity:  Count fetal kicks everyday (see handout)  Call your doctor or nurse midwife if your baby is moving less than usual.     Call your provider if you notice:  Swelling in your face or increased swelling in your hands or legs.  Headaches that are not relieved by Tylenol (acetaminophen).  Changes in your vision (blurring: seeing spots or stars.)  Nausea (sick to your stomach) and vomiting (throwing up).   Weight gain of 5 pounds or more per week.  Heartburn that doesn't go away.  Signs of bladder infection: pain when you urinate (use the toilet), need to go more often and more urgently.  The bag of coppola (rupture of membranes) breaks, or you notice leaking in your underwear.  Bright red blood in your underwear.  Abdominal (lower belly) or stomach pain.  Second (plus) baby: Contractions (tightening) less than 10 minutes apart and getting stronger.  *If less than 34 weeks: Contractions (tightening) more than 6 times in one hour.  Increase or change in vaginal discharge (note the color and amount)    Follow-up:  On Monday, clinic will call to set up appt

## 2023-12-11 ENCOUNTER — HOSPITAL ENCOUNTER (OUTPATIENT)
Facility: CLINIC | Age: 43
Setting detail: OBSERVATION
Discharge: HOME | End: 2023-12-12
Attending: OBSTETRICS & GYNECOLOGY | Admitting: STUDENT IN AN ORGANIZED HEALTH CARE EDUCATION/TRAINING PROGRAM
Payer: OTHER GOVERNMENT

## 2023-12-11 DIAGNOSIS — I10 CHRONIC HYPERTENSION: Primary | ICD-10-CM

## 2023-12-11 LAB
ABO/RH(D): NORMAL
ALBUMIN SERPL BCG-MCNC: 3.6 G/DL (ref 3.5–5.2)
ALBUMIN UR-MCNC: NEGATIVE MG/DL
ALP SERPL-CCNC: 164 U/L (ref 40–150)
ALT SERPL W P-5'-P-CCNC: 35 U/L (ref 0–50)
ANION GAP SERPL CALCULATED.3IONS-SCNC: 14 MMOL/L (ref 7–15)
ANTIBODY SCREEN: NEGATIVE
AST SERPL W P-5'-P-CCNC: 25 U/L (ref 0–45)
BILIRUB SERPL-MCNC: <0.2 MG/DL
BUN SERPL-MCNC: 6.9 MG/DL (ref 6–20)
CALCIUM SERPL-MCNC: 9.1 MG/DL (ref 8.6–10)
CHLORIDE SERPL-SCNC: 102 MMOL/L (ref 98–107)
CREAT SERPL-MCNC: 0.44 MG/DL (ref 0.51–0.95)
DEPRECATED HCO3 PLAS-SCNC: 23 MMOL/L (ref 22–29)
EGFRCR SERPLBLD CKD-EPI 2021: >90 ML/MIN/1.73M2
ERYTHROCYTE [DISTWIDTH] IN BLOOD BY AUTOMATED COUNT: 13.5 % (ref 10–15)
GLUCOSE BLDC GLUCOMTR-MCNC: 157 MG/DL (ref 70–99)
GLUCOSE BLDC GLUCOMTR-MCNC: 161 MG/DL (ref 70–99)
GLUCOSE SERPL-MCNC: 108 MG/DL (ref 70–99)
HCT VFR BLD AUTO: 33.6 % (ref 35–47)
HGB BLD-MCNC: 11.1 G/DL (ref 11.7–15.7)
MCH RBC QN AUTO: 27.7 PG (ref 26.5–33)
MCHC RBC AUTO-ENTMCNC: 33 G/DL (ref 31.5–36.5)
MCV RBC AUTO: 84 FL (ref 78–100)
PLATELET # BLD AUTO: 255 10E3/UL (ref 150–450)
POTASSIUM SERPL-SCNC: 2.9 MMOL/L (ref 3.4–5.3)
PROT SERPL-MCNC: 7.3 G/DL (ref 6.4–8.3)
RBC # BLD AUTO: 4.01 10E6/UL (ref 3.8–5.2)
SODIUM SERPL-SCNC: 139 MMOL/L (ref 135–145)
SPECIMEN EXPIRATION DATE: NORMAL
WBC # BLD AUTO: 7.9 10E3/UL (ref 4–11)

## 2023-12-11 PROCEDURE — 86850 RBC ANTIBODY SCREEN: CPT | Performed by: OBSTETRICS & GYNECOLOGY

## 2023-12-11 PROCEDURE — 85027 COMPLETE CBC AUTOMATED: CPT | Performed by: OBSTETRICS & GYNECOLOGY

## 2023-12-11 PROCEDURE — 80053 COMPREHEN METABOLIC PANEL: CPT | Performed by: OBSTETRICS & GYNECOLOGY

## 2023-12-11 PROCEDURE — 81003 URINALYSIS AUTO W/O SCOPE: CPT | Performed by: OBSTETRICS & GYNECOLOGY

## 2023-12-11 PROCEDURE — 120N000001 HC R&B MED SURG/OB

## 2023-12-11 PROCEDURE — 250N000013 HC RX MED GY IP 250 OP 250 PS 637: Performed by: OBSTETRICS & GYNECOLOGY

## 2023-12-11 PROCEDURE — 36415 COLL VENOUS BLD VENIPUNCTURE: CPT | Performed by: OBSTETRICS & GYNECOLOGY

## 2023-12-11 PROCEDURE — 250N000011 HC RX IP 250 OP 636: Performed by: OBSTETRICS & GYNECOLOGY

## 2023-12-11 PROCEDURE — 96372 THER/PROPH/DIAG INJ SC/IM: CPT | Performed by: OBSTETRICS & GYNECOLOGY

## 2023-12-11 PROCEDURE — 86901 BLOOD TYPING SEROLOGIC RH(D): CPT | Performed by: OBSTETRICS & GYNECOLOGY

## 2023-12-11 RX ORDER — ZOLPIDEM TARTRATE 5 MG/1
5 TABLET ORAL
Status: DISCONTINUED | OUTPATIENT
Start: 2023-12-11 | End: 2023-12-12 | Stop reason: HOSPADM

## 2023-12-11 RX ORDER — LIDOCAINE 40 MG/G
CREAM TOPICAL
Status: DISCONTINUED | OUTPATIENT
Start: 2023-12-11 | End: 2023-12-12 | Stop reason: HOSPADM

## 2023-12-11 RX ORDER — FUROSEMIDE 40 MG/5ML
5 SOLUTION ORAL DAILY PRN
Status: ON HOLD | COMMUNITY
End: 2023-12-21

## 2023-12-11 RX ORDER — AMLODIPINE BESYLATE 10 MG/1
10 TABLET ORAL 2 TIMES DAILY
Status: DISCONTINUED | OUTPATIENT
Start: 2023-12-11 | End: 2023-12-11

## 2023-12-11 RX ORDER — ACETAMINOPHEN 325 MG/1
650 TABLET ORAL EVERY 4 HOURS PRN
Status: DISCONTINUED | OUTPATIENT
Start: 2023-12-11 | End: 2023-12-12 | Stop reason: HOSPADM

## 2023-12-11 RX ORDER — LANOLIN ALCOHOL/MO/W.PET/CERES
400 CREAM (GRAM) TOPICAL DAILY
COMMUNITY

## 2023-12-11 RX ORDER — PRENATAL VIT/IRON FUM/FOLIC AC 27MG-0.8MG
1 TABLET ORAL DAILY
Status: DISCONTINUED | OUTPATIENT
Start: 2023-12-11 | End: 2023-12-12 | Stop reason: HOSPADM

## 2023-12-11 RX ORDER — HYDRALAZINE HYDROCHLORIDE 20 MG/ML
10 INJECTION INTRAMUSCULAR; INTRAVENOUS
Status: DISCONTINUED | OUTPATIENT
Start: 2023-12-11 | End: 2023-12-12 | Stop reason: HOSPADM

## 2023-12-11 RX ORDER — BETAMETHASONE SODIUM PHOSPHATE AND BETAMETHASONE ACETATE 3; 3 MG/ML; MG/ML
12 INJECTION, SUSPENSION INTRA-ARTICULAR; INTRALESIONAL; INTRAMUSCULAR; SOFT TISSUE EVERY 24 HOURS
Qty: 4 ML | Refills: 0 | Status: COMPLETED | OUTPATIENT
Start: 2023-12-11 | End: 2023-12-12

## 2023-12-11 RX ORDER — NICOTINE POLACRILEX 4 MG
15-30 LOZENGE BUCCAL
Status: DISCONTINUED | OUTPATIENT
Start: 2023-12-11 | End: 2023-12-12 | Stop reason: HOSPADM

## 2023-12-11 RX ORDER — DEXTROSE MONOHYDRATE 25 G/50ML
25-50 INJECTION, SOLUTION INTRAVENOUS
Status: DISCONTINUED | OUTPATIENT
Start: 2023-12-11 | End: 2023-12-12 | Stop reason: HOSPADM

## 2023-12-11 RX ORDER — LABETALOL HYDROCHLORIDE 5 MG/ML
20-80 INJECTION, SOLUTION INTRAVENOUS EVERY 10 MIN PRN
Status: DISCONTINUED | OUTPATIENT
Start: 2023-12-11 | End: 2023-12-12 | Stop reason: HOSPADM

## 2023-12-11 RX ORDER — AMLODIPINE BESYLATE 10 MG/1
10 TABLET ORAL DAILY
Status: DISCONTINUED | OUTPATIENT
Start: 2023-12-12 | End: 2023-12-12 | Stop reason: HOSPADM

## 2023-12-11 RX ADMIN — ZOLPIDEM TARTRATE 5 MG: 5 TABLET ORAL at 22:01

## 2023-12-11 RX ADMIN — ACETAMINOPHEN 650 MG: 325 TABLET, FILM COATED ORAL at 20:02

## 2023-12-11 RX ADMIN — BETAMETHASONE ACETATE AND BETAMETHASONE SODIUM PHOSPHATE 12 MG: 3; 3 INJECTION, SUSPENSION INTRA-ARTICULAR; INTRALESIONAL; INTRAMUSCULAR; SOFT TISSUE at 11:00

## 2023-12-11 ASSESSMENT — ACTIVITIES OF DAILY LIVING (ADL)
ADLS_ACUITY_SCORE: 18
ADLS_ACUITY_SCORE: 35
ADLS_ACUITY_SCORE: 18

## 2023-12-11 NOTE — CONSULTS
Neonatology Advanced Practice Antepartum Counseling Consult      I was asked to provide antepartum counseling for Maegan German at the request of Heide Ravi MD secondary to potential early delivery related to elevated blood pressures. Ms. German is currently 32w6d and has a hx significant for gestational hypertension. Betamethasone was administered today and will be given again tomorrow ( and ). Ms. German was counseled on the expected hospital course, potential risks, and outcomes associated with an infant born at approximately 34-36 weeks gestation. The counseling included: initial delivery room stabilization, respiratory course, lung development, retinopathy of prematurity, infection (including NEC), nutrition, growth and development. Please feel free to call with any additional questions or concerns.      JULIAN Brown-CNP, NNP 2023 4:21 PM   Advanced Practice Service    Intensive Care Unit  [facility]      Floor Time (min): 20

## 2023-12-11 NOTE — PLAN OF CARE
Goal Outcome Evaluation:    Pt admitted to room 211. Oriented to room and unit. POC discussed. Pt agrees.

## 2023-12-11 NOTE — PLAN OF CARE
Discussed placing a saline lock with pt. Pt requests to not have an IV placed unless her BP increases to a point that she needs IV medication.

## 2023-12-11 NOTE — H&P
"[facility]   LABOR ADMIT    Maegan German MRN# [id number]  Age: 43 year old YOB: 1980    Date of Admission: 2023    Primary care provider: Heide Ravi     HPI: This is a 43 year old  at 32w6d admitted for observation and continued evaluation of elevated blood pressure. She has CHTN which historically prior to this pregnancy had been difficult to manage. SBPs outside of pregnancy in the past up to 200s. She is generally managed with amlodipine which she has continued during this pregnancy with great control of blood pressure until last week. Blood pressures have now been primarily in mild range with 3-4 severe range pressures noted at home over the past few weeks. She notes occasional dull HA and blurry vision, overall SOB is worsening. She was sent to the [facility] last Thursday with normal labs and BPs aside from one low mild range.   She had low risk NIPS this pregnancy - XY aware.  She was diagnosed with diet-controlled GDM in the third trimester - good control of blood sugars.         Obstetrical History:  G1 early SAB  G2 PPROM/PTL at 36+5, elective PLTCS  G3 followed by MFM for FGR, RLTCS at 38 weeks due to CHTN and FGR      Past Medical History:  CHTN     Past Surgical History:   x 2     Social History:  This patient has no significant social history     Family History:  This patient has no significant family history     Allergies:  PCN    Physical Exam:  BP (!) 150/80   Resp 16   LMP 2023   Gen: NAD  Abd: soft, NT, ND, gravid  Ext: NT, nonedematous    BPP 8/10 in office today (non-reactive NST_    Prenatal Labs:   Lab Results   Component Value Date    ABO O 2019    RH Pos 2019    AS Negative 2023    HEPBANG NON-REACTIVE 10/15/2018    CHPCRT Negative 2021    GCPCRT Negative 2021    HGB 11.1 (L) 2023       GBS Status:   No results found for: \"GBS\"    Assessment:  This is a 43 year old  at 32w6d " admitted to antepartum due to elevated blood pressures - r/o superimposed preeclampsia     Plan:  Admit to antepartum  CMP, CBC, and 24 hour urine collection  Monitor BPs - continue home amlodipine (taken this morning at 0730 so not due until tomorrow) and determine potential need for additional oral anti-hypertensive (will not start at this time in order to ace a better idea of current BP control)  BMZ today and 12/12  NNP consult to discuss potential expectations  Blood sugars fasting and postprandial - expect transient elevation after BMZ  Fetal monitoring BID and PRN    Reviewed potential diagnoses and subsequent management strategies.      Heide Ravi MD  12/11/2023 1157    I am off this afternoon - Dr. Mcmanus is available at [facility] location and Dr. Garcia is on call after 5:00 pm.

## 2023-12-11 NOTE — PLAN OF CARE
Goal Outcome Evaluation:    BP's stable since admission. No need for treatment. Pt denies s/s of pre-eclampsia currently but does report she gets occasional mild headaches and visual disturbances. First dose of beta administered. Monitoring post prandial BG. Pt refused SL. Independent with cares in room.

## 2023-12-11 NOTE — PROVIDER NOTIFICATION
Dr Ravi notified via phone of pt's arrival, BP on arrival. Orders clarified for Norvasc-pt takes daily. OK to monitor fhr and toco this evening as pt just had an NST and BPP in clinic this am.

## 2023-12-12 VITALS
TEMPERATURE: 97.9 F | WEIGHT: 120 LBS | OXYGEN SATURATION: 98 % | DIASTOLIC BLOOD PRESSURE: 80 MMHG | HEART RATE: 121 BPM | SYSTOLIC BLOOD PRESSURE: 135 MMHG | RESPIRATION RATE: 16 BRPM | BODY MASS INDEX: 24.24 KG/M2

## 2023-12-12 LAB
GLUCOSE BLDC GLUCOMTR-MCNC: 118 MG/DL (ref 70–99)
GLUCOSE BLDC GLUCOMTR-MCNC: 161 MG/DL (ref 70–99)
GLUCOSE BLDC GLUCOMTR-MCNC: 191 MG/DL (ref 70–99)

## 2023-12-12 PROCEDURE — 250N000013 HC RX MED GY IP 250 OP 250 PS 637: Performed by: OBSTETRICS & GYNECOLOGY

## 2023-12-12 PROCEDURE — 250N000011 HC RX IP 250 OP 636: Performed by: OBSTETRICS & GYNECOLOGY

## 2023-12-12 PROCEDURE — 250N000013 HC RX MED GY IP 250 OP 250 PS 637: Performed by: STUDENT IN AN ORGANIZED HEALTH CARE EDUCATION/TRAINING PROGRAM

## 2023-12-12 PROCEDURE — 96372 THER/PROPH/DIAG INJ SC/IM: CPT | Performed by: OBSTETRICS & GYNECOLOGY

## 2023-12-12 PROCEDURE — G0378 HOSPITAL OBSERVATION PER HR: HCPCS

## 2023-12-12 PROCEDURE — 82962 GLUCOSE BLOOD TEST: CPT

## 2023-12-12 RX ORDER — LABETALOL 100 MG/1
100 TABLET, FILM COATED ORAL EVERY 12 HOURS SCHEDULED
Status: DISCONTINUED | OUTPATIENT
Start: 2023-12-12 | End: 2023-12-12 | Stop reason: HOSPADM

## 2023-12-12 RX ORDER — LABETALOL 100 MG/1
100 TABLET, FILM COATED ORAL 2 TIMES DAILY
Qty: 60 TABLET | Refills: 0 | Status: ON HOLD | OUTPATIENT
Start: 2023-12-12 | End: 2023-12-23

## 2023-12-12 RX ADMIN — AMLODIPINE BESYLATE 10 MG: 10 TABLET ORAL at 08:20

## 2023-12-12 RX ADMIN — ACETAMINOPHEN 650 MG: 325 TABLET, FILM COATED ORAL at 11:41

## 2023-12-12 RX ADMIN — BETAMETHASONE ACETATE AND BETAMETHASONE SODIUM PHOSPHATE 12 MG: 3; 3 INJECTION, SUSPENSION INTRA-ARTICULAR; INTRALESIONAL; INTRAMUSCULAR; SOFT TISSUE at 11:34

## 2023-12-12 RX ADMIN — LABETALOL HYDROCHLORIDE 100 MG: 100 TABLET, FILM COATED ORAL at 10:10

## 2023-12-12 ASSESSMENT — ACTIVITIES OF DAILY LIVING (ADL)
ADLS_ACUITY_SCORE: 18

## 2023-12-12 NOTE — DISCHARGE INSTRUCTIONS
Discharge Instruction for Undelivered Patients      You were seen for:  Hypertension  We Consulted: Dr Ibanez  You had (Test or Medicine):Monitoring, labwork     Diet:   Drink 8 to 12 glasses of liquids (milk, juice, water) every day.  You may eat meals and snacks.  To manage your diabetes, follow the guidelines for eating and drinking given to you by your Clinic Provider or Diabetes Educator.       Activity:  Stay on bed rest or partial bed rest. This means Try only to be up for meals or a shower  Count fetal kicks everyday (see handout)  Call your doctor or nurse midwife if your baby is moving less than usual.     Call your provider if you notice:  Swelling in your face or increased swelling in your hands or legs.  Headaches that are not relieved by Tylenol (acetaminophen).  Changes in your vision (blurring: seeing spots or stars.)  Nausea (sick to your stomach) and vomiting (throwing up).   Weight gain of 5 pounds or more per week.  Heartburn that doesn't go away.  Signs of bladder infection: pain when you urinate (use the toilet), need to go more often and more urgently.  The bag of coppola (rupture of membranes) breaks, or you notice leaking in your underwear.  Bright red blood in your underwear.  Abdominal (lower belly) or stomach pain.  For first baby: Contractions (tightening) less than 5 minutes apart for one hour or more.  Second (plus) baby: Contractions (tightening) less than 10 minutes apart and getting stronger.  *If less than 34 weeks: Contractions (tightening) more than 6 times in one hour.  Increase or change in vaginal discharge (note the color and amount)  Other:     Follow-up:  As scheduled in the clinic on this Friday 12.15.23

## 2023-12-12 NOTE — PLAN OF CARE
1 hour post prandial blood sugar after supper was 161. Patient stated that she had been drinking soda pop with dinner.

## 2023-12-12 NOTE — PROGRESS NOTES
Selam took a nap. She is feeling better. Dr Ibanez called in and was given report. The plan is to discharge patient to home. Discharge instructions were reviewed at length. Selam understands to call the MD if she notices further signs of hypertension. Will follow up in clinic this Friday 12.15.23.

## 2023-12-12 NOTE — PROVIDER NOTIFICATION
12/11/23 2113   Provider Notification   Provider Name/Title Radha PAT   Method of Notification Phone   Request Evaluate - Remote   Notification Reason   (FHR tracing review)       Reviewed FHR tracing over the phone with Dr Garcia, as patient appeared to have a non connected decel, down to 70's at 2030. RN was at bedside adjusting monitors during this time and patient was in left lateral position.  Monitors readjusted and FHR for 45 minutes after questionable decel had moderate variability and several accels.  Dr Garcia OK with pt coming off EFM.  Plan for NST tomorrow morning, and if patient has any new complaints overnight, may put EFM on to monitor baby.

## 2023-12-12 NOTE — PROGRESS NOTES
OB Antepartum Progress Note:    S: Selam is feeling well this morning. She took and ambien last night and slept well. She currently does not have a headache. Continues to have a little shortness of breath. Feels her legs are less swollen than when she is at home and she is overall feeling better than she did at home. Denies CP, palpitations, dizziness, lightheadedness, RUQ/epigsatric pain.    O:  /73   Pulse (!) 121   Temp 97.9  F (36.6  C)   Resp 16   Wt 54.4 kg (120 lb)   LMP 2023   SpO2 98%   BMI 24.24 kg/m     General: No acute distress  Heart: tachycardia, rhythm  Lungs: on room air, no accessory muscles  Abdomen: gravid, nontender  LE: no edema    Reactive NST this morning.    A/P:  This is a 43 year old  at 33w0d admitted to antepartum due to elevated blood pressures - r/o superimposed preeclampsia     Chronic hypertension - does not currently meet criteria for superimposed PreE  CMP, CBC wnl; urine protein negative  Monitor BPs - continue home amlodipine 10mg daily, Bps mostly mild range during admission so labetalol 100mg BID started  BMZ completed  and   S/p NICU consultation  Blood sugars fasting and postprandial - expect transient elevation after BMZ  Fetal monitoring BID and PRN     Dispo: possible discharge to home this afternoon pending BP control and continued improved symptoms    Jeri Ibanez MD  OBGYN, [facility]  23 11:34 AM     Addendum:  Pt doing well. She was able to walk the halls this afternoon, continues to feel well. Plan for discharge to home with additional medication of labetalol 100mg BID.  Reactive fetal testing this afternoon.  Follow up in clinic on Thursday/Friday of this week for short term follow up of blood pressures.    Jeri Ibanez MD   23 3:51 PM

## 2023-12-12 NOTE — PROGRESS NOTES
Selam put her call light on and is stating she feels slightly light headed and also has a bit of a headache. She is concerned it could be due to the Labetalol. BP at this time 124/75. Dr Ibanez stopped by. Will assess patient until 4 o'clock and will reassess after that.

## 2023-12-12 NOTE — PLAN OF CARE
"Bps WNL from [id number]. Pt denies preeclampsia symptoms. Morning . Adequate urine output. BMZ #1 given at 1100 12/11.     Selam progressing towards all care plan goals.  Problem: Adult Inpatient Plan of Care  Goal: Plan of Care Review  Description: The Plan of Care Review/Shift note should be completed every shift.  The Outcome Evaluation is a brief statement about your assessment that the patient is improving, declining, or no change.  This information will be displayed automatically on your shift  note.  Outcome: Progressing  Goal: Patient-Specific Goal (Individualized)  Description: You can add care plan individualizations to a care plan. Examples of Individualization might be:  \"Parent requests to be called daily at 9am for status\", \"I have a hard time hearing out of my right ear\", or \"Do not touch me to wake me up as it startles  me\".  Outcome: Progressing  Goal: Absence of Hospital-Acquired Illness or Injury  Outcome: Progressing  Intervention: Identify and Manage Fall Risk  Recent Flowsheet Documentation  Taken 12/12/2023 0400 by Neeta Otero RN  Safety Promotion/Fall Prevention: clutter free environment maintained  Goal: Optimal Comfort and Wellbeing  Outcome: Progressing  Goal: Readiness for Transition of Care  Outcome: Progressing     Problem: Hypertensive Disorders in Pregnancy  Goal: Patient-Fetal Stabilization  Outcome: Progressing   Goal Outcome Evaluation:                        "

## 2023-12-12 NOTE — UTILIZATION REVIEW
"    Admission Status; Secondary Review Determination         Under the authority of the Utilization Management Committee, the utilization review process indicated a secondary review on the above patient.  The review outcome is based on review of the medical records, discussions with staff, and applying clinical experience noted on the date of the review.          (x) Observation Status Appropriate - This patient does not meet hospital inpatient criteria and is placed in observation status. If this patient's primary payer is Medicare and was admitted as an inpatient, Condition Code 44 should be used and patient status changed to \"observation\".     RATIONALE FOR DETERMINATION   3-year-old pregnant woman at 33 weeks of gestation was admitted for elevated blood pressures to rule out superimposed preeclampsia. She has chronic hypertension but does not currently meet the criteria for superimposed preeclampsia, with normal CMP and CBC values and negative urine protein. Blood pressure monitoring and management included continuing home amlodipine, initiating labetalol, and completing blood pressure measurements. She received BMZ and NICU consultation and had transiently elevated blood sugars following BMZ administration. Fetal monitoring was conducted regularly to assess the well-being of the fetus. Overall, the patient's condition appears stable, and discharge is a possibility later today.  The severity of illness, intensity of service provided, expected LOS and risk for adverse outcome make the care appropriate for further observation; however, doesn't meet criteria for hospital inpatient admission. Dr Ibanez notified of this determination.    This document was produced using voice recognition software.      The information on this document is developed by the utilization review team in order for the business office to ensure compliance.  This only denotes the appropriateness of proper admission status and does not " reflect the quality of care rendered.         The definitions of Inpatient Status and Observation Status used in making the determination above are those provided in the CMS Coverage Manual, Chapter 1 and Chapter 6, section 70.4.      Sincerely,     CHRIS MOSLEY MD    System Medical Director  Utilization Management  [facility].

## 2023-12-21 ENCOUNTER — HOSPITAL ENCOUNTER (INPATIENT)
Facility: CLINIC | Age: 43
LOS: 2 days | Discharge: HOME | DRG: 833 | End: 2023-12-23
Attending: OBSTETRICS & GYNECOLOGY | Admitting: OBSTETRICS & GYNECOLOGY
Payer: OTHER GOVERNMENT

## 2023-12-21 DIAGNOSIS — I10 CHRONIC HYPERTENSION: Primary | ICD-10-CM

## 2023-12-21 LAB
ABO/RH(D): NORMAL
ALBUMIN MFR UR ELPH: <6 MG/DL
ALBUMIN SERPL BCG-MCNC: 3.5 G/DL (ref 3.5–5.2)
ALBUMIN UR-MCNC: NEGATIVE MG/DL
ALP SERPL-CCNC: 151 U/L (ref 40–150)
ALT SERPL W P-5'-P-CCNC: 22 U/L (ref 0–50)
ANION GAP SERPL CALCULATED.3IONS-SCNC: 10 MMOL/L (ref 7–15)
ANTIBODY SCREEN: NEGATIVE
APPEARANCE UR: CLEAR
AST SERPL W P-5'-P-CCNC: 18 U/L (ref 0–45)
BASOPHILS # BLD AUTO: 0 10E3/UL (ref 0–0.2)
BASOPHILS NFR BLD AUTO: 0 %
BILIRUB SERPL-MCNC: 0.2 MG/DL
BILIRUB UR QL STRIP: NEGATIVE
BUN SERPL-MCNC: 9.5 MG/DL (ref 6–20)
CALCIUM SERPL-MCNC: 9.3 MG/DL (ref 8.6–10)
CHLORIDE SERPL-SCNC: 100 MMOL/L (ref 98–107)
COLOR UR AUTO: NORMAL
CREAT SERPL-MCNC: 0.4 MG/DL (ref 0.51–0.95)
CREAT UR-MCNC: 7.1 MG/DL
DEPRECATED HCO3 PLAS-SCNC: 27 MMOL/L (ref 22–29)
EGFRCR SERPLBLD CKD-EPI 2021: >90 ML/MIN/1.73M2
EOSINOPHIL # BLD AUTO: 0.1 10E3/UL (ref 0–0.7)
EOSINOPHIL NFR BLD AUTO: 2 %
ERYTHROCYTE [DISTWIDTH] IN BLOOD BY AUTOMATED COUNT: 14.5 % (ref 10–15)
GLUCOSE BLDC GLUCOMTR-MCNC: 103 MG/DL (ref 70–99)
GLUCOSE BLDC GLUCOMTR-MCNC: 104 MG/DL (ref 70–99)
GLUCOSE BLDC GLUCOMTR-MCNC: 127 MG/DL (ref 70–99)
GLUCOSE SERPL-MCNC: 122 MG/DL (ref 70–99)
GLUCOSE UR STRIP-MCNC: NEGATIVE MG/DL
HCT VFR BLD AUTO: 31.3 % (ref 35–47)
HGB BLD-MCNC: 10.3 G/DL (ref 11.7–15.7)
HGB UR QL STRIP: NEGATIVE
IMM GRANULOCYTES # BLD: 0.1 10E3/UL
IMM GRANULOCYTES NFR BLD: 2 %
KETONES UR STRIP-MCNC: NEGATIVE MG/DL
LEUKOCYTE ESTERASE UR QL STRIP: NEGATIVE
LYMPHOCYTES # BLD AUTO: 2.2 10E3/UL (ref 0.8–5.3)
LYMPHOCYTES NFR BLD AUTO: 29 %
MCH RBC QN AUTO: 27.8 PG (ref 26.5–33)
MCHC RBC AUTO-ENTMCNC: 32.9 G/DL (ref 31.5–36.5)
MCV RBC AUTO: 85 FL (ref 78–100)
MONOCYTES # BLD AUTO: 0.5 10E3/UL (ref 0–1.3)
MONOCYTES NFR BLD AUTO: 7 %
NEUTROPHILS # BLD AUTO: 4.8 10E3/UL (ref 1.6–8.3)
NEUTROPHILS NFR BLD AUTO: 60 %
NITRATE UR QL: NEGATIVE
NRBC # BLD AUTO: 0 10E3/UL
NRBC BLD AUTO-RTO: 0 /100
PH UR STRIP: 7 [PH] (ref 5–7)
PLATELET # BLD AUTO: 210 10E3/UL (ref 150–450)
POTASSIUM SERPL-SCNC: 3.1 MMOL/L (ref 3.4–5.3)
PROT SERPL-MCNC: 7 G/DL (ref 6.4–8.3)
PROT/CREAT 24H UR: NORMAL MG/G{CREAT}
RBC # BLD AUTO: 3.7 10E6/UL (ref 3.8–5.2)
RBC URINE: 1 /HPF
SODIUM SERPL-SCNC: 137 MMOL/L (ref 135–145)
SP GR UR STRIP: 1 (ref 1–1.03)
SPECIMEN EXPIRATION DATE: NORMAL
SQUAMOUS EPITHELIAL: <1 /HPF
UROBILINOGEN UR STRIP-MCNC: NORMAL MG/DL
WBC # BLD AUTO: 7.7 10E3/UL (ref 4–11)
WBC URINE: 1 /HPF

## 2023-12-21 PROCEDURE — 85049 AUTOMATED PLATELET COUNT: CPT | Performed by: OBSTETRICS & GYNECOLOGY

## 2023-12-21 PROCEDURE — 36415 COLL VENOUS BLD VENIPUNCTURE: CPT | Performed by: OBSTETRICS & GYNECOLOGY

## 2023-12-21 PROCEDURE — 86850 RBC ANTIBODY SCREEN: CPT | Performed by: OBSTETRICS & GYNECOLOGY

## 2023-12-21 PROCEDURE — 250N000013 HC RX MED GY IP 250 OP 250 PS 637: Performed by: OBSTETRICS & GYNECOLOGY

## 2023-12-21 PROCEDURE — 81001 URINALYSIS AUTO W/SCOPE: CPT | Performed by: OBSTETRICS & GYNECOLOGY

## 2023-12-21 PROCEDURE — 120N000001 HC R&B MED SURG/OB

## 2023-12-21 PROCEDURE — 84156 ASSAY OF PROTEIN URINE: CPT | Performed by: OBSTETRICS & GYNECOLOGY

## 2023-12-21 PROCEDURE — 86901 BLOOD TYPING SEROLOGIC RH(D): CPT | Performed by: OBSTETRICS & GYNECOLOGY

## 2023-12-21 PROCEDURE — 80053 COMPREHEN METABOLIC PANEL: CPT | Performed by: OBSTETRICS & GYNECOLOGY

## 2023-12-21 RX ORDER — PRENATAL VIT/IRON FUM/FOLIC AC 27MG-0.8MG
1 TABLET ORAL DAILY
Status: DISCONTINUED | OUTPATIENT
Start: 2023-12-22 | End: 2023-12-23 | Stop reason: HOSPADM

## 2023-12-21 RX ORDER — METOCLOPRAMIDE HYDROCHLORIDE 5 MG/ML
10 INJECTION INTRAMUSCULAR; INTRAVENOUS EVERY 6 HOURS PRN
Status: DISCONTINUED | OUTPATIENT
Start: 2023-12-21 | End: 2023-12-23 | Stop reason: HOSPADM

## 2023-12-21 RX ORDER — ACETAMINOPHEN 325 MG/1
650 TABLET ORAL EVERY 4 HOURS PRN
Status: DISCONTINUED | OUTPATIENT
Start: 2023-12-21 | End: 2023-12-23 | Stop reason: HOSPADM

## 2023-12-21 RX ORDER — ZOLPIDEM TARTRATE 5 MG/1
5 TABLET ORAL
Status: ON HOLD | COMMUNITY
End: 2023-12-23

## 2023-12-21 RX ORDER — ONDANSETRON 4 MG/1
4 TABLET, ORALLY DISINTEGRATING ORAL EVERY 6 HOURS PRN
Status: DISCONTINUED | OUTPATIENT
Start: 2023-12-21 | End: 2023-12-23 | Stop reason: HOSPADM

## 2023-12-21 RX ORDER — LANOLIN ALCOHOL/MO/W.PET/CERES
400 CREAM (GRAM) TOPICAL DAILY
Status: DISCONTINUED | OUTPATIENT
Start: 2023-12-22 | End: 2023-12-23 | Stop reason: HOSPADM

## 2023-12-21 RX ORDER — PROCHLORPERAZINE MALEATE 5 MG
10 TABLET ORAL EVERY 6 HOURS PRN
Status: DISCONTINUED | OUTPATIENT
Start: 2023-12-21 | End: 2023-12-23 | Stop reason: HOSPADM

## 2023-12-21 RX ORDER — ACETAMINOPHEN 500 MG
1000 TABLET ORAL EVERY 6 HOURS PRN
Status: ON HOLD | COMMUNITY
End: 2023-12-23

## 2023-12-21 RX ORDER — DEXTROSE MONOHYDRATE 25 G/50ML
25-50 INJECTION, SOLUTION INTRAVENOUS
Status: DISCONTINUED | OUTPATIENT
Start: 2023-12-21 | End: 2023-12-23 | Stop reason: HOSPADM

## 2023-12-21 RX ORDER — PROCHLORPERAZINE 25 MG
25 SUPPOSITORY, RECTAL RECTAL EVERY 12 HOURS PRN
Status: DISCONTINUED | OUTPATIENT
Start: 2023-12-21 | End: 2023-12-23 | Stop reason: HOSPADM

## 2023-12-21 RX ORDER — METOCLOPRAMIDE 10 MG/1
10 TABLET ORAL EVERY 6 HOURS PRN
Status: DISCONTINUED | OUTPATIENT
Start: 2023-12-21 | End: 2023-12-23 | Stop reason: HOSPADM

## 2023-12-21 RX ORDER — AMLODIPINE BESYLATE 10 MG/1
10 TABLET ORAL DAILY
Status: DISCONTINUED | OUTPATIENT
Start: 2023-12-22 | End: 2023-12-23 | Stop reason: HOSPADM

## 2023-12-21 RX ORDER — FUROSEMIDE 20 MG
20 TABLET ORAL DAILY
Status: ON HOLD | COMMUNITY
End: 2023-12-23

## 2023-12-21 RX ORDER — ONDANSETRON 2 MG/ML
4 INJECTION INTRAMUSCULAR; INTRAVENOUS EVERY 6 HOURS PRN
Status: DISCONTINUED | OUTPATIENT
Start: 2023-12-21 | End: 2023-12-23 | Stop reason: HOSPADM

## 2023-12-21 RX ORDER — ZOLPIDEM TARTRATE 5 MG/1
5 TABLET ORAL
Status: DISCONTINUED | OUTPATIENT
Start: 2023-12-21 | End: 2023-12-23 | Stop reason: HOSPADM

## 2023-12-21 RX ORDER — NICOTINE POLACRILEX 4 MG
15-30 LOZENGE BUCCAL
Status: DISCONTINUED | OUTPATIENT
Start: 2023-12-21 | End: 2023-12-23 | Stop reason: HOSPADM

## 2023-12-21 RX ADMIN — ZOLPIDEM TARTRATE 5 MG: 5 TABLET ORAL at 23:02

## 2023-12-21 RX ADMIN — LABETALOL HYDROCHLORIDE 300 MG: 200 TABLET, FILM COATED ORAL at 19:25

## 2023-12-21 ASSESSMENT — ACTIVITIES OF DAILY LIVING (ADL)
ADLS_ACUITY_SCORE: 35
ADLS_ACUITY_SCORE: 18
ADLS_ACUITY_SCORE: 35
ADLS_ACUITY_SCORE: 18

## 2023-12-21 NOTE — H&P
[facility]   LABOR ADMIT    Maegan German MRN# [id number]  Age: 43 year old YOB: 1980    Date of Admission: 2023    Primary care provider: Heide Ravi     HPI: This is a 43 year old  at 34w2d admitted to antepartum due to poorly controlled CHTN. She continued on her pre-pregnancy dose of amlodipine 10 mg daily during pregnancy given her CHTN has historically been difficult to control before pregnancy and in previous pregnancies. Her BP had been very well controlled until about 32 weeks. She ultimately was admitted to the hospital for observation and with superimposed preeclampsia ruled out was initiated on labetalol 100 mg BID 2023. She received BMZ during that admission  and . The labetalol was increased to 200 mg BID a few days later upon office follow-up. Today, she presented to an office appointment and her BP was 160/90. She reports a similar value at home this morning and all pressures otherwise mildly elevated since last appointment earlier this week. She additionally reports a headache this morning. No vision changes, CP, SOB, RUQ/epigastric pain. No ctx, LOF, VB. +FM.   Otherwise in this pregnancy, she had low risk NIPS for genetic screening - XY aware, she is managing GDM with diet, and EFW by ultrasound was 4#1 (27%ile) at 32 weeks. Fetus is breech and she has planned RLTCS regardless.     Obstetrical History:  G1 early SAB  G2 PPROM/PTL at 36+5, elective PLTCS  G3 followed by MFM for FGR, RLTCS at 38 weeks due to CHTN and FGR       Past Medical History:  CHTN     Past Surgical History:   x 2     Social History:  No significant history     Family History:  This patient has no significant family history     Allergies:  PCN    Physical Exam:  LMP 2023   Gen: NAD  Abd: soft, NT, ND, gravid  Ext: NT, nonedematous    Modified BPP 4/4 in office today    Prenatal Labs:   Lab Results   Component Value Date    ABO O  "2019    RH Pos 2019    AS Negative 2023    HEPBANG NON-REACTIVE 10/15/2018    CHPCRT Negative 2021    GCPCRT Negative 2021    HGB 11.1 (L) 2023       GBS Status:   No results found for: \"GBS\"    Assessment:  This is a 43 year old  at 34w2d admitted to antepartum due to poorly controlled CHTN     Plan:  Admit to antepartum  Continue amlodipine 10 mg daily. Increase labetalol to 300 mg BID  Preeclampsia labs wnl - no protein on UA, urine P:C pending  Received BMZ -  Continuous monitoring for now given severe range pressure in office - will switch to qshift if all stable and BPs controlled  If prolonged hospitalization, needs at least weekly  testing and follow-up growth ultrasound at 35-36 weeks  Blood sugar checks fasting and postprandial  For RLTCS for delivery    Heide Ravi MD  2023 1039  "

## 2023-12-21 NOTE — PROGRESS NOTES
Patient notes mild edema in her ankles, noted as trace, and a slight headache that started this morning.  She doesn't want Tylenol.  She denies epigastric pain, SOB, or visual disturbances.  Her BPs are elevated, not severe range.  Dr. Ravi updated with all BPs; will continue to update her.  BP med dose adjusted to start this evening.  Pt aware of POC and in agreement.  +FM, denies contractions.

## 2023-12-22 LAB
GLUCOSE BLDC GLUCOMTR-MCNC: 121 MG/DL (ref 70–99)
GLUCOSE BLDC GLUCOMTR-MCNC: 91 MG/DL (ref 70–99)
GLUCOSE BLDC GLUCOMTR-MCNC: 94 MG/DL (ref 70–99)
GLUCOSE BLDC GLUCOMTR-MCNC: 96 MG/DL (ref 70–99)

## 2023-12-22 PROCEDURE — 250N000013 HC RX MED GY IP 250 OP 250 PS 637: Performed by: OBSTETRICS & GYNECOLOGY

## 2023-12-22 PROCEDURE — 120N000001 HC R&B MED SURG/OB

## 2023-12-22 RX ADMIN — AMLODIPINE BESYLATE 10 MG: 10 TABLET ORAL at 08:35

## 2023-12-22 RX ADMIN — LABETALOL HYDROCHLORIDE 300 MG: 200 TABLET, FILM COATED ORAL at 08:33

## 2023-12-22 RX ADMIN — ZOLPIDEM TARTRATE 5 MG: 5 TABLET ORAL at 21:43

## 2023-12-22 RX ADMIN — FOLIC ACID TAB 400 MCG 400 MCG: 400 TAB at 08:35

## 2023-12-22 RX ADMIN — PRENATAL VITAMINS-IRON FUMARATE 27 MG IRON-FOLIC ACID 0.8 MG TABLET 1 TABLET: at 08:35

## 2023-12-22 RX ADMIN — LABETALOL HYDROCHLORIDE 300 MG: 200 TABLET, FILM COATED ORAL at 19:52

## 2023-12-22 RX ADMIN — Medication 140 MG: at 08:35

## 2023-12-22 RX ADMIN — ACETAMINOPHEN 650 MG: 325 TABLET, FILM COATED ORAL at 23:24

## 2023-12-22 ASSESSMENT — ACTIVITIES OF DAILY LIVING (ADL)
ADLS_ACUITY_SCORE: 18

## 2023-12-22 NOTE — PROVIDER NOTIFICATION
12/22/23 0900   Provider Notification   Provider Name/Title Dr. Bunch   Method of Notification At Bedside   Request Evaluate in Person   Notification Reason Status Update

## 2023-12-22 NOTE — PROGRESS NOTES
Antepartum Daily Progress Note    Patient name: Maegan German  : 1980  Admit date: 2023  MRN: [id number]  Acct: [id number]      Assessment/Plan:  Maegan German is a  at 34w3d who admitted for poorly controlled cHTN. Today is HD#2.    cHTN:  - Severe-range BP prior to admission at home and in office, prompting admission  - BP since admission normotensive to mild-range, no further severe-range BPs. BPs since 1930 yesterday have all been normotensive and pt remains asx  - PreE labs at admission all WNL (serum and urine), not SI preE at present  - Cont Amlodipine 10mg PO qDay and Labetalol 300mg PO BID, last increased at admission on . D/w pt will follow BP trends today and anticipate if noting BPs rising close to due for next dose, will likely change Labetalol to TID dosing (and also discussed increased renal clearance arjun in 3rd tri) vs if rising mid-dose, will likely increase Labetalol dose to 400mg next. Pt aware and in agreement with plan.  - Received BMZ course during last admission for same issue (, ). No need to repeat course at this time  - Continuous EFM reactive, transition to TID  - Encourage ambulation/activity today to ensure BPs remain controlled w/ new dose, arjun during daytime today (since normotensive overnight but minimal activity)  - Dispo: likely monitor BPs throughout the day today and discharge home tomorrow if remains stable on new dose. Want to be conservative arjun considering this is already has had multiple admissions for this issue.    GDM, A1:  - BGs remain controlled w/ diet since admission  - Cont fasting and PP checks per routine  - Anticipate surge in BGs if requires repeat course of BMZ but for now no indication for that    34w GA pregnancy:  - Breech on last check and for RLTCS anyway  - Will need growth check in the next 1-2w and will need qWeek BPP if needing prolonged inpt stay (though hopeful for discharge home over this  weekend)  - Cont PNV, folate, iron  - Monitoring as above  - Admission UA WNL  - If pt with persistent severe-range BPs, transitions to SI preE w/ SF, arjun being 34+ wks now, would likely be for delivery. Otherwise, likely delivery around 37w for cHTN req meds and worsening control.      Subjective:  This AM, without any new complaints. She denies contractions, denies LOF, and denies VB. She reports normal fetal movement. She denies HA, VC, RUQ pain. She has been active yesterday while here. Kids are going to visit soon this AM. No acute issues today.    ROS:  Constitutional: denies fevers  Cardiovascular: denies chest pain  Respiratory: denies dyspnea  Gastrointestinal: denies abdominal pain, RUQ pain  Neuro: denies headaches, vision changes  OB: see HPI  MSK: denies calf pain    Objective:  Vitals:  Temp:  [98.2  F (36.8  C)-99.7  F (37.6  C)] 98.4  F (36.9  C)  Resp:  [15-16] 15  BP: (112-153)/(66-98) 127/71  SpO2:  [100 %] 100 %    Exam:  General: no acute distress  Cardiovascular: pulses intact, trace peripheral edema  Pulmonary: no respiratory difficulty, normal non-labored breathing  Abdomen: gravid, soft, non-tender to palpation, no RUQ or fundal tenderness  Extremities: BL lower extremities non-tender, no palpable cords  Psych: mood appropriate    FHT: tracings reactive    Labs/Imaging:  Results for orders placed or performed during the hospital encounter of 12/21/23 (from the past 24 hour(s))   Protein  random urine   Result Value Ref Range    Total Protein Urine mg/dL <6.0   mg/dL    Total Protein Urine mg/mg Creat      Creatinine Urine mg/dL 7.1 mg/dL   UA with Microscopic reflex to Culture    Specimen: Urine, Clean Catch   Result Value Ref Range    Color Urine Straw Colorless, Straw, Light Yellow, Yellow    Appearance Urine Clear Clear    Glucose Urine Negative Negative mg/dL    Bilirubin Urine Negative Negative    Ketones Urine Negative Negative mg/dL    Specific Gravity Urine 1.003 1.003 - 1.035     Blood Urine Negative Negative    pH Urine 7.0 5.0 - 7.0    Protein Albumin Urine Negative Negative mg/dL    Urobilinogen Urine Normal Normal, 2.0 mg/dL    Nitrite Urine Negative Negative    Leukocyte Esterase Urine Negative Negative    RBC Urine 1 <=2 /HPF    WBC Urine 1 <=5 /HPF    Squamous Epithelials Urine <1 <=1 /HPF    Narrative    Urine Culture not indicated   CBC with platelets differential    Narrative    The following orders were created for panel order CBC with platelets differential.  Procedure                               Abnormality         Status                     ---------                               -----------         ------                     CBC with platelets and d...[[id number]]  Abnormal            Final result                 Please view results for these tests on the individual orders.   ABO/Rh type and screen    Narrative    The following orders were created for panel order ABO/Rh type and screen.  Procedure                               Abnormality         Status                     ---------                               -----------         ------                     Adult Type and Screen[[id number]]                            Final result                 Please view results for these tests on the individual orders.   Comprehensive metabolic panel   Result Value Ref Range    Sodium 137 135 - 145 mmol/L    Potassium 3.1 (L) 3.4 - 5.3 mmol/L    Carbon Dioxide (CO2) 27 22 - 29 mmol/L    Anion Gap 10 7 - 15 mmol/L    Urea Nitrogen 9.5 6.0 - 20.0 mg/dL    Creatinine 0.40 (L) 0.51 - 0.95 mg/dL    GFR Estimate >90 >60 mL/min/1.73m2    Calcium 9.3 8.6 - 10.0 mg/dL    Chloride 100 98 - 107 mmol/L    Glucose 122 (H) 70 - 99 mg/dL    Alkaline Phosphatase 151 (H) 40 - 150 U/L    AST 18 0 - 45 U/L    ALT 22 0 - 50 U/L    Protein Total 7.0 6.4 - 8.3 g/dL    Albumin 3.5 3.5 - 5.2 g/dL    Bilirubin Total 0.2 <=1.2 mg/dL   CBC with platelets and differential   Result Value Ref Range    WBC Count 7.7  4.0 - 11.0 10e3/uL    RBC Count 3.70 (L) 3.80 - 5.20 10e6/uL    Hemoglobin 10.3 (L) 11.7 - 15.7 g/dL    Hematocrit 31.3 (L) 35.0 - 47.0 %    MCV 85 78 - 100 fL    MCH 27.8 26.5 - 33.0 pg    MCHC 32.9 31.5 - 36.5 g/dL    RDW 14.5 10.0 - 15.0 %    Platelet Count 210 150 - 450 10e3/uL    % Neutrophils 60 %    % Lymphocytes 29 %    % Monocytes 7 %    % Eosinophils 2 %    % Basophils 0 %    % Immature Granulocytes 2 %    NRBCs per 100 WBC 0 <1 /100    Absolute Neutrophils 4.8 1.6 - 8.3 10e3/uL    Absolute Lymphocytes 2.2 0.8 - 5.3 10e3/uL    Absolute Monocytes 0.5 0.0 - 1.3 10e3/uL    Absolute Eosinophils 0.1 0.0 - 0.7 10e3/uL    Absolute Basophils 0.0 0.0 - 0.2 10e3/uL    Absolute Immature Granulocytes 0.1 <=0.4 10e3/uL    Absolute NRBCs 0.0 10e3/uL   Adult Type and Screen   Result Value Ref Range    ABO/RH(D) O POS     Antibody Screen Negative Negative    SPECIMEN EXPIRATION DATE [id number]    Glucose by meter   Result Value Ref Range    GLUCOSE BY METER POCT 103 (H) 70 - 99 mg/dL   Glucose by meter   Result Value Ref Range    GLUCOSE BY METER POCT 104 (H) 70 - 99 mg/dL   Glucose by meter   Result Value Ref Range    GLUCOSE BY METER POCT 127 (H) 70 - 99 mg/dL       Meds:    Current Facility-Administered Medications:     acetaminophen (TYLENOL) tablet 650 mg, 650 mg, Oral, Q4H PRN, Heide Ravi MD    amLODIPine (NORVASC) tablet 10 mg, 10 mg, Oral, Daily, Heide Ravi MD    glucose gel 15-30 g, 15-30 g, Oral, Q15 Min PRN **OR** dextrose 50 % injection 25-50 mL, 25-50 mL, Intravenous, Q15 Min PRN **OR** glucagon injection 1 mg, 1 mg, Subcutaneous, Q15 Min PRN, Heide Ravi MD    ferrous sulfate CR tablet 140 mg, 140 mg, Oral, Daily with breakfast, Heide Ravi MD    folic acid (FOLVITE) tablet 400 mcg, 400 mcg, Oral, Daily, Heide Ravi MD    labetalol (NORMODYNE) tablet 300 mg, 300 mg, Oral, Q12H [facility] (08/20), Heide Ravi MD, 300 mg at 12/21/23 1925    metoclopramide  (REGLAN) injection 10 mg, 10 mg, Intravenous, Q6H PRN **OR** metoclopramide (REGLAN) tablet 10 mg, 10 mg, Oral, Q6H PRN, Heide Ravi MD    No Tdap Needed - Assessment: Patient does not need Tdap vaccine, , Does not apply, Continuous PRN, Heide Ravi MD    ondansetron (ZOFRAN ODT) ODT tab 4 mg, 4 mg, Oral, Q6H PRN **OR** ondansetron (ZOFRAN) injection 4 mg, 4 mg, Intravenous, Q6H PRN, Heide Ravi MD    prenatal multivitamin w/iron per tablet 1 tablet, 1 tablet, Oral, Daily, Heide Ravi MD    prochlorperazine (COMPAZINE) injection 10 mg, 10 mg, Intravenous, Q6H PRN **OR** prochlorperazine (COMPAZINE) tablet 10 mg, 10 mg, Oral, Q6H PRN **OR** prochlorperazine (COMPAZINE) suppository 25 mg, 25 mg, Rectal, Q12H PRN, Heide Ravi MD    zolpidem (AMBIEN) tablet 5 mg, 5 mg, Oral, At Bedtime PRN, Nida Mcmanus MD, 5 mg at 12/21/23 2302      MD Nabil Lazo OBGYN, PA  12/22/2023, 7:54 AM

## 2023-12-22 NOTE — UTILIZATION REVIEW
"  Admission Status; Secondary Review Determination     Admission Date: 12/21/2023 10:14 AM      Under the authority of the Utilization Management Committee, the utilization review process indicated a secondary review on the above patient.  The review outcome is based on review of the medical records, discussions with staff, and applying clinical experience noted on the date of the review.        (x)      Inpatient Status Appropriate - This patient's medical care is consistent with medical management for inpatient care and reasonable inpatient medical practice.      () Observation Status Appropriate - This patient does not meet hospital inpatient criteria and is placed in observation status. If this patient's primary payer is Medicare and was admitted as an inpatient, Condition Code 44 should be used and patient status changed to \"observation\".   () Admission Status NOT Appropriate - This patient's medical care is not consistent with medical management for Inpatient or Observation Status.          RATIONALE FOR DETERMINATION     Maegan German is a 43-year-old female who is currently 34 weeks pregnant.  She was sent to the hospital from clinic due to severe poorly controlled hypertension.  She does need close monitoring due to complicated pregnancy.  She is requiring adjustment in dosing of antihypertensive medications.  Currently, labetalol is being increased.  Continues to require close monitoring.  Expected to require a prolonged hospital stay.  Due to this patient's complicated pregnancy, poorly controlled hypertension, need for continued antihypertensive medication adjustments, need for close monitoring, and expectation of a prolonged hospital stay, it is appropriate to have her admitted to the hospital as an inpatient.    The severity of illness, intensity of service provided, expected LOS and risk for adverse outcome make the care complex, high risk and appropriate for hospital admission.        The " information on this document is developed by the utilization review team in order for the business office to ensure compliance.  This only denotes the appropriateness of proper admission status and does not reflect the quality of care rendered.         The definitions of Inpatient Status and Observation Status used in making the determination above are those provided in the CMS Coverage Manual, Chapter 1 and Chapter 6, section 70.4.      Sincerely,     Jerry Lancaster D.O.  Utilization Review/ Case Management  [facility].

## 2023-12-22 NOTE — PLAN OF CARE
Patient ambulated in main x3 today. Blood pressure monitored after walking. Patient denies headache, visual disturbances or epigastric pain. Patient voiding without difficulty. +fetal movement.

## 2023-12-23 VITALS
HEART RATE: 88 BPM | OXYGEN SATURATION: 100 % | DIASTOLIC BLOOD PRESSURE: 82 MMHG | SYSTOLIC BLOOD PRESSURE: 139 MMHG | RESPIRATION RATE: 20 BRPM | TEMPERATURE: 97.6 F

## 2023-12-23 LAB — GLUCOSE BLDC GLUCOMTR-MCNC: 86 MG/DL (ref 70–99)

## 2023-12-23 PROCEDURE — 250N000013 HC RX MED GY IP 250 OP 250 PS 637: Performed by: OBSTETRICS & GYNECOLOGY

## 2023-12-23 RX ORDER — LABETALOL 300 MG/1
300 TABLET, FILM COATED ORAL EVERY 12 HOURS
Qty: 45 TABLET | Refills: 0 | Status: ON HOLD | OUTPATIENT
Start: 2023-12-23 | End: 2024-01-11

## 2023-12-23 RX ADMIN — LABETALOL HYDROCHLORIDE 300 MG: 200 TABLET, FILM COATED ORAL at 08:37

## 2023-12-23 RX ADMIN — AMLODIPINE BESYLATE 10 MG: 10 TABLET ORAL at 08:36

## 2023-12-23 RX ADMIN — PRENATAL VITAMINS-IRON FUMARATE 27 MG IRON-FOLIC ACID 0.8 MG TABLET 1 TABLET: at 08:37

## 2023-12-23 RX ADMIN — Medication 140 MG: at 08:36

## 2023-12-23 RX ADMIN — FOLIC ACID TAB 400 MCG 400 MCG: 400 TAB at 08:36

## 2023-12-23 ASSESSMENT — ACTIVITIES OF DAILY LIVING (ADL)
ADLS_ACUITY_SCORE: 18

## 2023-12-23 NOTE — PROVIDER NOTIFICATION
12/23/23 0900   Provider Notification   Provider Name/Title Dr. Ravi   Method of Notification At Bedside   Notification Reason Status Update

## 2023-12-23 NOTE — DISCHARGE SUMMARY
[facility]    Discharge Summary  Obstetrics    Date of Admission:  2023  Date of Discharge:  2023  Discharging Provider: Heide Ravi MD    Discharge Diagnoses   IUP@ 34+4  CHTN  Diet-controlled gestational diabetes  Physiologic anemic of pregnancy      History of Present Illness and Hospital Course  Maegan German is a 43 year old [id number] who was admitted at 34+2 due to poorly controlled HTN despite recent addition and escalation of anti-hypertensive medication. She has been on amlodipine 10 mg (pre-pregnancy medication) throughout this pregnancy with good control of blood pressures. She was previously admitted  due to increasing blood pressures and was discharged with the addition of labetalol 100 mg BID which was then increased to 200 mg BID later that week. She presented to a scheduled office appointment  with severe range blood pressure and headache. She was admitted, and all blood pressures were mild range. Labetalol was increased to 300 mg BID. Preeclampsia labs were normal. Fetal status reassuring. Blood sugars well-controlled. Known anemia stable - continue iron supplementation. She was discharged to home with plan for twice weekly appointments going forward. Will move  up to 37 weeks.     Post-partum hemoglobin:   Hemoglobin   Date Value Ref Range Status   2023 10.3 (L) 11.7 - 15.7 g/dL Final   03/15/2019 9.6 (L) 11.7 - 15.7 g/dL Final       Heide Ravi MD  2023 1134    Discharge Disposition   stable    Unresulted Labs Ordered in the Past 30 Days of this Admission       No orders found from 2023 to 2023.            Primary Care Physician   Heide Ravi    Consultations This Hospital Stay   None    Discharge Orders      Reason for your hospital stay    Worsening hypertension     Follow-up and recommended labs and tests     Follow-up at [facility] as scheduled      Discharge Medications    Discharge Medication List as of 12/23/2023  9:29 AM        CONTINUE these medications which have CHANGED    Details   labetalol (NORMODYNE) 300 MG tablet Take 1 tablet (300 mg) by mouth every 12 hours, Disp-45 tablet, R-0, E-Prescribe           CONTINUE these medications which have NOT CHANGED    Details   amLODIPine (NORVASC) 10 MG tablet Take 10 mg by mouth daily, Historical      aspirin 81 MG EC tablet Take 81 mg by mouth daily, Historical      ferrous sulfate dried 160 (50 Fe) MG tablet Take 1 tablet by mouth daily (with breakfast), Historical      folic acid (FOLVITE) 400 MCG tablet Take 400 mcg by mouth daily, Historical      Prenatal Vit-Fe Fumarate-FA (PNV PRENATAL PLUS MULTIVITAMIN) 27-1 MG TABS per tablet Take 1 tablet by mouth daily, Historical           STOP taking these medications       acetaminophen (TYLENOL) 500 MG tablet Comments:   Reason for Stopping:         furosemide (LASIX) 20 MG tablet Comments:   Reason for Stopping:         zolpidem (AMBIEN) 5 MG tablet Comments:   Reason for Stopping:             Allergies   Allergies   Allergen Reactions    Penicillins Hives     PN:  HIVES

## 2023-12-23 NOTE — PLAN OF CARE
Discharge instructions discussed with patient. All questions answered. Patient expressed verbal understanding of and willingness to follow through with POC/instructions. All belongings gathered. Patient escorted to auto with all belongings by RN. Patient to home.

## 2023-12-23 NOTE — DISCHARGE INSTRUCTIONS
Please call [facility] if blood pressures are persistently >140/90 or if you experience severe headache, vision changes, chest pain, shortness of breath, pain in the upper abdomen, contractions, loss of fluid, vaginal bleeding, or decreased fetal movement.       Discharge Instruction for Undelivered Patients      You were seen for:  Elevated Blood Pressures  We Consulted: Dr. Ravi, Dr. Bunch  You had (Test or Medicine):Blood pressure monitoring, medication adjustment, external fetal and uterine monitoring     Diet:   To manage your diabetes, follow the guidelines for eating and drinking given to you by your Clinic Provider or Diabetes Educator.       Activity:  Call your doctor or nurse midwife if your baby is moving less than usual.     Call your provider if you notice:  Swelling in your face or increased swelling in your hands or legs.  Headaches that are not relieved by Tylenol (acetaminophen).  Changes in your vision (blurring: seeing spots or stars.)  Nausea (sick to your stomach) and vomiting (throwing up).   Weight gain of 5 pounds or more per week.  Heartburn that doesn't go away.  Signs of bladder infection: pain when you urinate (use the toilet), need to go more often and more urgently.  The bag of coppola (rupture of membranes) breaks, or you notice leaking in your underwear.  Bright red blood in your underwear.  Abdominal (lower belly) or stomach pain.  Second (plus) baby: Contractions (tightening) less than 10 minutes apart and getting stronger.  Increase or change in vaginal discharge (note the color and amount)  Other: Adjust medication as discussed with Dr. Ravi.    Follow-up:  As scheduled in the clinic next week.

## 2023-12-23 NOTE — PROGRESS NOTES
[facility]  Obstetrics - Antepartum  Maegan German  1980  [id number]        Assessment/Plan:  Maegan German is a  at 34w4d who admitted for poorly controlled cHTN. Today is HD#2.     cHTN:  - Severe-range BP prior to admission at home and in office, prompting admission  - BP since admission normotensive to mild-range, no further severe range BPs. Well-controlled on amlodipine 10 mg daily and now labetalol 300 mg BID. Bps as low as 100s/70s. Only one mild range (DBP 92) over the last 24 hours).  - PreE labs at admission all WNL (serum and urine), not SI preE at present  - Received BMZ course during last admission for same issue (, ). No need to repeat course at this time  - Continuous EFM reactive, transition to TID     GDM, A1:  - BGs remain controlled w/ diet since admission  - Cont fasting and PP checks per routine  - Anticipate surge in BGs if requires repeat course of BMZ but for now no indication for that     34w GA pregnancy:  - Breech on last check and for RLTCS anyway  - Will need growth check in the next 1-2w and will need qWeek BPP if needing prolonged inpt stay (though hopeful for discharge home over this weekend)  - Cont PNV, folate, iron  - Monitoring as above  - Admission UA WNL  - If pt with persistent severe-range BPs, transitions to SI preE w/ SF, arjun being 34+ wks now, would likely be for delivery. Otherwise, likely delivery around 37w for cHTN req meds and worsening control.    Dispo: stable for discharge to home today    Subjective:  Doing well. No concerns. Denies HA, vision changes, CP, SOB, RUQ/epigastric pain. No ctx, LOF, VB. +FM.    Objective:  /82   Pulse 88   Temp 98.1  F (36.7  C) (Temporal)   Resp 15   LMP 2023   SpO2 100%   Gen: NAD  Abd: soft, NT, ND, gravid    Just about to be put on the monitor for the morning    Heide Raiv MD  2023 [id number]

## 2024-01-07 ENCOUNTER — HOSPITAL ENCOUNTER (INPATIENT)
Facility: CLINIC | Age: 44
LOS: 5 days | Discharge: HOME | End: 2024-01-12
Attending: OBSTETRICS & GYNECOLOGY | Admitting: OBSTETRICS & GYNECOLOGY
Payer: OTHER GOVERNMENT

## 2024-01-07 ENCOUNTER — ANESTHESIA EVENT (OUTPATIENT)
Dept: OBGYN | Facility: CLINIC | Age: 44
End: 2024-01-07
Payer: OTHER GOVERNMENT

## 2024-01-07 ENCOUNTER — ANESTHESIA (OUTPATIENT)
Dept: OBGYN | Facility: CLINIC | Age: 44
End: 2024-01-07
Payer: OTHER GOVERNMENT

## 2024-01-07 DIAGNOSIS — Z98.891 S/P CESAREAN SECTION: ICD-10-CM

## 2024-01-07 DIAGNOSIS — Z98.891 S/P C-SECTION: ICD-10-CM

## 2024-01-07 PROBLEM — Z34.90 PREGNANCY: Status: ACTIVE | Noted: 2024-01-07

## 2024-01-07 LAB
ABO/RH(D): NORMAL
ALBUMIN SERPL BCG-MCNC: 3.9 G/DL (ref 3.5–5.2)
ALP SERPL-CCNC: 169 U/L (ref 40–150)
ALT SERPL W P-5'-P-CCNC: 17 U/L (ref 0–50)
ANION GAP SERPL CALCULATED.3IONS-SCNC: 11 MMOL/L (ref 7–15)
ANTIBODY SCREEN: NEGATIVE
AST SERPL W P-5'-P-CCNC: 21 U/L (ref 0–45)
BILIRUB SERPL-MCNC: 0.2 MG/DL
BUN SERPL-MCNC: 10 MG/DL (ref 6–20)
CALCIUM SERPL-MCNC: 9.5 MG/DL (ref 8.6–10)
CHLORIDE SERPL-SCNC: 102 MMOL/L (ref 98–107)
CREAT SERPL-MCNC: 0.53 MG/DL (ref 0.51–0.95)
DEPRECATED HCO3 PLAS-SCNC: 26 MMOL/L (ref 22–29)
EGFRCR SERPLBLD CKD-EPI 2021: >90 ML/MIN/1.73M2
ERYTHROCYTE [DISTWIDTH] IN BLOOD BY AUTOMATED COUNT: 15.1 % (ref 10–15)
GLUCOSE BLDC GLUCOMTR-MCNC: 108 MG/DL (ref 70–99)
GLUCOSE BLDC GLUCOMTR-MCNC: 98 MG/DL (ref 70–99)
GLUCOSE SERPL-MCNC: 97 MG/DL (ref 70–99)
HCT VFR BLD AUTO: 33.8 % (ref 35–47)
HGB BLD-MCNC: 11.2 G/DL (ref 11.7–15.7)
MCH RBC QN AUTO: 28.1 PG (ref 26.5–33)
MCHC RBC AUTO-ENTMCNC: 33.1 G/DL (ref 31.5–36.5)
MCV RBC AUTO: 85 FL (ref 78–100)
PLATELET # BLD AUTO: 229 10E3/UL (ref 150–450)
POTASSIUM SERPL-SCNC: 3.2 MMOL/L (ref 3.4–5.3)
PROT SERPL-MCNC: 7.4 G/DL (ref 6.4–8.3)
RBC # BLD AUTO: 3.98 10E6/UL (ref 3.8–5.2)
SODIUM SERPL-SCNC: 139 MMOL/L (ref 135–145)
SPECIMEN EXPIRATION DATE: NORMAL
WBC # BLD AUTO: 7.5 10E3/UL (ref 4–11)

## 2024-01-07 PROCEDURE — 250N000013 HC RX MED GY IP 250 OP 250 PS 637: Performed by: OBSTETRICS & GYNECOLOGY

## 2024-01-07 PROCEDURE — 250N000013 HC RX MED GY IP 250 OP 250 PS 637

## 2024-01-07 PROCEDURE — 0UB90ZZ EXCISION OF UTERUS, OPEN APPROACH: ICD-10-PCS | Performed by: OBSTETRICS & GYNECOLOGY

## 2024-01-07 PROCEDURE — 88305 TISSUE EXAM BY PATHOLOGIST: CPT | Mod: 26 | Performed by: PATHOLOGY

## 2024-01-07 PROCEDURE — 272N000001 HC OR GENERAL SUPPLY STERILE: Performed by: OBSTETRICS & GYNECOLOGY

## 2024-01-07 PROCEDURE — 36415 COLL VENOUS BLD VENIPUNCTURE: CPT | Performed by: OBSTETRICS & GYNECOLOGY

## 2024-01-07 PROCEDURE — 250N000011 HC RX IP 250 OP 636: Performed by: ANESTHESIOLOGY

## 2024-01-07 PROCEDURE — 0HB7XZZ EXCISION OF ABDOMEN SKIN, EXTERNAL APPROACH: ICD-10-PCS | Performed by: OBSTETRICS & GYNECOLOGY

## 2024-01-07 PROCEDURE — 120N000012 HC R&B POSTPARTUM

## 2024-01-07 PROCEDURE — 85027 COMPLETE CBC AUTOMATED: CPT | Performed by: OBSTETRICS & GYNECOLOGY

## 2024-01-07 PROCEDURE — 80053 COMPREHEN METABOLIC PANEL: CPT | Performed by: OBSTETRICS & GYNECOLOGY

## 2024-01-07 PROCEDURE — 250N000025 HC SEVOFLURANE, PER MIN: Performed by: OBSTETRICS & GYNECOLOGY

## 2024-01-07 PROCEDURE — 88307 TISSUE EXAM BY PATHOLOGIST: CPT | Mod: 26 | Performed by: PATHOLOGY

## 2024-01-07 PROCEDURE — 88307 TISSUE EXAM BY PATHOLOGIST: CPT | Mod: TC | Performed by: OBSTETRICS & GYNECOLOGY

## 2024-01-07 PROCEDURE — 258N000003 HC RX IP 258 OP 636: Performed by: OBSTETRICS & GYNECOLOGY

## 2024-01-07 PROCEDURE — 360N000076 HC SURGERY LEVEL 3, PER MIN: Performed by: OBSTETRICS & GYNECOLOGY

## 2024-01-07 PROCEDURE — 86780 TREPONEMA PALLIDUM: CPT | Performed by: OBSTETRICS & GYNECOLOGY

## 2024-01-07 PROCEDURE — 370N000017 HC ANESTHESIA TECHNICAL FEE, PER MIN: Performed by: OBSTETRICS & GYNECOLOGY

## 2024-01-07 PROCEDURE — 250N000011 HC RX IP 250 OP 636: Performed by: NURSE ANESTHETIST, CERTIFIED REGISTERED

## 2024-01-07 PROCEDURE — 250N000009 HC RX 250: Performed by: NURSE ANESTHETIST, CERTIFIED REGISTERED

## 2024-01-07 PROCEDURE — 999N000016 HC STATISTIC ATTENDANCE AT DELIVERY

## 2024-01-07 PROCEDURE — G0463 HOSPITAL OUTPT CLINIC VISIT: HCPCS

## 2024-01-07 PROCEDURE — 250N000011 HC RX IP 250 OP 636: Performed by: OBSTETRICS & GYNECOLOGY

## 2024-01-07 PROCEDURE — 86900 BLOOD TYPING SEROLOGIC ABO: CPT | Performed by: OBSTETRICS & GYNECOLOGY

## 2024-01-07 PROCEDURE — 250N000011 HC RX IP 250 OP 636

## 2024-01-07 PROCEDURE — 258N000003 HC RX IP 258 OP 636: Performed by: NURSE ANESTHETIST, CERTIFIED REGISTERED

## 2024-01-07 PROCEDURE — 710N000009 HC RECOVERY PHASE 1, LEVEL 1, PER MIN: Performed by: OBSTETRICS & GYNECOLOGY

## 2024-01-07 RX ORDER — OXYTOCIN/0.9 % SODIUM CHLORIDE 30/500 ML
100-340 PLASTIC BAG, INJECTION (ML) INTRAVENOUS CONTINUOUS PRN
Status: DISCONTINUED | OUTPATIENT
Start: 2024-01-07 | End: 2024-01-07

## 2024-01-07 RX ORDER — BISACODYL 10 MG
10 SUPPOSITORY, RECTAL RECTAL DAILY PRN
Status: DISCONTINUED | OUTPATIENT
Start: 2024-01-09 | End: 2024-01-12 | Stop reason: HOSPADM

## 2024-01-07 RX ORDER — NALBUPHINE HYDROCHLORIDE 20 MG/ML
2.5-5 INJECTION, SOLUTION INTRAMUSCULAR; INTRAVENOUS; SUBCUTANEOUS EVERY 6 HOURS PRN
Status: DISCONTINUED | OUTPATIENT
Start: 2024-01-07 | End: 2024-01-12 | Stop reason: HOSPADM

## 2024-01-07 RX ORDER — NALOXONE HYDROCHLORIDE 0.4 MG/ML
0.2 INJECTION, SOLUTION INTRAMUSCULAR; INTRAVENOUS; SUBCUTANEOUS
Status: DISCONTINUED | OUTPATIENT
Start: 2024-01-07 | End: 2024-01-12 | Stop reason: HOSPADM

## 2024-01-07 RX ORDER — MISOPROSTOL 200 UG/1
400 TABLET ORAL
Status: DISCONTINUED | OUTPATIENT
Start: 2024-01-07 | End: 2024-01-12 | Stop reason: HOSPADM

## 2024-01-07 RX ORDER — MISOPROSTOL 200 UG/1
800 TABLET ORAL
Status: DISCONTINUED | OUTPATIENT
Start: 2024-01-07 | End: 2024-01-12 | Stop reason: HOSPADM

## 2024-01-07 RX ORDER — LIDOCAINE 40 MG/G
CREAM TOPICAL
Status: DISCONTINUED | OUTPATIENT
Start: 2024-01-07 | End: 2024-01-07 | Stop reason: HOSPADM

## 2024-01-07 RX ORDER — SODIUM CHLORIDE, SODIUM LACTATE, POTASSIUM CHLORIDE, CALCIUM CHLORIDE 600; 310; 30; 20 MG/100ML; MG/100ML; MG/100ML; MG/100ML
INJECTION, SOLUTION INTRAVENOUS CONTINUOUS
Status: DISCONTINUED | OUTPATIENT
Start: 2024-01-07 | End: 2024-01-07 | Stop reason: HOSPADM

## 2024-01-07 RX ORDER — OXYTOCIN 10 [USP'U]/ML
10 INJECTION, SOLUTION INTRAMUSCULAR; INTRAVENOUS
Status: DISCONTINUED | OUTPATIENT
Start: 2024-01-07 | End: 2024-01-12 | Stop reason: HOSPADM

## 2024-01-07 RX ORDER — ONDANSETRON 2 MG/ML
INJECTION INTRAMUSCULAR; INTRAVENOUS PRN
Status: DISCONTINUED | OUTPATIENT
Start: 2024-01-07 | End: 2024-01-07

## 2024-01-07 RX ORDER — KETOROLAC TROMETHAMINE 30 MG/ML
30 INJECTION, SOLUTION INTRAMUSCULAR; INTRAVENOUS EVERY 6 HOURS
Status: COMPLETED | OUTPATIENT
Start: 2024-01-07 | End: 2024-01-08

## 2024-01-07 RX ORDER — FENTANYL CITRATE 50 UG/ML
INJECTION, SOLUTION INTRAMUSCULAR; INTRAVENOUS
Status: COMPLETED
Start: 2024-01-07 | End: 2024-01-07

## 2024-01-07 RX ORDER — HYDRALAZINE HYDROCHLORIDE 20 MG/ML
2.5-5 INJECTION INTRAMUSCULAR; INTRAVENOUS EVERY 10 MIN PRN
Status: DISCONTINUED | OUTPATIENT
Start: 2024-01-07 | End: 2024-01-07 | Stop reason: HOSPADM

## 2024-01-07 RX ORDER — TRANEXAMIC ACID 10 MG/ML
1 INJECTION, SOLUTION INTRAVENOUS EVERY 30 MIN PRN
Status: DISCONTINUED | OUTPATIENT
Start: 2024-01-07 | End: 2024-01-12 | Stop reason: HOSPADM

## 2024-01-07 RX ORDER — AMLODIPINE BESYLATE 10 MG/1
10 TABLET ORAL DAILY
Status: DISCONTINUED | OUTPATIENT
Start: 2024-01-08 | End: 2024-01-12 | Stop reason: HOSPADM

## 2024-01-07 RX ORDER — DEXAMETHASONE SODIUM PHOSPHATE 4 MG/ML
INJECTION, SOLUTION INTRA-ARTICULAR; INTRALESIONAL; INTRAMUSCULAR; INTRAVENOUS; SOFT TISSUE PRN
Status: DISCONTINUED | OUTPATIENT
Start: 2024-01-07 | End: 2024-01-07

## 2024-01-07 RX ORDER — OXYTOCIN/0.9 % SODIUM CHLORIDE 30/500 ML
PLASTIC BAG, INJECTION (ML) INTRAVENOUS CONTINUOUS PRN
Status: DISCONTINUED | OUTPATIENT
Start: 2024-01-07 | End: 2024-01-07

## 2024-01-07 RX ORDER — CARBOPROST TROMETHAMINE 250 UG/ML
250 INJECTION, SOLUTION INTRAMUSCULAR
Status: DISCONTINUED | OUTPATIENT
Start: 2024-01-07 | End: 2024-01-12 | Stop reason: HOSPADM

## 2024-01-07 RX ORDER — LIDOCAINE 40 MG/G
CREAM TOPICAL
Status: DISCONTINUED | OUTPATIENT
Start: 2024-01-07 | End: 2024-01-08

## 2024-01-07 RX ORDER — CITRIC ACID/SODIUM CITRATE 334-500MG
30 SOLUTION, ORAL ORAL
Status: DISCONTINUED | OUTPATIENT
Start: 2024-01-07 | End: 2024-01-07 | Stop reason: HOSPADM

## 2024-01-07 RX ORDER — OXYTOCIN 10 [USP'U]/ML
10 INJECTION, SOLUTION INTRAMUSCULAR; INTRAVENOUS
Status: DISCONTINUED | OUTPATIENT
Start: 2024-01-07 | End: 2024-01-07 | Stop reason: HOSPADM

## 2024-01-07 RX ORDER — CITRIC ACID/SODIUM CITRATE 334-500MG
30 SOLUTION, ORAL ORAL ONCE
Status: COMPLETED | OUTPATIENT
Start: 2024-01-07 | End: 2024-01-07

## 2024-01-07 RX ORDER — MORPHINE SULFATE 1 MG/ML
150 INJECTION, SOLUTION EPIDURAL; INTRATHECAL; INTRAVENOUS ONCE
Status: DISCONTINUED | OUTPATIENT
Start: 2024-01-07 | End: 2024-01-07

## 2024-01-07 RX ORDER — NALOXONE HYDROCHLORIDE 0.4 MG/ML
0.4 INJECTION, SOLUTION INTRAMUSCULAR; INTRAVENOUS; SUBCUTANEOUS
Status: DISCONTINUED | OUTPATIENT
Start: 2024-01-07 | End: 2024-01-12 | Stop reason: HOSPADM

## 2024-01-07 RX ORDER — HYDROCORTISONE 25 MG/G
CREAM TOPICAL 3 TIMES DAILY PRN
Status: DISCONTINUED | OUTPATIENT
Start: 2024-01-07 | End: 2024-01-12 | Stop reason: HOSPADM

## 2024-01-07 RX ORDER — METHYLERGONOVINE MALEATE 0.2 MG/ML
200 INJECTION INTRAVENOUS
Status: DISCONTINUED | OUTPATIENT
Start: 2024-01-07 | End: 2024-01-07 | Stop reason: HOSPADM

## 2024-01-07 RX ORDER — HYDRALAZINE HYDROCHLORIDE 20 MG/ML
10 INJECTION INTRAMUSCULAR; INTRAVENOUS
Status: DISCONTINUED | OUTPATIENT
Start: 2024-01-07 | End: 2024-01-12 | Stop reason: HOSPADM

## 2024-01-07 RX ORDER — MODIFIED LANOLIN
OINTMENT (GRAM) TOPICAL
Status: DISCONTINUED | OUTPATIENT
Start: 2024-01-07 | End: 2024-01-12 | Stop reason: HOSPADM

## 2024-01-07 RX ORDER — TERBUTALINE SULFATE 1 MG/ML
INJECTION, SOLUTION SUBCUTANEOUS
Status: COMPLETED
Start: 2024-01-07 | End: 2024-01-07

## 2024-01-07 RX ORDER — TRANEXAMIC ACID 10 MG/ML
1 INJECTION, SOLUTION INTRAVENOUS EVERY 30 MIN PRN
Status: DISCONTINUED | OUTPATIENT
Start: 2024-01-07 | End: 2024-01-07 | Stop reason: HOSPADM

## 2024-01-07 RX ORDER — SODIUM CHLORIDE, SODIUM LACTATE, POTASSIUM CHLORIDE, CALCIUM CHLORIDE 600; 310; 30; 20 MG/100ML; MG/100ML; MG/100ML; MG/100ML
10-125 INJECTION, SOLUTION INTRAVENOUS CONTINUOUS
Status: DISCONTINUED | OUTPATIENT
Start: 2024-01-07 | End: 2024-01-12 | Stop reason: HOSPADM

## 2024-01-07 RX ORDER — HYDROMORPHONE HCL IN WATER/PF 6 MG/30 ML
0.3 PATIENT CONTROLLED ANALGESIA SYRINGE INTRAVENOUS
Status: DISCONTINUED | OUTPATIENT
Start: 2024-01-07 | End: 2024-01-09

## 2024-01-07 RX ORDER — FENTANYL CITRATE 50 UG/ML
INJECTION, SOLUTION INTRAMUSCULAR; INTRAVENOUS PRN
Status: DISCONTINUED | OUTPATIENT
Start: 2024-01-07 | End: 2024-01-07

## 2024-01-07 RX ORDER — MORPHINE SULFATE 1 MG/ML
INJECTION, SOLUTION EPIDURAL; INTRATHECAL; INTRAVENOUS PRN
Status: DISCONTINUED | OUTPATIENT
Start: 2024-01-07 | End: 2024-01-07

## 2024-01-07 RX ORDER — ONDANSETRON 2 MG/ML
4 INJECTION INTRAMUSCULAR; INTRAVENOUS EVERY 6 HOURS PRN
Status: DISCONTINUED | OUTPATIENT
Start: 2024-01-07 | End: 2024-01-07

## 2024-01-07 RX ORDER — EPHEDRINE SULFATE 50 MG/ML
5 INJECTION, SOLUTION INTRAMUSCULAR; INTRAVENOUS; SUBCUTANEOUS
Status: DISCONTINUED | OUTPATIENT
Start: 2024-01-07 | End: 2024-01-12 | Stop reason: HOSPADM

## 2024-01-07 RX ORDER — FENTANYL CITRATE 50 UG/ML
25 INJECTION, SOLUTION INTRAMUSCULAR; INTRAVENOUS EVERY 5 MIN PRN
Status: DISCONTINUED | OUTPATIENT
Start: 2024-01-07 | End: 2024-01-07 | Stop reason: HOSPADM

## 2024-01-07 RX ORDER — ACETAMINOPHEN 325 MG/1
975 TABLET ORAL EVERY 6 HOURS
Status: DISCONTINUED | OUTPATIENT
Start: 2024-01-07 | End: 2024-01-12 | Stop reason: HOSPADM

## 2024-01-07 RX ORDER — METHYLERGONOVINE MALEATE 0.2 MG/ML
200 INJECTION INTRAVENOUS
Status: DISCONTINUED | OUTPATIENT
Start: 2024-01-07 | End: 2024-01-12 | Stop reason: HOSPADM

## 2024-01-07 RX ORDER — OXYTOCIN/0.9 % SODIUM CHLORIDE 30/500 ML
340 PLASTIC BAG, INJECTION (ML) INTRAVENOUS CONTINUOUS PRN
Status: DISCONTINUED | OUTPATIENT
Start: 2024-01-07 | End: 2024-01-12 | Stop reason: HOSPADM

## 2024-01-07 RX ORDER — OXYCODONE HYDROCHLORIDE 5 MG/1
5 TABLET ORAL EVERY 4 HOURS PRN
Status: DISCONTINUED | OUTPATIENT
Start: 2024-01-07 | End: 2024-01-08

## 2024-01-07 RX ORDER — IBUPROFEN 400 MG/1
800 TABLET, FILM COATED ORAL EVERY 6 HOURS
Status: DISCONTINUED | OUTPATIENT
Start: 2024-01-08 | End: 2024-01-12 | Stop reason: HOSPADM

## 2024-01-07 RX ORDER — PROPOFOL 10 MG/ML
INJECTION, EMULSION INTRAVENOUS PRN
Status: DISCONTINUED | OUTPATIENT
Start: 2024-01-07 | End: 2024-01-07

## 2024-01-07 RX ORDER — MISOPROSTOL 200 UG/1
800 TABLET ORAL
Status: DISCONTINUED | OUTPATIENT
Start: 2024-01-07 | End: 2024-01-07 | Stop reason: HOSPADM

## 2024-01-07 RX ORDER — DEXTROSE, SODIUM CHLORIDE, SODIUM LACTATE, POTASSIUM CHLORIDE, AND CALCIUM CHLORIDE 5; .6; .31; .03; .02 G/100ML; G/100ML; G/100ML; G/100ML; G/100ML
INJECTION, SOLUTION INTRAVENOUS CONTINUOUS
Status: DISCONTINUED | OUTPATIENT
Start: 2024-01-07 | End: 2024-01-12 | Stop reason: HOSPADM

## 2024-01-07 RX ORDER — AMOXICILLIN 250 MG
2 CAPSULE ORAL 2 TIMES DAILY
Status: DISCONTINUED | OUTPATIENT
Start: 2024-01-07 | End: 2024-01-12 | Stop reason: HOSPADM

## 2024-01-07 RX ORDER — CARBOPROST TROMETHAMINE 250 UG/ML
250 INJECTION, SOLUTION INTRAMUSCULAR
Status: DISCONTINUED | OUTPATIENT
Start: 2024-01-07 | End: 2024-01-07 | Stop reason: HOSPADM

## 2024-01-07 RX ORDER — LABETALOL HYDROCHLORIDE 5 MG/ML
10 INJECTION, SOLUTION INTRAVENOUS
Status: DISCONTINUED | OUTPATIENT
Start: 2024-01-07 | End: 2024-01-07 | Stop reason: HOSPADM

## 2024-01-07 RX ORDER — OXYTOCIN/0.9 % SODIUM CHLORIDE 30/500 ML
340 PLASTIC BAG, INJECTION (ML) INTRAVENOUS CONTINUOUS PRN
Status: DISCONTINUED | OUTPATIENT
Start: 2024-01-07 | End: 2024-01-07 | Stop reason: HOSPADM

## 2024-01-07 RX ORDER — ONDANSETRON 4 MG/1
4 TABLET, ORALLY DISINTEGRATING ORAL EVERY 6 HOURS PRN
Status: DISCONTINUED | OUTPATIENT
Start: 2024-01-07 | End: 2024-01-12 | Stop reason: HOSPADM

## 2024-01-07 RX ORDER — LIDOCAINE 40 MG/G
CREAM TOPICAL
Status: DISCONTINUED | OUTPATIENT
Start: 2024-01-07 | End: 2024-01-12 | Stop reason: HOSPADM

## 2024-01-07 RX ORDER — ACETAMINOPHEN 325 MG/1
975 TABLET ORAL ONCE
Status: COMPLETED | OUTPATIENT
Start: 2024-01-07 | End: 2024-01-07

## 2024-01-07 RX ORDER — METOCLOPRAMIDE HYDROCHLORIDE 5 MG/ML
10 INJECTION INTRAMUSCULAR; INTRAVENOUS EVERY 6 HOURS PRN
Status: DISCONTINUED | OUTPATIENT
Start: 2024-01-07 | End: 2024-01-12 | Stop reason: HOSPADM

## 2024-01-07 RX ORDER — KETOROLAC TROMETHAMINE 30 MG/ML
INJECTION, SOLUTION INTRAMUSCULAR; INTRAVENOUS PRN
Status: DISCONTINUED | OUTPATIENT
Start: 2024-01-07 | End: 2024-01-07

## 2024-01-07 RX ORDER — ACETAMINOPHEN 325 MG/1
TABLET ORAL
Status: COMPLETED
Start: 2024-01-07 | End: 2024-01-07

## 2024-01-07 RX ORDER — OXYTOCIN 10 [USP'U]/ML
10 INJECTION, SOLUTION INTRAMUSCULAR; INTRAVENOUS
Status: DISCONTINUED | OUTPATIENT
Start: 2024-01-07 | End: 2024-01-07

## 2024-01-07 RX ORDER — CLINDAMYCIN PHOSPHATE 900 MG/50ML
900 INJECTION, SOLUTION INTRAVENOUS
Status: COMPLETED | OUTPATIENT
Start: 2024-01-07 | End: 2024-01-07

## 2024-01-07 RX ORDER — MISOPROSTOL 200 UG/1
400 TABLET ORAL
Status: DISCONTINUED | OUTPATIENT
Start: 2024-01-07 | End: 2024-01-07 | Stop reason: HOSPADM

## 2024-01-07 RX ORDER — CITRIC ACID/SODIUM CITRATE 334-500MG
SOLUTION, ORAL ORAL
Status: COMPLETED
Start: 2024-01-07 | End: 2024-01-07

## 2024-01-07 RX ORDER — AMOXICILLIN 250 MG
1 CAPSULE ORAL 2 TIMES DAILY
Status: DISCONTINUED | OUTPATIENT
Start: 2024-01-07 | End: 2024-01-12 | Stop reason: HOSPADM

## 2024-01-07 RX ORDER — ONDANSETRON 2 MG/ML
4 INJECTION INTRAMUSCULAR; INTRAVENOUS EVERY 6 HOURS PRN
Status: DISCONTINUED | OUTPATIENT
Start: 2024-01-07 | End: 2024-01-12 | Stop reason: HOSPADM

## 2024-01-07 RX ORDER — PROCHLORPERAZINE 25 MG
25 SUPPOSITORY, RECTAL RECTAL EVERY 12 HOURS PRN
Status: DISCONTINUED | OUTPATIENT
Start: 2024-01-07 | End: 2024-01-12 | Stop reason: HOSPADM

## 2024-01-07 RX ORDER — METOCLOPRAMIDE 10 MG/1
10 TABLET ORAL EVERY 6 HOURS PRN
Status: DISCONTINUED | OUTPATIENT
Start: 2024-01-07 | End: 2024-01-12 | Stop reason: HOSPADM

## 2024-01-07 RX ORDER — ONDANSETRON 4 MG/1
4 TABLET, ORALLY DISINTEGRATING ORAL EVERY 6 HOURS PRN
Status: DISCONTINUED | OUTPATIENT
Start: 2024-01-07 | End: 2024-01-07

## 2024-01-07 RX ORDER — LABETALOL HYDROCHLORIDE 5 MG/ML
20-80 INJECTION, SOLUTION INTRAVENOUS EVERY 10 MIN PRN
Status: DISCONTINUED | OUTPATIENT
Start: 2024-01-07 | End: 2024-01-12 | Stop reason: HOSPADM

## 2024-01-07 RX ORDER — FENTANYL CITRATE 50 UG/ML
50 INJECTION, SOLUTION INTRAMUSCULAR; INTRAVENOUS EVERY 5 MIN PRN
Status: DISCONTINUED | OUTPATIENT
Start: 2024-01-07 | End: 2024-01-07 | Stop reason: HOSPADM

## 2024-01-07 RX ORDER — SIMETHICONE 80 MG
80 TABLET,CHEWABLE ORAL 4 TIMES DAILY PRN
Status: DISCONTINUED | OUTPATIENT
Start: 2024-01-07 | End: 2024-01-12 | Stop reason: HOSPADM

## 2024-01-07 RX ORDER — NICOTINE POLACRILEX 4 MG
15-30 LOZENGE BUCCAL
Status: DISCONTINUED | OUTPATIENT
Start: 2024-01-07 | End: 2024-01-12 | Stop reason: HOSPADM

## 2024-01-07 RX ORDER — PROCHLORPERAZINE MALEATE 10 MG
10 TABLET ORAL EVERY 6 HOURS PRN
Status: DISCONTINUED | OUTPATIENT
Start: 2024-01-07 | End: 2024-01-12 | Stop reason: HOSPADM

## 2024-01-07 RX ORDER — DEXTROSE MONOHYDRATE 25 G/50ML
25-50 INJECTION, SOLUTION INTRAVENOUS
Status: DISCONTINUED | OUTPATIENT
Start: 2024-01-07 | End: 2024-01-12 | Stop reason: HOSPADM

## 2024-01-07 RX ORDER — ONDANSETRON 4 MG/1
4 TABLET, ORALLY DISINTEGRATING ORAL EVERY 30 MIN PRN
Status: DISCONTINUED | OUTPATIENT
Start: 2024-01-07 | End: 2024-01-07 | Stop reason: HOSPADM

## 2024-01-07 RX ORDER — HYDROMORPHONE HCL IN WATER/PF 6 MG/30 ML
0.2 PATIENT CONTROLLED ANALGESIA SYRINGE INTRAVENOUS EVERY 5 MIN PRN
Status: DISCONTINUED | OUTPATIENT
Start: 2024-01-07 | End: 2024-01-07 | Stop reason: HOSPADM

## 2024-01-07 RX ORDER — HYDROMORPHONE HCL IN WATER/PF 6 MG/30 ML
0.4 PATIENT CONTROLLED ANALGESIA SYRINGE INTRAVENOUS EVERY 5 MIN PRN
Status: DISCONTINUED | OUTPATIENT
Start: 2024-01-07 | End: 2024-01-07 | Stop reason: HOSPADM

## 2024-01-07 RX ORDER — ONDANSETRON 2 MG/ML
4 INJECTION INTRAMUSCULAR; INTRAVENOUS EVERY 30 MIN PRN
Status: DISCONTINUED | OUTPATIENT
Start: 2024-01-07 | End: 2024-01-07 | Stop reason: HOSPADM

## 2024-01-07 RX ORDER — TERBUTALINE SULFATE 1 MG/ML
0.25 INJECTION, SOLUTION SUBCUTANEOUS ONCE
Status: COMPLETED | OUTPATIENT
Start: 2024-01-07 | End: 2024-01-07

## 2024-01-07 RX ADMIN — ACETAMINOPHEN 975 MG: 325 TABLET, FILM COATED ORAL at 17:48

## 2024-01-07 RX ADMIN — DEXAMETHASONE SODIUM PHOSPHATE 4 MG: 4 INJECTION, SOLUTION INTRA-ARTICULAR; INTRALESIONAL; INTRAMUSCULAR; INTRAVENOUS; SOFT TISSUE at 14:28

## 2024-01-07 RX ADMIN — DOCUSATE SODIUM 50 MG AND SENNOSIDES 8.6 MG 1 TABLET: 8.6; 5 TABLET, FILM COATED ORAL at 21:31

## 2024-01-07 RX ADMIN — PHENYLEPHRINE HYDROCHLORIDE 150 MCG: 10 INJECTION INTRAVENOUS at 14:29

## 2024-01-07 RX ADMIN — KETOROLAC TROMETHAMINE 30 MG: 30 INJECTION, SOLUTION INTRAMUSCULAR at 14:58

## 2024-01-07 RX ADMIN — OXYCODONE HYDROCHLORIDE 5 MG: 5 TABLET ORAL at 22:09

## 2024-01-07 RX ADMIN — SUCCINYLCHOLINE CHLORIDE 100 MG: 20 INJECTION, SOLUTION INTRAMUSCULAR; INTRAVENOUS; PARENTERAL at 14:01

## 2024-01-07 RX ADMIN — TERBUTALINE SULFATE 0.25 MG: 1 INJECTION, SOLUTION SUBCUTANEOUS at 12:20

## 2024-01-07 RX ADMIN — FENTANYL CITRATE 50 MCG: 50 INJECTION, SOLUTION INTRAMUSCULAR; INTRAVENOUS at 16:59

## 2024-01-07 RX ADMIN — SODIUM CHLORIDE, POTASSIUM CHLORIDE, SODIUM LACTATE AND CALCIUM CHLORIDE: 600; 310; 30; 20 INJECTION, SOLUTION INTRAVENOUS at 11:00

## 2024-01-07 RX ADMIN — OXYCODONE HYDROCHLORIDE 5 MG: 5 TABLET ORAL at 17:48

## 2024-01-07 RX ADMIN — KETOROLAC TROMETHAMINE 30 MG: 30 INJECTION, SOLUTION INTRAMUSCULAR; INTRAVENOUS at 21:31

## 2024-01-07 RX ADMIN — FENTANYL CITRATE 50 MCG: 50 INJECTION INTRAMUSCULAR; INTRAVENOUS at 16:59

## 2024-01-07 RX ADMIN — FENTANYL CITRATE 90 MCG: 50 INJECTION, SOLUTION INTRAMUSCULAR; INTRAVENOUS at 14:07

## 2024-01-07 RX ADMIN — SODIUM CHLORIDE, POTASSIUM CHLORIDE, SODIUM LACTATE AND CALCIUM CHLORIDE 500 ML: 600; 310; 30; 20 INJECTION, SOLUTION INTRAVENOUS at 10:30

## 2024-01-07 RX ADMIN — FENTANYL CITRATE 50 MCG: 50 INJECTION, SOLUTION INTRAMUSCULAR; INTRAVENOUS at 16:46

## 2024-01-07 RX ADMIN — PHENYLEPHRINE HYDROCHLORIDE 200 MCG: 10 INJECTION INTRAVENOUS at 14:33

## 2024-01-07 RX ADMIN — CLINDAMYCIN PHOSPHATE 900 MG: 900 INJECTION, SOLUTION INTRAVENOUS at 13:11

## 2024-01-07 RX ADMIN — PHENYLEPHRINE HYDROCHLORIDE 150 MCG: 10 INJECTION INTRAVENOUS at 14:10

## 2024-01-07 RX ADMIN — Medication 340 ML/HR: at 14:06

## 2024-01-07 RX ADMIN — ONDANSETRON 4 MG: 2 INJECTION INTRAMUSCULAR; INTRAVENOUS at 14:28

## 2024-01-07 RX ADMIN — SODIUM CITRATE AND CITRIC ACID MONOHYDRATE 30 ML: 500; 334 SOLUTION ORAL at 13:37

## 2024-01-07 RX ADMIN — ACETAMINOPHEN 975 MG: 325 TABLET, FILM COATED ORAL at 12:00

## 2024-01-07 RX ADMIN — ACETAMINOPHEN 975 MG: 325 TABLET ORAL at 12:00

## 2024-01-07 RX ADMIN — PHENYLEPHRINE HYDROCHLORIDE 150 MCG: 10 INJECTION INTRAVENOUS at 14:18

## 2024-01-07 RX ADMIN — ONDANSETRON 4 MG: 2 INJECTION INTRAMUSCULAR; INTRAVENOUS at 20:02

## 2024-01-07 RX ADMIN — GENTAMICIN SULFATE 110 MG: 40 INJECTION, SOLUTION INTRAMUSCULAR; INTRAVENOUS at 12:06

## 2024-01-07 RX ADMIN — MORPHINE SULFATE 0.15 MG: 1 INJECTION, SOLUTION EPIDURAL; INTRATHECAL; INTRAVENOUS at 13:47

## 2024-01-07 RX ADMIN — FENTANYL CITRATE 25 MCG: 50 INJECTION, SOLUTION INTRAMUSCULAR; INTRAVENOUS at 15:30

## 2024-01-07 RX ADMIN — PHENYLEPHRINE HYDROCHLORIDE 150 MCG: 10 INJECTION INTRAVENOUS at 14:50

## 2024-01-07 RX ADMIN — PHENYLEPHRINE HYDROCHLORIDE 0.5 MCG/KG/MIN: 10 INJECTION INTRAVENOUS at 14:07

## 2024-01-07 RX ADMIN — Medication 30 ML: at 13:37

## 2024-01-07 RX ADMIN — FENTANYL CITRATE 50 MCG: 50 INJECTION, SOLUTION INTRAMUSCULAR; INTRAVENOUS at 15:54

## 2024-01-07 RX ADMIN — HYDROMORPHONE HYDROCHLORIDE 0.3 MG: 0.2 INJECTION, SOLUTION INTRAMUSCULAR; INTRAVENOUS; SUBCUTANEOUS at 18:26

## 2024-01-07 RX ADMIN — PROPOFOL 150 MG: 10 INJECTION, EMULSION INTRAVENOUS at 14:01

## 2024-01-07 RX ADMIN — SODIUM CHLORIDE, POTASSIUM CHLORIDE, SODIUM LACTATE AND CALCIUM CHLORIDE: 600; 310; 30; 20 INJECTION, SOLUTION INTRAVENOUS at 14:10

## 2024-01-07 RX ADMIN — METOCLOPRAMIDE 10 MG: 5 INJECTION, SOLUTION INTRAMUSCULAR; INTRAVENOUS at 17:58

## 2024-01-07 RX ADMIN — FENTANYL CITRATE 10 MCG: 50 INJECTION INTRAMUSCULAR; INTRAVENOUS at 13:47

## 2024-01-07 ASSESSMENT — ACTIVITIES OF DAILY LIVING (ADL)
ADLS_ACUITY_SCORE: 31
ADLS_ACUITY_SCORE: 31
ADLS_ACUITY_SCORE: 35
ADLS_ACUITY_SCORE: 31
ADLS_ACUITY_SCORE: 35
ADLS_ACUITY_SCORE: 31
ADLS_ACUITY_SCORE: 35

## 2024-01-07 NOTE — ANESTHESIA CARE TRANSFER NOTE
Patient: Maegan German    Procedure: Procedure(s):  REPEAT LOW TRANSVERSE  SECTION       Diagnosis: Chronic hypertension in pregnancy [O10.919]  Diagnosis Additional Information: No value filed.    Anesthesia Type:   General     Note:    Oropharynx: oropharynx clear of all foreign objects  Level of Consciousness: awake  Oxygen Supplementation: room air    Independent Airway: airway patency satisfactory and stable  Dentition: dentition unchanged  Vital Signs Stable: post-procedure vital signs reviewed and stable  Report to RN Given: handoff report given  Patient transferred to: PACU    Handoff Report: Identifed the Patient, Identified the Reponsible Provider, Reviewed the pertinent medical history, Discussed the surgical course, Reviewed Intra-OP anesthesia mangement and issues during anesthesia, Set expectations for post-procedure period and Allowed opportunity for questions and acknowledgement of understanding  Vitals:  Vitals Value Taken Time   /61 24 1515   Temp     Pulse 94 24 1515   Resp     SpO2     Vitals shown include unfiled device data.    Electronically Signed By: JULIAN Huston CRNA  2024  3:19 PM

## 2024-01-07 NOTE — PLAN OF CARE
Goal Outcome Evaluation:      Plan of Care Reviewed With: patient, family           Data: Maegan German transferred to [facility] room 403 via cart at 1715. Baby transferred via parent's arms.  Action: Receiving unit notified of transfer: Yes. Patient and family notified of room change. Report given to GEOFF Bell RN at 1725. Belongings sent to receiving unit. Accompanied by Registered Nurse. Oriented patient to surroundings. Call light within reach. ID bands double-checked with receiving RN.  Response: Patient tolerated transfer and is stable.

## 2024-01-07 NOTE — OP NOTE
Date of Procedure: 24    Preoperative Diagnosis:   1. IUP at 36w5d  2. PPROM  3. PTL  4. CHTN  5. GDMA1  6. Breech malpresentation  7. History of CS x2  8. Cord prolapse  9. Fibroid uterus  10. Desires revision of keloid pfannenstiel scar    Postoperative Diagnosis:   Same as above     Surgeon: Dr. Katlyn Mcmanus MD     Procedure:   1. Repeat Low Transverse  Section   2. Myomectomy  3. Revision of keloid pfannenstiel scar    Anesthesia Staff: Anesthesiologist: Naida Garcia MD  CRNA: Cipriano Stevenson APRN CRNA; Ciera Germain APRN CRNA  OR Staff: Nurse: Regina Feliciano RN; Radha Donovan RN; Melissa Trejo RN  Circulator: Radha Antonio RN  Nurse Practitioner: Heide Mckeon APRN CNP  Scrub Person: Rocio Gutiérrez  Baby : Lydia Gutiérrez RN  NICU Nurse: Erin Humphrey RN           Anesthesia: Spinal and General endotracheal     Antibiotics: gentamycin and clindamycin    Delivery QBL (mL): 220 mL               Total IV Fluids: 1000 mL    Urine Output: 300 mL, clear yellow    Findings: VMI, breech presentation, clear fluid, no nuchal cord , 3-vessel cord, Apgars 6/9 at 1 and 5 minutes respectively, Normal uterus with 8 cm submucosal fibroid, normal tubes and ovaries, Minimal adhesions     Latest Reference Range & Units 24 14:03   Ph Cord Arterial 7.16 - 7.39  7.24   PCO2 Cord Arterial 35 - 71 mm Hg 67   PO2 Cord Arterial 3 - 33 mm Hg 16   Bicarbonate Cord Arterial 16 - 24 mmol/L 29 (H)   Base Excess/Deficit -9.6 - 2.0 mmol/L -0.7   Ph Cord Blood Venous 7.21 - 7.45  7.31   PCO2 Cord Venous 27 - 57 mm Hg 56   PO2 Cord Venous 21 - 37 mm Hg 30   Bicarbonate Cord Venous 16 - 24 mmol/L 28 (H)   Base Excess/Deficit Cord Venous -8.1 - 1.9 mmol/L 0.2   (H): Data is abnormally high           Specimens:   Uterine fibroid 160 g  cord gases  cord blood  placenta                       Complications:  None; patient tolerated the procedure  well.    Indications: 43 year old  at 36w5d weeks, who presented on 2024 for  PPROM. Since presentation she is starting to have ctx as well. Her pregnancy has been complicated by poorly controlled CHTN. She continued on her pre-pregnancy dose of amlodipine 10 mg daily during pregnancy given her CHTN has historically been difficult to control before pregnancy and in previous pregnancies. Her BP had been very well controlled until about 32 weeks. She ultimately was admitted to the hospital for observation and with superimposed preeclampsia ruled out was initiated on labetalol 100 mg BID 2023. She received BMZ during that admission  and . The labetalol was increased to 200 mg BID a few days later upon office follow-up. She was again admitted  to  for BP control with meds adjusted to amlodipine 10 mg daily and now labetalol 300 mg BID. She also has well controlled GDMA1. No vision changes, CP, SOB, RUQ/epigastric pain. No ctx, LOF, VB. +FM.   Otherwise in this pregnancy, she had low risk NIPS for genetic screening - XY aware, she is managing GDM with diet, and EFW by ultrasound was 4#1 (27%ile) at 32 weeks. Fetus is breech and she has planned RLTCS regardless.     She started to experience contractions around 11 am, and initial cervical exam was 30/high and posterior.     Procedure Details:   The patient was seen in the Labor and Delivery Area prior to surgery. The risks, benefits, complications, treatment options, non-operative alternatives, expected recovery and outcomes including failure rates and expected outcomes were discussed with the patient. The possibilities of reaction to medication, pulmonary aspiration, injury to surrounding structures, bleeding, recurrent infections, the need for additional procedures, failure to diagnose a condition, creating a complication requiring transfusion or operation were discussed with the patient. The patient concurred with the proposed  plan, giving informed consent. Pre-operative antibiotics indicated and given within 1 hour of procedure start. Venous thrombosis prophylaxis has been ordered and SCDs applied to bilateral LE.     The patient was taken to the operating room.   The patient was sat up for spinal anesthesia. There was some difficulty with placement and appropriate anesthesia was not initially obtained.   Once patient was laid down to prep and place the urinary catheter, it was noted that the cord was prolapsing from the vagina. An RN inserted her hand and elevated the breech. FHT were noted to be 140 with decels. At this time a stat  section was called. Spinal anesthesia was not adequate and moved toward general anesthesia. During that time she was placed in dorsal supine position with leftward tilt. Walker catheter was in place.  She was then prepped with betadine splash and draped in the usual sterile fashion. A timeout was performed.  General anesthesia by ETT started just prior to surgery start with direct communication with OB.     NICU present for delivery.     A scalpel was then used to make a Pfannenstiel incision along previous scar and carried down to the underlying fascia. A transverse incision was made in the fascia, and the fascial incision was extended transversely. Sharp and blunt dissection was used to separate the rectus muscle from fascia superiorly and inferiorly. The rectus muscles were then divided in the midline and the peritoneum was identified and entered bluntly. The peritoneal incision was extended and the bladder blade was placed for visualization.  A transverse hysterotomy incision was made in the lower uterine segment and extended cephalocaudal and bluntly. The fetus was delivered atraumatically by elevating the breech to the hysterotomy and delivered to the level of the scapula. The right arm was flexed and delivered. The fetus was rotated clockwise and the left arm was delivered in a similar  fashion. The head was flexed and delivered.  The fetus was male. After the umbilical cord was milked, clamped, and cut, the infant was handed to the awaiting  team. The placenta was then expressed.  The uterus was exteriorized. The uterus was then cleared of the remaining clots and debris. A large submucosal fibroid approximately 8 cm was noted within the uterine cavity. The hysterotomy could not be closed. The fibroid was removed with blunt and sharp dissection and sent to pathology.  The hysterotomy incision was then closed with double layer closure using 0 vicryl in a running locking stitch. Hemostasis was obtained with an additional figure of 8 at the midline.. The uterus was replaced into the abdominal cavity. Bilateral pericolic gutters were inspected and cleared of clot and debris. Surgicel powder was applied to the hysterotomy and bladder reflection for slight oozing. The rectus muscles and fascia were inspected and hemostasis was obtained. The peritoneum was closed with 2-0 plain gut. The muscles were reapproximated with inturrupted sutures of 0 vicryl. The fascia was then closed in a running fashion using 0 PDS. The subcutaneous tissue was irrigated and hemostasis was assured.   The keloid scar was sharply excised and discarded. Hemostasis was obtained with cautery. The skin was closed with subcuticular running stitch using 4-0 monocryl. Steristrips and aquacel dressing applied.     Sponge, instrument, and needle counts were correct x2 as reported to the surgeon.    The patient and infant were in stable condition.  She was taken to the PACU in stable condition. Infant went to NBN.      Nida Mcmanus MD  She/Her  [phone]  24 3:11 PM

## 2024-01-07 NOTE — ANESTHESIA POSTPROCEDURE EVALUATION
Patient: Maegan German    Procedure: Procedure(s):  REPEAT LOW TRANSVERSE  SECTION       Anesthesia Type:  General    Note:     Postop Pain Control: Uneventful            Sign Out: Well controlled pain   PONV: No   Neuro/Psych: Uneventful            Sign Out: Acceptable/Baseline neuro status   Airway/Respiratory: Uneventful            Sign Out: Acceptable/Baseline resp. status   CV/Hemodynamics: Uneventful            Sign Out: Acceptable CV status; No obvious hypovolemia; No obvious fluid overload   Other NRE: NONE   DID A NON-ROUTINE EVENT OCCUR? No           Last vitals:  Vitals Value Taken Time   /61 24 1515   Temp     Pulse 94 24 1515   Resp     SpO2     Vitals shown include unfiled device data.    Electronically Signed By: Naida Garcia MD  2024  3:20 PM

## 2024-01-07 NOTE — PROGRESS NOTES
Pt arrived to [facility] with rupture of membranes. Pt is scheduled for csection on 1/18/2024. Dr. Mcmanus updated orders received. Large amount of clear amniotic fluid noted with VE. Lab at bedside for draw. EUM/US monitors on.

## 2024-01-07 NOTE — ANESTHESIA PROCEDURE NOTES
Airway       Patient location during procedure: OR       Procedure Start/Stop Times: 1/7/2024 2:01 PM  Staff -        Performed By: anesthesiologist and CRNA  Consent for Airway        Urgency: elective  Indications and Patient Condition       Indications for airway management: jonatan-procedural       Induction type:RSI       Mask difficulty assessment: 0 - not attempted    Final Airway Details       Final airway type: endotracheal airway       Successful airway: ETT - single  Endotracheal Airway Details        ETT size (mm): 6.5       Cuffed: yes       Successful intubation technique: video laryngoscopy       VL Blade Size: Glidescope 3       Grade View of Cords: 1       Adjucts: stylet       Position: Right       Measured from: gums/teeth       Secured at (cm): 21       Bite block used: None    Post intubation assessment        Placement verified by: capnometry, equal breath sounds and chest rise        Number of attempts at approach: 1       Number of other approaches attempted: 0       Secured with: tape       Ease of procedure: easy       Dentition: Intact and Unchanged    Medication(s) Administered   Medication Administration Time: 1/7/2024 2:01 PM

## 2024-01-07 NOTE — ANESTHESIA PREPROCEDURE EVALUATION
Anesthesia Pre-Procedure Evaluation    Patient: Maegan German   MRN: [id number] : 1980        Procedure : Procedure(s):  REPEAT LOW TRANSVERSE  SECTION          Past Medical History:   Diagnosis Date    Diabetes (H)     GDDC    Hypertension     Chronic: on meds      Past Surgical History:   Procedure Laterality Date     SECTION N/A 3/14/2019    Procedure:  SECTION;  Surgeon: Heide Ravi MD;  Location:  L+D     SECTION N/A 2021    Procedure: REPEAT  SECTION;  Surgeon: Heide Ravi MD;  Location: [facility]    HYPERTENSION MG        Allergies   Allergen Reactions    Penicillins Hives     PN:  HIVES      Social History     Tobacco Use    Smoking status: Never    Smokeless tobacco: Never   Substance Use Topics    Alcohol use: Not Currently      Wt Readings from Last 1 Encounters:   24 54.4 kg (120 lb)        Anesthesia Evaluation   Pt has had prior anesthetic.     No history of anesthetic complications       ROS/MED HX  ENT/Pulmonary:    (-) sleep apnea   Neurologic:  - neg neurologic ROS     Cardiovascular:     (+)  hypertension- -   -  - -                                      METS/Exercise Tolerance:     Hematologic:       Musculoskeletal:       GI/Hepatic:     (+) GERD,                   Renal/Genitourinary:  - neg Renal ROS     Endo:     (+)  type II DM,                    Psychiatric/Substance Use:       Infectious Disease:       Malignancy:       Other:            Physical Exam    Airway        Mallampati: II   TM distance: > 3 FB   Neck ROM: full   Mouth opening: > 3 cm    Respiratory Devices and Support         Dental       (+) Completely normal teeth      Cardiovascular   cardiovascular exam normal          Pulmonary   pulmonary exam normal                OUTSIDE LABS:  CBC:   Lab Results   Component Value Date    WBC 7.5 2024    WBC 7.7 2023    HGB 11.2 (L) 2024    HGB 10.3 (L) 2023    HCT 33.8 (L)  "01/07/2024    HCT 31.3 (L) 12/21/2023     01/07/2024     12/21/2023     BMP:   Lab Results   Component Value Date     01/07/2024     12/21/2023    POTASSIUM 3.2 (L) 01/07/2024    POTASSIUM 3.1 (L) 12/21/2023    CHLORIDE 102 01/07/2024    CHLORIDE 100 12/21/2023    CO2 26 01/07/2024    CO2 27 12/21/2023    BUN 10.0 01/07/2024    BUN 9.5 12/21/2023    CR 0.53 01/07/2024    CR 0.40 (L) 12/21/2023    GLC 98 01/07/2024    GLC 97 01/07/2024     COAGS: No results found for: \"PTT\", \"INR\", \"FIBR\"  POC: No results found for: \"BGM\", \"HCG\", \"HCGS\"  HEPATIC:   Lab Results   Component Value Date    ALBUMIN 3.9 01/07/2024    PROTTOTAL 7.4 01/07/2024    ALT 17 01/07/2024    AST 21 01/07/2024    ALKPHOS 169 (H) 01/07/2024    BILITOTAL 0.2 01/07/2024     OTHER:   Lab Results   Component Value Date    MARIBELL 9.5 01/07/2024       Anesthesia Plan    ASA Status:  2, emergent       Anesthesia Type: Spinal.              Consents    Anesthesia Plan(s) and associated risks, benefits, and realistic alternatives discussed. Questions answered and patient/representative(s) expressed understanding.     - Discussed:     - Discussed with:  Patient            Postoperative Care    Pain management: intrathecal morphine.   PONV prophylaxis: Ondansetron (or other 5HT-3)     Comments:               Naida Garcia MD    I have reviewed the pertinent notes and labs in the chart from the past 30 days and (re)examined the patient.  Any updates or changes from those notes are reflected in this note.    # Hypokalemia: Lowest K = 3.2 mmol/L in last 2 days, will replace as needed          # Drug Induced Platelet Defect: home medication list includes an antiplatelet medication      "

## 2024-01-07 NOTE — H&P
OB Admission History and Physical    HPI:  Patient is a 43 year old female who presents to Labor and Delivery at 36w5d for PPROM. Since presentation she is starting to have ctx as well. Her pregnancy has been complicated by poorly controlled CHTN. She continued on her pre-pregnancy dose of amlodipine 10 mg daily during pregnancy given her CHTN has historically been difficult to control before pregnancy and in previous pregnancies. Her BP had been very well controlled until about 32 weeks. She ultimately was admitted to the hospital for observation and with superimposed preeclampsia ruled out was initiated on labetalol 100 mg BID 2023. She received BMZ during that admission  and . The labetalol was increased to 200 mg BID a few days later upon office follow-up. She was again admitted  to  for BP control with meds adjusted to amlodipine 10 mg daily and now labetalol 300 mg BID. She also has well controlled GDMA1. No vision changes, CP, SOB, RUQ/epigastric pain. No ctx, LOF, VB. +FM.   Otherwise in this pregnancy, she had low risk NIPS for genetic screening - XY aware, she is managing GDM with diet, and EFW by ultrasound was 4#1 (27%ile) at 32 weeks. Fetus is breech and she has planned RLTCS regardless.     Obstetrical History:  G1 early SAB  G2 PPROM/PTL at 36+5, elective PLTCS  G3 followed by MFM for FGR, RLTCS at 38 weeks due to CHTN and FGR    Patient Active Problem List   Diagnosis    Indication for care in labor or delivery    S/P  section    Pregnancy affected by fetal growth restriction    S/P     Chronic hypertension    Encounter for triage in pregnant patient       Prior      Prenatal Lab Results:      Latest Reference Range & Units 23 11:02   ABO (External)  O (E)   Rh (External)  POS (E)   Hepatitis B Surface Antigen (External) Negative  NEG (E)   HIV 1&2 Antibody (External) Non Reactive  Non Reactive (E)   Rapid Plasma Reagin (External) Non Reactive  Non  Reactive (E)   Rubella Antibody IgG (External) >0.99 index 0.97 (L) (E)   (L): Data is abnormally low  (E): External lab result        OB History    Para Term  AB Living   4 2 1 1 1 2   SAB IAB Ectopic Multiple Live Births   1 0 0 0 2      # Outcome Date GA Lbr Eduardo/2nd Weight Sex Delivery Anes PTL Lv   4 Current            3 Term 21 38w5d  2.25 kg (4 lb 15.4 oz) F    RYANNE      Name: BELINDA EDEN      Apgar1: 8  Apgar5: 9   2  19 36w5d  2.78 kg (6 lb 2.1 oz) F    RYANNE      Name: BELINDA EDEN      Apgar1: 9  Apgar5: 9   1 SAB                Past Surgical History:   Procedure Laterality Date     SECTION N/A 3/14/2019    Procedure:  SECTION;  Surgeon: Heide Ravi MD;  Location:  L+D     SECTION N/A 2021    Procedure: REPEAT  SECTION;  Surgeon: Heide Ravi MD;  Location:  L+D    HYPERTENSION MG         Past Medical History:   Diagnosis Date    Diabetes (H)     GDDC    Hypertension     Chronic: on meds       Social History     Socioeconomic History    Marital status:      Spouse name: None    Number of children: None    Years of education: None    Highest education level: None   Tobacco Use    Smoking status: Never    Smokeless tobacco: Never   Substance and Sexual Activity    Alcohol use: Not Currently    Drug use: No    Sexual activity: Yes     Partners: Male       Medications:    Medications Prior to Admission   Medication Sig Dispense Refill Last Dose    amLODIPine (NORVASC) 10 MG tablet Take 10 mg by mouth daily   2024    aspirin 81 MG EC tablet Take 81 mg by mouth daily   2024    ferrous sulfate dried 160 (50 Fe) MG tablet Take 1 tablet by mouth daily (with breakfast)   2024    folic acid (FOLVITE) 400 MCG tablet Take 400 mcg by mouth daily   2024    labetalol (NORMODYNE) 300 MG tablet Take 1 tablet (300 mg) by mouth every 12 hours 45 tablet 0 2024    Prenatal Vit-Fe Fumarate-FA (PNV  "PRENATAL PLUS MULTIVITAMIN) 27-1 MG TABS per tablet Take 1 tablet by mouth daily   2024       Allergies:    Penicillins    Review of Systems:  A comprehensive review of systems was negative except for those noted in HPI    Physical Examination:  Current Inpatient Vital Signs: Blood pressure (!) 148/91, temperature 97.9  F (36.6  C), temperature source Temporal, resp. rate 16, height 1.499 m (4' 11\"), weight 54.4 kg (120 lb), last menstrual period 2023, unknown if currently breastfeeding.    General:  NAD, A/Ox3  Lungs:  normal effort, no increased work of breathing  Heart:  regular rate  Abdomen:  gravid, consistent with 36w5d   Estimated Fetal Weight: 2800 g  Extremities:  no edema, nontender  Skin:  normal    Cervical Exam: 1 cm per RN    CT bpm, Variability: Moderate (6 - 25 bpm), Accelerations: Present and Decelerations: Absent  CTX: 5 min  Category 1     Latest Reference Range & Units 24 10:39   Sodium 135 - 145 mmol/L 139   Potassium 3.4 - 5.3 mmol/L 3.2 (L)   Chloride 98 - 107 mmol/L 102   Carbon Dioxide (CO2) 22 - 29 mmol/L 26   Urea Nitrogen 6.0 - 20.0 mg/dL 10.0   Creatinine 0.51 - 0.95 mg/dL 0.53   GFR Estimate >60 mL/min/1.73m2 >90   Calcium 8.6 - 10.0 mg/dL 9.5   Anion Gap 7 - 15 mmol/L 11   Albumin 3.5 - 5.2 g/dL 3.9   Protein Total 6.4 - 8.3 g/dL 7.4   Alkaline Phosphatase 40 - 150 U/L 169 (H)   ALT 0 - 50 U/L 17   AST 0 - 45 U/L 21   Bilirubin Total <=1.2 mg/dL 0.2   Glucose 70 - 99 mg/dL 97   WBC 4.0 - 11.0 10e3/uL 7.5   Hemoglobin 11.7 - 15.7 g/dL 11.2 (L)   Hematocrit 35.0 - 47.0 % 33.8 (L)   Platelet Count 150 - 450 10e3/uL 229   RBC Count 3.80 - 5.20 10e6/uL 3.98   MCV 78 - 100 fL 85   MCH 26.5 - 33.0 pg 28.1   MCHC 31.5 - 36.5 g/dL 33.1   RDW 10.0 - 15.0 % 15.1 (H)   (L): Data is abnormally low  (H): Data is abnormally high     Latest Reference Range & Units 24 12:21   GLUCOSE BY METER POCT 70 - 99 mg/dL 98       Assessment/Plan:  , intrauterine pregnancy at " 36w5d with MEÑO of 2024, by Last Menstrual Period     Admit to Labor and Delivery for unscheduled  section due to PPROM/PTL.  S/p course of BMZ  and . Repeat course not indicated - immediate delivery indicated, and pt with GDM.   CHTN: BP in mild range. continue home medications - amlodipine and labetalol. Labs WNL. Denies s/s of preE.   GDMA1: monitor BG per protocol.   GBS unknown - still pending in clinic from 1/3.  Prophylaxis: for surgical indications  Rubella non-immune. Needs MMR prior to discharge.  Blood type O POS no need for Rhogam  Hx of CS x2, and current breech malpresentation - plan for repeat  section  Desires revision of keloid scar        Nida Mcmanus MD  She/Her  [phone]  24 11:09 AM

## 2024-01-08 LAB
GLUCOSE BLDC GLUCOMTR-MCNC: 89 MG/DL (ref 70–99)
HGB BLD-MCNC: 10.3 G/DL (ref 11.7–15.7)
T PALLIDUM AB SER QL: NONREACTIVE

## 2024-01-08 PROCEDURE — 36415 COLL VENOUS BLD VENIPUNCTURE: CPT | Performed by: OBSTETRICS & GYNECOLOGY

## 2024-01-08 PROCEDURE — 120N000012 HC R&B POSTPARTUM

## 2024-01-08 PROCEDURE — 85018 HEMOGLOBIN: CPT | Performed by: OBSTETRICS & GYNECOLOGY

## 2024-01-08 PROCEDURE — 250N000011 HC RX IP 250 OP 636: Performed by: OBSTETRICS & GYNECOLOGY

## 2024-01-08 PROCEDURE — 250N000013 HC RX MED GY IP 250 OP 250 PS 637: Performed by: OBSTETRICS & GYNECOLOGY

## 2024-01-08 RX ORDER — OXYCODONE HYDROCHLORIDE 5 MG/1
5-10 TABLET ORAL EVERY 4 HOURS PRN
Status: DISCONTINUED | OUTPATIENT
Start: 2024-01-08 | End: 2024-01-10

## 2024-01-08 RX ADMIN — OXYCODONE HYDROCHLORIDE 10 MG: 5 TABLET ORAL at 15:32

## 2024-01-08 RX ADMIN — OXYCODONE HYDROCHLORIDE 5 MG: 5 TABLET ORAL at 03:32

## 2024-01-08 RX ADMIN — NALBUPHINE HYDROCHLORIDE 2.5 MG: 20 INJECTION, SOLUTION INTRAMUSCULAR; INTRAVENOUS; SUBCUTANEOUS at 00:23

## 2024-01-08 RX ADMIN — ACETAMINOPHEN 975 MG: 325 TABLET, FILM COATED ORAL at 06:09

## 2024-01-08 RX ADMIN — OXYCODONE HYDROCHLORIDE 5 MG: 5 TABLET ORAL at 11:43

## 2024-01-08 RX ADMIN — ACETAMINOPHEN 975 MG: 325 TABLET, FILM COATED ORAL at 17:34

## 2024-01-08 RX ADMIN — IBUPROFEN 800 MG: 400 TABLET ORAL at 15:32

## 2024-01-08 RX ADMIN — IBUPROFEN 800 MG: 400 TABLET ORAL at 21:44

## 2024-01-08 RX ADMIN — SIMETHICONE 80 MG: 80 TABLET, CHEWABLE ORAL at 20:24

## 2024-01-08 RX ADMIN — OXYCODONE HYDROCHLORIDE 10 MG: 5 TABLET ORAL at 20:24

## 2024-01-08 RX ADMIN — SENNOSIDES AND DOCUSATE SODIUM 2 TABLET: 50; 8.6 TABLET ORAL at 20:24

## 2024-01-08 RX ADMIN — KETOROLAC TROMETHAMINE 30 MG: 30 INJECTION, SOLUTION INTRAMUSCULAR; INTRAVENOUS at 09:08

## 2024-01-08 RX ADMIN — OXYCODONE HYDROCHLORIDE 5 MG: 5 TABLET ORAL at 07:40

## 2024-01-08 RX ADMIN — ACETAMINOPHEN 975 MG: 325 TABLET, FILM COATED ORAL at 00:22

## 2024-01-08 RX ADMIN — AMLODIPINE BESYLATE 10 MG: 10 TABLET ORAL at 09:05

## 2024-01-08 RX ADMIN — DOCUSATE SODIUM 50 MG AND SENNOSIDES 8.6 MG 1 TABLET: 8.6; 5 TABLET, FILM COATED ORAL at 07:39

## 2024-01-08 RX ADMIN — KETOROLAC TROMETHAMINE 30 MG: 30 INJECTION, SOLUTION INTRAMUSCULAR; INTRAVENOUS at 03:32

## 2024-01-08 RX ADMIN — ACETAMINOPHEN 975 MG: 325 TABLET, FILM COATED ORAL at 11:43

## 2024-01-08 ASSESSMENT — ACTIVITIES OF DAILY LIVING (ADL)
ADLS_ACUITY_SCORE: 34
ADLS_ACUITY_SCORE: 35
ADLS_ACUITY_SCORE: 34
ADLS_ACUITY_SCORE: 34
ADLS_ACUITY_SCORE: 21
ADLS_ACUITY_SCORE: 34
ADLS_ACUITY_SCORE: 35
ADLS_ACUITY_SCORE: 21
ADLS_ACUITY_SCORE: 35
ADLS_ACUITY_SCORE: 34
ADLS_ACUITY_SCORE: 34
ADLS_ACUITY_SCORE: 21

## 2024-01-08 NOTE — PROGRESS NOTES
"OB Progress Note    A/P:  Pt is a 43 year old  POD#1 s/p PLTCS and myomectomy at 36w5d for PPROM/PTL with breech malpresentation, delivery complicated by cord prolapse.     1. Continue routine post-op and post-partum care. Pain managed well.  2. CHTN: BP normotensive. Labetalol stopped after delivery. Continue amlodipine (preprengnacy medication), and will adjust PRN.   a. Okay to remove IV due to pt discomfort. She is aware that if BP spikes she will require new placement.   3. GDMA1: fasting BG 89 this AM. Plan for 2 hr GTT at 6-12 weeks PP.  4. VMI (Jack) in room with mother. Feeding by pumping and bottle feeding  5. Blood type: O POS Rhogam as needed per baby blood type  6. Rubella non-immune. Needs MMR prior to discharge.  7. Will plan for discharge home POD#3 pending BP control.    Full Code    S:  The patient did well overnight. There were no acute issues. Her pain is well controlled, though notices it more on the right. She notes appropriate lochia. She is tolerating a regular diet well without nausea or vomiting. She has ambulated. She is voiding without difficulty. She denies dizziness, lightheadedness, headache, vision changes, chest pain, shortness of breath, RUQ pain, calf pain, or other complaints on ROS.    O: /81 (BP Location: Left arm, Patient Position: Semi-Ramirez's, Cuff Size: Adult Regular)   Pulse 91   Temp 97.8  F (36.6  C) (Oral)   Resp 16   Ht 1.499 m (4' 11\")   Wt 53.7 kg (118 lb 6.4 oz)   LMP 2023   SpO2 99%   Breastfeeding Unknown   BMI 23.91 kg/m      GEN: A/Ox3, NAD  CV: regular rate   Lungs: normal effort  Abd: Appropriately tender, + BS, bandage clean/dry/intact, uterus firm, below umbilicus  LE: No swelling or pain    Lab Results   Component Value Date    WBC 7.5 2024    HGB 10.3 (L) 2024    HCT 33.8 (L) 2024    MCV 85 2024     2024         Nida Mcmanus MD  She/Her  [phone]  24 6:28 AM    Text Page (8am - " 12pm)

## 2024-01-08 NOTE — PLAN OF CARE
Goal Outcome Evaluation:      Plan of Care Reviewed With: patient    Overall Patient Progress: improvingOverall Patient Progress: improving    Vital signs stable. Patient denied feeling any headache, nausea, vision changes, and/or epigastric pain. Postpartum assessment WDL. Incision dressing clean, dry, and intact. Pain controlled with tylenol, ibuprofen, and oxycodone. Patient ambulating in room and hallway. Patient passing gas. Baby bottle feed. Encouraged Patient to pump every 3 hours. Patient and infant bonding well. Will continue with current plan of care.

## 2024-01-08 NOTE — PLAN OF CARE
Updated to Dr. Mcmanus that Pt still rating her pain 6/10 and Pt was wondering if Dr. Mcmanus can increase oxycodone to 10 mg. Order received: Oxycodone 5-10 mg every 4 hours as needed.

## 2024-01-08 NOTE — PLAN OF CARE
Goal Outcome Evaluation:      Plan of Care Reviewed With: patient    Overall Patient Progress: improvingOverall Patient Progress: improving     Vital signs stable. Postpartum assessment WDL. Incision has a scant marked drainage on aquacel. Pain controlled with tylenol, toradol and oxycodone. Nausea resolved. Patient ambulating with stand by assist and voided x1. Patient passing gas. Pumping and bottle feeding donor milk. Patient and infant bonding well. Will continue with current plan of care.

## 2024-01-08 NOTE — LACTATION NOTE
Initial visit with Selam and baby Jack.  Mother is pumping and using the 24mm flange, Instructed to apply nipple cream prior to pumping.    Advised to Pump 8-12x/day.  Explained benefits of holding and skin to skin.  Encouraged lots of skin to skin. Instructed on hand expression. Spoons and lomax cup given .  Shown and reviewed breast milk storage chart in family book.    Continues to nurse well per mom. No further questions at this time.   Will follow as needed.   Roxanna HEWITTN, RN, PHN, RNC-MNN, IBCLC

## 2024-01-08 NOTE — PLAN OF CARE
Goal Outcome Evaluation:      Plan of Care Reviewed With: patient, spouse    Overall Patient Progress: improvingOverall Patient Progress: improving     Patient pain is being managed with scheduled tylenol, PRN dilaudid and oxycodone. Incision dressing site is clean, dry, and intact. Walker is intact and draining clear urine. Patient is drinking well, aware to continue to increase fluids. Saline locked.  Patient is on current bedrest. Fundus is firm with minimal bleeding present. Bonding well with . Plan of care continues.

## 2024-01-09 LAB
PATH REPORT.COMMENTS IMP SPEC: NORMAL
PATH REPORT.COMMENTS IMP SPEC: NORMAL
PATH REPORT.FINAL DX SPEC: NORMAL
PATH REPORT.GROSS SPEC: NORMAL
PATH REPORT.MICROSCOPIC SPEC OTHER STN: NORMAL
PATH REPORT.RELEVANT HX SPEC: NORMAL
PHOTO IMAGE: NORMAL

## 2024-01-09 PROCEDURE — 250N000013 HC RX MED GY IP 250 OP 250 PS 637: Performed by: OBSTETRICS & GYNECOLOGY

## 2024-01-09 PROCEDURE — 120N000012 HC R&B POSTPARTUM

## 2024-01-09 PROCEDURE — 250N000013 HC RX MED GY IP 250 OP 250 PS 637: Performed by: STUDENT IN AN ORGANIZED HEALTH CARE EDUCATION/TRAINING PROGRAM

## 2024-01-09 RX ORDER — CYCLOBENZAPRINE HCL 5 MG
10 TABLET ORAL 3 TIMES DAILY
Status: DISCONTINUED | OUTPATIENT
Start: 2024-01-09 | End: 2024-01-10

## 2024-01-09 RX ORDER — HYDROMORPHONE HYDROCHLORIDE 1 MG/ML
0.3 INJECTION, SOLUTION INTRAMUSCULAR; INTRAVENOUS; SUBCUTANEOUS
Status: DISCONTINUED | OUTPATIENT
Start: 2024-01-09 | End: 2024-01-09

## 2024-01-09 RX ORDER — HYDROMORPHONE HYDROCHLORIDE 2 MG/1
2 TABLET ORAL
Status: DISCONTINUED | OUTPATIENT
Start: 2024-01-09 | End: 2024-01-12 | Stop reason: HOSPADM

## 2024-01-09 RX ADMIN — ACETAMINOPHEN 975 MG: 325 TABLET, FILM COATED ORAL at 06:09

## 2024-01-09 RX ADMIN — CYCLOBENZAPRINE HYDROCHLORIDE 10 MG: 5 TABLET, FILM COATED ORAL at 21:32

## 2024-01-09 RX ADMIN — OXYCODONE HYDROCHLORIDE 10 MG: 5 TABLET ORAL at 13:30

## 2024-01-09 RX ADMIN — IBUPROFEN 800 MG: 400 TABLET ORAL at 03:57

## 2024-01-09 RX ADMIN — IBUPROFEN 800 MG: 400 TABLET ORAL at 21:31

## 2024-01-09 RX ADMIN — ACETAMINOPHEN 975 MG: 325 TABLET, FILM COATED ORAL at 12:13

## 2024-01-09 RX ADMIN — SENNOSIDES AND DOCUSATE SODIUM 2 TABLET: 50; 8.6 TABLET ORAL at 21:32

## 2024-01-09 RX ADMIN — SENNOSIDES AND DOCUSATE SODIUM 2 TABLET: 50; 8.6 TABLET ORAL at 07:59

## 2024-01-09 RX ADMIN — BENZOCAINE 6 MG-MENTHOL 10 MG LOZENGES 1 LOZENGE: at 15:15

## 2024-01-09 RX ADMIN — HYDROMORPHONE HYDROCHLORIDE 2 MG: 2 TABLET ORAL at 21:32

## 2024-01-09 RX ADMIN — HYDROMORPHONE HYDROCHLORIDE 2 MG: 2 TABLET ORAL at 18:08

## 2024-01-09 RX ADMIN — OXYCODONE HYDROCHLORIDE 10 MG: 5 TABLET ORAL at 09:24

## 2024-01-09 RX ADMIN — ACETAMINOPHEN 975 MG: 325 TABLET, FILM COATED ORAL at 00:08

## 2024-01-09 RX ADMIN — OXYCODONE HYDROCHLORIDE 10 MG: 5 TABLET ORAL at 04:05

## 2024-01-09 RX ADMIN — CYCLOBENZAPRINE HYDROCHLORIDE 10 MG: 5 TABLET, FILM COATED ORAL at 12:55

## 2024-01-09 RX ADMIN — CYCLOBENZAPRINE HYDROCHLORIDE 10 MG: 5 TABLET, FILM COATED ORAL at 15:46

## 2024-01-09 RX ADMIN — IBUPROFEN 800 MG: 400 TABLET ORAL at 09:24

## 2024-01-09 RX ADMIN — IBUPROFEN 800 MG: 400 TABLET ORAL at 15:46

## 2024-01-09 RX ADMIN — OXYCODONE HYDROCHLORIDE 10 MG: 5 TABLET ORAL at 00:08

## 2024-01-09 RX ADMIN — AMLODIPINE BESYLATE 10 MG: 10 TABLET ORAL at 07:59

## 2024-01-09 RX ADMIN — ACETAMINOPHEN 975 MG: 325 TABLET, FILM COATED ORAL at 18:05

## 2024-01-09 ASSESSMENT — ACTIVITIES OF DAILY LIVING (ADL)
ADLS_ACUITY_SCORE: 21
ADLS_ACUITY_SCORE: 18
ADLS_ACUITY_SCORE: 21
ADLS_ACUITY_SCORE: 18
ADLS_ACUITY_SCORE: 21

## 2024-01-09 NOTE — PROGRESS NOTES
"OB Progress Note    A/P:  Pt is a 43 year old  POD#2 s/p PLTCS and myomectomy at 36w5d for PPROM/PTL with breech malpresentation, delivery complicated by cord prolapse.     1. Continue routine post-op and post-partum care.  a. Pain: ordered flexeril TID and PO diluadid PRN (pt no longer has IV) to get pain under better control and plan for transition back to PO oxy prior to discharge  2. CHTN: BP normotensive to mild range. Labetalol stopped after delivery. Continue amlodipine (preprengnacy medication), and will adjust PRN.   a. Okay to remove IV due to pt discomfort. She is aware that if BP spikes she will require new placement.   3. GDMA1: fasting BG 89 postpartum. Plan for 2 hr GTT at 6-12 weeks PP.  4. VMI (Jack) in room with mother. Feeding by pumping and bottle feeding  5. Blood type: O POS Rhogam as needed per baby blood type  6. Rubella non-immune. Needs MMR prior to discharge.  7. Will plan for discharge home POD#3 pending BP control and pain control.    Full Code    S:  The patient did well overnight. There were no acute issues. This morning and afternoon her pain has not been well controlled with ibuprofen/tylenol/oxy PO. Notes a tearing sensation at incision and increased cramping. No other symptoms. Able to get up to go to bathroom but feels pain is more significant and likely will not be able to shower tonight due to pain  She notes appropriate lochia. She is tolerating a regular diet well without nausea or vomiting. She has ambulated. She is voiding without difficulty. She denies dizziness, lightheadedness, headache, vision changes, chest pain, shortness of breath, RUQ pain, calf pain, or other complaints on ROS.    O: /82 (BP Location: Left arm, Patient Position: Semi-Ramirez's, Cuff Size: Adult Regular)   Pulse 81   Temp 98  F (36.7  C)   Resp 18   Ht 1.499 m (4' 11\")   Wt 53.2 kg (117 lb 4.8 oz)   LMP 2023   SpO2 99%   Breastfeeding Unknown   BMI 23.69 kg/m      GEN: " A/Ox3, NAD  CV: regular rate   Lungs: normal effort  Abd: Appropriately tender, + BS, bandage clean/dry/intact, uterus firm, below umbilicus  LE: No swelling or pain    Lab Results   Component Value Date    WBC 7.5 01/07/2024    HGB 10.3 (L) 01/08/2024    HCT 33.8 (L) 01/07/2024    MCV 85 01/07/2024     01/07/2024       Jeri Ibanez MD  [facility]  01/09/24 8:41 AM

## 2024-01-09 NOTE — PLAN OF CARE
Goal Outcome Evaluation:  Fundus firm and bleeding wnl.  VSS except elevated BPs 130s/80-90s. +reflexes, no clonus. No complaints of epigastric pain, headaches or vision changes. Incision covered with aquacel CDI.  Patient c/o increased pain, taking scheduled tylenol, ibuprofen and oxycodone as needed.  Up independently. Strict I/Os. Pumping Q3H independently. Using abdominal binder.  Passing flatus and tolerating regular diet.  Encouraged to call with questions or concerns.  Will continue with plan of care.      Plan of Care Reviewed With: patient  Sunita Salcido RN on 1/9/2024 at 5:10 AM

## 2024-01-09 NOTE — PLAN OF CARE
VSS on RA, hx of CHTN. Pain at 7/10 while sitting, states a burning pain at incision site. Given ibuprofen, flexeril, oral dilaudid, and tylenol. Up independently, voiding well. I/O, clonus, reflexes d/t CHTN, all WDL. Denies pre-e symptoms. Bonding well with infant, pumping and bottling 25 mL of HDM. Postpartum assessment WDL, fundus firm, U/1, scant flow, midline, no clots. Given a lozenge for sore throat s/p general anesthesia from labor. Continuing with POC.

## 2024-01-09 NOTE — PLAN OF CARE
Goal Outcome Evaluation:      Plan of Care Reviewed With: patient    Overall Patient Progress: no changeOverall Patient Progress: no change  VSS Pt rates pain a 7/8 out of 10. Using Ibuprofen, tylenol, 10 mg of oxycodone. Alternating between ice and heat to her abdomen. Also using abdominal binder when up. MD called for alternative pain medication. Flexeril ordered and given. Voiding in good amounts. Tolerating a regular diet. Pumping every 3 hours and getting drops of colostrum. Will continue to monitor.

## 2024-01-10 PROCEDURE — 90471 IMMUNIZATION ADMIN: CPT | Performed by: OBSTETRICS & GYNECOLOGY

## 2024-01-10 PROCEDURE — 120N000012 HC R&B POSTPARTUM

## 2024-01-10 PROCEDURE — 250N000013 HC RX MED GY IP 250 OP 250 PS 637: Performed by: STUDENT IN AN ORGANIZED HEALTH CARE EDUCATION/TRAINING PROGRAM

## 2024-01-10 PROCEDURE — 250N000013 HC RX MED GY IP 250 OP 250 PS 637: Performed by: OBSTETRICS & GYNECOLOGY

## 2024-01-10 PROCEDURE — 250N000011 HC RX IP 250 OP 636: Performed by: OBSTETRICS & GYNECOLOGY

## 2024-01-10 PROCEDURE — 90707 MMR VACCINE SC: CPT | Performed by: OBSTETRICS & GYNECOLOGY

## 2024-01-10 RX ORDER — OXYCODONE HYDROCHLORIDE 5 MG/1
10 TABLET ORAL
Status: DISCONTINUED | OUTPATIENT
Start: 2024-01-10 | End: 2024-01-10

## 2024-01-10 RX ORDER — LIDOCAINE 4 G/G
1 PATCH TOPICAL
Status: DISCONTINUED | OUTPATIENT
Start: 2024-01-10 | End: 2024-01-12 | Stop reason: HOSPADM

## 2024-01-10 RX ORDER — PREGABALIN 25 MG/1
25 CAPSULE ORAL 3 TIMES DAILY
Status: DISCONTINUED | OUTPATIENT
Start: 2024-01-10 | End: 2024-01-12 | Stop reason: HOSPADM

## 2024-01-10 RX ADMIN — SENNOSIDES AND DOCUSATE SODIUM 2 TABLET: 50; 8.6 TABLET ORAL at 20:40

## 2024-01-10 RX ADMIN — HYDROMORPHONE HYDROCHLORIDE 2 MG: 2 TABLET ORAL at 06:39

## 2024-01-10 RX ADMIN — ACETAMINOPHEN 975 MG: 325 TABLET, FILM COATED ORAL at 06:39

## 2024-01-10 RX ADMIN — HYDROMORPHONE HYDROCHLORIDE 2 MG: 2 TABLET ORAL at 17:07

## 2024-01-10 RX ADMIN — AMLODIPINE BESYLATE 10 MG: 10 TABLET ORAL at 08:05

## 2024-01-10 RX ADMIN — CYCLOBENZAPRINE HYDROCHLORIDE 10 MG: 5 TABLET, FILM COATED ORAL at 08:54

## 2024-01-10 RX ADMIN — HYDROMORPHONE HYDROCHLORIDE 2 MG: 2 TABLET ORAL at 11:03

## 2024-01-10 RX ADMIN — HYDROMORPHONE HYDROCHLORIDE 2 MG: 2 TABLET ORAL at 20:40

## 2024-01-10 RX ADMIN — ACETAMINOPHEN 975 MG: 325 TABLET, FILM COATED ORAL at 18:04

## 2024-01-10 RX ADMIN — IBUPROFEN 800 MG: 400 TABLET ORAL at 21:54

## 2024-01-10 RX ADMIN — OXYCODONE HYDROCHLORIDE 10 MG: 5 TABLET ORAL at 14:07

## 2024-01-10 RX ADMIN — HYDROMORPHONE HYDROCHLORIDE 2 MG: 2 TABLET ORAL at 00:26

## 2024-01-10 RX ADMIN — HYDROMORPHONE HYDROCHLORIDE 2 MG: 2 TABLET ORAL at 03:35

## 2024-01-10 RX ADMIN — PREGABALIN 25 MG: 25 CAPSULE ORAL at 22:27

## 2024-01-10 RX ADMIN — ACETAMINOPHEN 975 MG: 325 TABLET, FILM COATED ORAL at 00:25

## 2024-01-10 RX ADMIN — ACETAMINOPHEN 975 MG: 325 TABLET, FILM COATED ORAL at 11:56

## 2024-01-10 RX ADMIN — DOCUSATE SODIUM 50 MG AND SENNOSIDES 8.6 MG 1 TABLET: 8.6; 5 TABLET, FILM COATED ORAL at 08:05

## 2024-01-10 RX ADMIN — IBUPROFEN 800 MG: 400 TABLET ORAL at 09:12

## 2024-01-10 RX ADMIN — IBUPROFEN 800 MG: 400 TABLET ORAL at 15:03

## 2024-01-10 RX ADMIN — MEASLES, MUMPS, AND RUBELLA VIRUS VACCINE LIVE 0.5 ML: 1000; 12500; 1000 INJECTION, POWDER, LYOPHILIZED, FOR SUSPENSION SUBCUTANEOUS at 08:52

## 2024-01-10 RX ADMIN — CYCLOBENZAPRINE HYDROCHLORIDE 10 MG: 5 TABLET, FILM COATED ORAL at 15:58

## 2024-01-10 RX ADMIN — LIDOCAINE 1 PATCH: 4 PATCH TOPICAL at 10:13

## 2024-01-10 RX ADMIN — IBUPROFEN 800 MG: 400 TABLET ORAL at 03:35

## 2024-01-10 ASSESSMENT — ACTIVITIES OF DAILY LIVING (ADL)
ADLS_ACUITY_SCORE: 18

## 2024-01-10 NOTE — PLAN OF CARE
Goal Outcome Evaluation:      Plan of Care Reviewed With: patient    Overall Patient Progress: no change  Pt's is taking Dilaudid/Ibuprofen/Tylenol and Lidocaine patch added today-without much improvement. Will try Oxycodone again next dose. Denies headache/blurred vision/and epigastric pain. Up in room and encouraged to walk in the hallways. Abdominal binder on. Pumping every three hours. Will continue to monitor.

## 2024-01-10 NOTE — PLAN OF CARE
Goal Outcome Evaluation:      Plan of Care Reviewed With: patient    Overall Patient Progress: no changeOverall Patient Progress: no change            VSS except for borderline BPs 117-135/77-85, FF, scant bleeding, voiding ok, + flatus, tolerating a regular diet, and up ad kaylyn. Denies HA, vision changes, or RUQ pain. Incision is CDI. Pain is remains a 7/10 even with getting Dilaudid, Tylenol, Ibuprofen, and Flexeril around the clock. Ice, heat, and abdominal binder trailed throughout the shift with no improvement in pain. She is pumping for her son and giving EBM and HDM through a bottle. All questions and concerns answered by this RN. Will continue to monitor.

## 2024-01-10 NOTE — PROGRESS NOTES
"OB Progress Note    A/P:  Pt is a 43 year old  POD#3 s/p PLTCS and myomectomy at 36w5d for PPROM/PTL with breech malpresentation, delivery complicated by cord prolapse.     Continue routine post-op and post-partum care.  Pain: pt continues to complain of significant abdominal and incisional pain despite flexeril TID and PO diluadid PRN. Plan to transition back to oxycodone. Plan for lidocaine patch over the right lateral abdomen where pain is most significant on exam. Consider Lyrica for incisional pain if continues despite the lidocaine patch.  2. CHTN: BP normotensive overnight. Labetalol stopped after delivery. Continue amlodipine (preprengnacy medication), and will adjust PRN.    3. GDMA1: fasting BG 89 postpartum. Plan for 2 hr GTT at 6-12 weeks PP.  4. VMI (Jack) in room with mother. Feeding by pumping and bottle feeding  5. Blood type: O POS Rhogam as needed per baby blood type  6. Rubella non-immune. Needs MMR prior to discharge.  7. Will plan for discharge home POD#4 pending pain control.    Full Code    S:  The patient continues to struggle with pain and needing assistance. She is a little better with the dilaudid and flexeril but still rates pain 7/10. Worse with movement. She has not yet had a shower because of the pain. She notes appropriate lochia. She is tolerating a regular diet well without nausea or vomiting. She has ambulated but with pain as noted above. She is voiding without difficulty. Passing Flatus but not yet had a BM. She denies dizziness, lightheadedness, headache, vision changes, chest pain, shortness of breath, RUQ pain, calf pain, or other complaints on ROS.    O: /80 (BP Location: Left arm, Patient Position: Semi-Ramirez's, Cuff Size: Adult Regular)   Pulse 91   Temp 98  F (36.7  C) (Oral)   Resp 16   Ht 1.499 m (4' 11\")   Wt 52.5 kg (115 lb 11.9 oz)   LMP 2023   SpO2 99%   Breastfeeding Unknown   BMI 23.38 kg/m      GEN: A/Ox3, NAD  CV: regular rate "   Lungs: normal effort  Abd: Tender through out the abdomen with greatest tenderness RLQ, abdomen is soft, bandage clean/dry/intact, uterus firm, below umbilicus  LE: No swelling or pain    Lab Results   Component Value Date    WBC 7.5 01/07/2024    HGB 10.3 (L) 01/08/2024    HCT 33.8 (L) 01/07/2024    MCV 85 01/07/2024     01/07/2024       Tao Zamorano MD

## 2024-01-11 PROBLEM — O10.919 CHRONIC HYPERTENSION AFFECTING PREGNANCY: Status: ACTIVE | Noted: 2024-01-11

## 2024-01-11 PROCEDURE — 120N000012 HC R&B POSTPARTUM

## 2024-01-11 PROCEDURE — 250N000013 HC RX MED GY IP 250 OP 250 PS 637: Performed by: OBSTETRICS & GYNECOLOGY

## 2024-01-11 PROCEDURE — 250N000013 HC RX MED GY IP 250 OP 250 PS 637: Performed by: STUDENT IN AN ORGANIZED HEALTH CARE EDUCATION/TRAINING PROGRAM

## 2024-01-11 RX ORDER — PREGABALIN 25 MG/1
25 CAPSULE ORAL 3 TIMES DAILY
Qty: 30 CAPSULE | Refills: 0 | Status: SHIPPED | OUTPATIENT
Start: 2024-01-11 | End: 2024-01-12

## 2024-01-11 RX ORDER — HYDROMORPHONE HYDROCHLORIDE 2 MG/1
2 TABLET ORAL
Qty: 30 TABLET | Refills: 0 | Status: SHIPPED | OUTPATIENT
Start: 2024-01-11

## 2024-01-11 RX ORDER — POLYETHYLENE GLYCOL 3350 17 G/17G
17 POWDER, FOR SOLUTION ORAL 2 TIMES DAILY PRN
Status: DISCONTINUED | OUTPATIENT
Start: 2024-01-11 | End: 2024-01-12 | Stop reason: HOSPADM

## 2024-01-11 RX ADMIN — HYDROMORPHONE HYDROCHLORIDE 2 MG: 2 TABLET ORAL at 00:08

## 2024-01-11 RX ADMIN — LIDOCAINE 1 PATCH: 4 PATCH TOPICAL at 07:53

## 2024-01-11 RX ADMIN — PREGABALIN 25 MG: 25 CAPSULE ORAL at 10:51

## 2024-01-11 RX ADMIN — HYDROMORPHONE HYDROCHLORIDE 2 MG: 2 TABLET ORAL at 16:07

## 2024-01-11 RX ADMIN — HYDROMORPHONE HYDROCHLORIDE 2 MG: 2 TABLET ORAL at 12:44

## 2024-01-11 RX ADMIN — IBUPROFEN 800 MG: 400 TABLET ORAL at 16:07

## 2024-01-11 RX ADMIN — HYDROMORPHONE HYDROCHLORIDE 2 MG: 2 TABLET ORAL at 03:26

## 2024-01-11 RX ADMIN — SENNOSIDES AND DOCUSATE SODIUM 2 TABLET: 50; 8.6 TABLET ORAL at 07:54

## 2024-01-11 RX ADMIN — IBUPROFEN 800 MG: 400 TABLET ORAL at 03:27

## 2024-01-11 RX ADMIN — SIMETHICONE 80 MG: 80 TABLET, CHEWABLE ORAL at 00:08

## 2024-01-11 RX ADMIN — AMLODIPINE BESYLATE 10 MG: 10 TABLET ORAL at 07:54

## 2024-01-11 RX ADMIN — HYDROMORPHONE HYDROCHLORIDE 2 MG: 2 TABLET ORAL at 22:48

## 2024-01-11 RX ADMIN — HYDROMORPHONE HYDROCHLORIDE 2 MG: 2 TABLET ORAL at 19:38

## 2024-01-11 RX ADMIN — POLYETHYLENE GLYCOL 3350 17 G: 17 POWDER, FOR SOLUTION ORAL at 22:49

## 2024-01-11 RX ADMIN — POLYETHYLENE GLYCOL 3350 17 G: 17 POWDER, FOR SOLUTION ORAL at 18:40

## 2024-01-11 RX ADMIN — ACETAMINOPHEN 975 MG: 325 TABLET, FILM COATED ORAL at 18:40

## 2024-01-11 RX ADMIN — HYDROMORPHONE HYDROCHLORIDE 2 MG: 2 TABLET ORAL at 06:20

## 2024-01-11 RX ADMIN — HYDROMORPHONE HYDROCHLORIDE 2 MG: 2 TABLET ORAL at 09:41

## 2024-01-11 RX ADMIN — ACETAMINOPHEN 975 MG: 325 TABLET, FILM COATED ORAL at 00:07

## 2024-01-11 RX ADMIN — PREGABALIN 25 MG: 25 CAPSULE ORAL at 16:07

## 2024-01-11 RX ADMIN — ACETAMINOPHEN 975 MG: 325 TABLET, FILM COATED ORAL at 12:44

## 2024-01-11 RX ADMIN — ACETAMINOPHEN 975 MG: 325 TABLET, FILM COATED ORAL at 06:19

## 2024-01-11 RX ADMIN — IBUPROFEN 800 MG: 400 TABLET ORAL at 21:30

## 2024-01-11 RX ADMIN — IBUPROFEN 800 MG: 400 TABLET ORAL at 09:41

## 2024-01-11 RX ADMIN — SENNOSIDES AND DOCUSATE SODIUM 2 TABLET: 50; 8.6 TABLET ORAL at 19:37

## 2024-01-11 RX ADMIN — PREGABALIN 25 MG: 25 CAPSULE ORAL at 21:31

## 2024-01-11 ASSESSMENT — ACTIVITIES OF DAILY LIVING (ADL)
ADLS_ACUITY_SCORE: 18

## 2024-01-11 NOTE — PLAN OF CARE
"0900-Discussion was had with patient to prepare her for the possibility that she would remain inpatient-per MD/pt/RN discussion and that infant would be probably be discharged today and what that would look like. Discussed that if infant would be discharged he was no longer a patient of ours so that would mean it was required for an adult to be here with him at all times to care for infant and that infant could not be alone with mother in room once no longer a patient, we could no longer provide donor milk/feeding supplies/diapers/linens etc- and to make sure she had these items ready for later this afternoon. That she would need to get all these supplies for infant once he was discharged. He would continue to be able to use our bassinet. Pt sad, but agreeable and stated, \" I think the doctors will let us stay together\".   1040-Discharge order was placed for infant, Patient was notified and RN and pt had a conversation about how this was very difficult as they dont have a lot of family and friends in the area and her  who is a  is out of town and won't be back tonight. RN again stated this was the hospital procedure. At this time she said she might decide to be discharged but would think about it.  1200-Pt stated she was not feeling well enough to be discharged and between Grandma and the [facility] she was going to send the baby home with them. Rooming-in agreement was brought into room, gone over with patient and signed.   1330-Discharge paperwork for infant was gone over with pt, bands were cut and pt stated that someone would be here to pick infant up and bring home by 1500.  1530-RN asked for an updated timeframe on when someone would be coming to get the infant, with him being discharged he can't remain in the room without an adult present, pt stated someone was on the way.   1630-RN again inquired as to where the caregiver for the infant was, rooming-in policy was referenced again and stated if someone " "could not be available to be here to room-in with infant we would have to contact our CPS to make sure infant was safe. Pt sighed and stated \"Fine, I will call someone right now to come.\" See MD notification note regarding again, asking pt if she wanted to discharge.   1700-Pt had called, a different RN had answered call light and brought donor milk in for infant-was unaware that infant had been discharged.   1730-Jaylen Garcia RN and this writer went in to pt room to reiterate the policy an assess the situation. Caregiver (grandma and ) were in room. RN's stated we were glad the infant caregiver was here and stated again that we could no longer provide donor milk and other supplies for the infant. Pt stated, \"But he's hungry, I guess you will have to leave now then so you can feed him\" Grandma at current time was holding bottle with donor milk in it. RN brought up previous conversations we have had throughout the day that they must provide their own infant supplies. Grandma stated, \"oh this must be the nurse who was going to call CPS on you, for no reason, I can't wait to write my letter\". RN's acknowledged that we had a safe plan for baby and encouraged pt to call if she needed anything.   "

## 2024-01-11 NOTE — PROVIDER NOTIFICATION
MD Ravi notified regarding patient request for miralax-instructed to order. RN notified MD regarding pt's BP's have been 130's/90's range, no concern at this time, keep order parameters at 160/110 to notify. MD also notified that patient is unhappy with the fact that infant has been discharged and had to sign rooming in agreement. MD stated patient had contacted the clinic and also expressed frustrations with this. MD/RN asked patient if she would like to discharge tonight still and patient declined due to pain not being any better. Will continue to monitor.

## 2024-01-11 NOTE — PLAN OF CARE
Goal Outcome Evaluation:      Plan of Care Reviewed With: patient    Overall Patient Progress: improvingOverall Patient Progress: improving         VSS, fundus firm, scant flow, voiding adequately, ambulating independently. Pumping encouraged every 3 hours and bottle feeding infant ebm/donor milk. Patient states pain has been 6/10 while in bed at rest but goes up to a 7/10 with movement. Patient has appeared very comfortable throughout the shift, walking frequently, independently doing infant cares, sitting very comfortably in bed and moving in and out very well. Patient is using lidocaine patch, lyrica, dilaudid, tylenol and ibuprofen for pain control but states it has not improved today. Patients demeanor does not match her ratings of pain. Will continue to monitor.

## 2024-01-11 NOTE — PLAN OF CARE
Goal Outcome Evaluation:      Plan of Care Reviewed With: patient, spouse      Vital signs are stable.  BP was 144/95 at 1600.  Pt's is taking Dilaudid/Ibuprofen/Tylenol and Lidocaine patch for discomfort without much improvement. Denies headache/blurred vision/and epigastric pain. Up in room and walked in the hallways couple times. Abdominal binder on. Patient stated that she is getting frustrated.  Dr. Zamorano was notified with patient's frustration she is going to order different medication that they had discussed in the morning rounding  to manage her pain.  Pumping every three hours. Will continue to monitor.

## 2024-01-11 NOTE — PLAN OF CARE
Vital signs stable besides borderline BPs in the 130s/80s-90s. Denies headache, epigastric pain and blurry vision. Reflexes WDL and absence of clonus. Now rating pain 6/10. Stated that the Lyrica and dilaudid is providing some relief and that simethicone helped decrease abdominal pressure. Wearing abdominal binder for comfort. Ambulating well in room, ambulation in halls encouraged. Pumping and bottling EBM every 3hrs. Bonding well with infant and independent with infant cares.

## 2024-01-11 NOTE — DISCHARGE INSTRUCTIONS
Please call the office if you experience  - bleeding through more than a pad per hour persistently  - passage of blood clots greater than the size of velma  - abnormal vaginal discharge  - temperature greater than 100.4  - inability to tolerate food or fluid  - pain not controlled with oral medications  - persistent headache, vision changes, chest pain, shortness of breath, pain in the upper belly  - significant breast pain  - mood changes that affect your quality of life    Please make an appointment at [facility] in 1-2 weeks for blood pressure and incision checks and in 6 weeks for a postpartum visit.     Follow up in clinic 1/15/24 for incision dressing removal.    Warning Signs after Having a Baby    Keep this paper on your fridge or somewhere else where you can see it.    Call your provider if you have any of these symptoms up to 12 weeks after having your baby.    Thoughts of hurting yourself or your baby  Pain in your chest or trouble breathing  Severe headache not helped by pain medicine  Eyesight concerns (blurry vision, seeing spots or flashes of light, other changes to eyesight)  Fainting, shaking or other signs of a seizure    Call 9-1-1 if you feel that it is an emergency.     The symptoms below can happen to anyone after giving birth. They can be very serious. Call your provider if you have any of these warning signs.    My provider s phone number: _______________________    Losing too much blood (hemorrhage)    Call your provider if you soak through a pad in less than an hour or pass blood clots bigger than a golf ball. These may be signs that you are bleeding too much.    Blood clots in the legs or lungs    After you give birth, your body naturally clots its blood to help prevent blood loss. Sometimes this increased clotting can happen in other areas of the body, like the legs or lungs. This can block your blood flow and be very dangerous.     Call your provider if you:  Have a red, swollen spot  on the back of your leg that is warm or painful when you touch it.   Are coughing up blood.     Infection    Call your provider if you have any of these symptoms:  Fever of 100.4 F (38 C) or higher.  Pain or redness around your stitches if you had an incision.   Any yellow, white, or green fluid coming from places where you had stitches or surgery.    Mood Problems (postpartum depression)    Many people feel sad or have mood changes after having a baby. But for some people, these mood swings are worse.     Call your provider right away if you feel so anxious or nervous that you can't care for yourself or your baby.    Preeclampsia (high blood pressure)    Even if you didn't have high blood pressure when you were pregnant, you are at risk for the high blood pressure disease called preeclampsia. This risk can last up to 12 weeks after giving birth.     Call your provider if you have:   Pain on your right side under your rib cage  Sudden swelling in the hands and face    Remember: You know your body. If something doesn't feel right, get medical help.     For informational purposes only. Not to replace the advice of your health care provider. Copyright 2020 [employer]. All rights reserved. Clinically reviewed by Roseanne Hurley, RNC-OB, MSN. [employer] 247666 - Rev 02/23.

## 2024-01-11 NOTE — PROGRESS NOTES
"[facility]   POD4 s/p stat RLTCS and myomectomy    S: 43 year old  delivered at 36w5d   Doing well without complaints.  Pain has been a significant issue. Feels like Lyrica did improve things. This morning the pain is more incisional whereas before she did have deeper pain. The skin just feels very raw. Is not in a place right now where she'd be comfortable going home with her pain level and having to care for her children.   Lochia minimal.   Ambulating.  Urinating without issues.  Passing flatus. No BM.   Pumping and bottlefeeding.     O: BP (!) 128/93 (BP Location: Left arm)   Pulse 99   Temp 98.5  F (36.9  C) (Oral)   Resp 16   Ht 1.499 m (4' 11\")   Wt 52.2 kg (115 lb 1.6 oz)   LMP 2023   SpO2 97%   Breastfeeding Unknown   BMI 23.25 kg/m    Gen: NAD  Abd: soft, mild ttp superior to incision, ND  Incision: bandage c/d/i  Ext: NT, nonedematous    Hemoglobin   Date Value Ref Range Status   2024 10.3 (L) 11.7 - 15.7 g/dL Final   03/15/2019 9.6 (L) 11.7 - 15.7 g/dL Final   ]    A/P:  43 year old  POD4 s/p stat RLTCS and myomectomy due to cord prolapse after presenting with PPROM and PTL, patient also with CHT  - CHTN - BPs normal to mild range on pre-pregnancy amlodipine, labetalol was added during pregnancy and discontinued after delivery. Continue to monitor BPs.   - having some panic attack feelings in processing the delivery experience - mentally doing okay this morning  - acute blood loss anemia from  and myomectomy on chronic physiologic anemia of pregnancy - Hgb 10.3, asymptomatic, no need for medical intervention at this time  - continue ibuprofen, Tylenol, dilaudid, Lyrica  - support pumping - reviewed questions related to pumping, milk supply, galactogogues  - monitor lochia  - encourage ambulation  - regular diet  - incision appropriate  - routine PP care  - discharge pending pain control - potentially later today or tomorrow    Heide " MAGEN Ravi MD  Text Page (8am - 5pm)  1/11/2024 [id number]

## 2024-01-12 VITALS
OXYGEN SATURATION: 97 % | TEMPERATURE: 97.9 F | WEIGHT: 115.5 LBS | BODY MASS INDEX: 23.28 KG/M2 | RESPIRATION RATE: 18 BRPM | SYSTOLIC BLOOD PRESSURE: 132 MMHG | HEIGHT: 59 IN | DIASTOLIC BLOOD PRESSURE: 93 MMHG | HEART RATE: 87 BPM

## 2024-01-12 PROCEDURE — 250N000013 HC RX MED GY IP 250 OP 250 PS 637: Performed by: OBSTETRICS & GYNECOLOGY

## 2024-01-12 PROCEDURE — 250N000013 HC RX MED GY IP 250 OP 250 PS 637: Performed by: STUDENT IN AN ORGANIZED HEALTH CARE EDUCATION/TRAINING PROGRAM

## 2024-01-12 RX ORDER — LIDOCAINE 4 G/G
1 PATCH TOPICAL EVERY 24 HOURS
Qty: 1 PATCH | Refills: 0 | Status: SHIPPED | OUTPATIENT
Start: 2024-01-12

## 2024-01-12 RX ORDER — PREGABALIN 25 MG/1
25 CAPSULE ORAL 3 TIMES DAILY
Qty: 2 CAPSULE | Refills: 0 | Status: SHIPPED | OUTPATIENT
Start: 2024-01-12

## 2024-01-12 RX ADMIN — ACETAMINOPHEN 975 MG: 325 TABLET, FILM COATED ORAL at 07:12

## 2024-01-12 RX ADMIN — AMLODIPINE BESYLATE 10 MG: 10 TABLET ORAL at 07:56

## 2024-01-12 RX ADMIN — HYDROMORPHONE HYDROCHLORIDE 2 MG: 2 TABLET ORAL at 11:24

## 2024-01-12 RX ADMIN — IBUPROFEN 800 MG: 400 TABLET ORAL at 09:44

## 2024-01-12 RX ADMIN — LIDOCAINE 1 PATCH: 4 PATCH TOPICAL at 07:56

## 2024-01-12 RX ADMIN — SENNOSIDES AND DOCUSATE SODIUM 2 TABLET: 50; 8.6 TABLET ORAL at 07:55

## 2024-01-12 RX ADMIN — IBUPROFEN 800 MG: 400 TABLET ORAL at 03:51

## 2024-01-12 RX ADMIN — ACETAMINOPHEN 975 MG: 325 TABLET, FILM COATED ORAL at 00:48

## 2024-01-12 RX ADMIN — HYDROMORPHONE HYDROCHLORIDE 2 MG: 2 TABLET ORAL at 08:22

## 2024-01-12 RX ADMIN — HYDROMORPHONE HYDROCHLORIDE 2 MG: 2 TABLET ORAL at 01:48

## 2024-01-12 RX ADMIN — PREGABALIN 25 MG: 25 CAPSULE ORAL at 08:49

## 2024-01-12 RX ADMIN — HYDROMORPHONE HYDROCHLORIDE 2 MG: 2 TABLET ORAL at 05:14

## 2024-01-12 RX ADMIN — POLYETHYLENE GLYCOL 3350 17 G: 17 POWDER, FOR SOLUTION ORAL at 08:02

## 2024-01-12 ASSESSMENT — ACTIVITIES OF DAILY LIVING (ADL)
ADLS_ACUITY_SCORE: 18

## 2024-01-12 NOTE — PLAN OF CARE
Goal Outcome Evaluation:      Plan of Care Reviewed With: patient    Overall Patient Progress: improvingOverall Patient Progress: improving       D: VSS, assessments WDL. Patient stated she did not want her aquacel dressing removed prior to discharge. States she will go to the clinic on 1/15/24 for removal per discussion with her provider yesterday.  I: Pt. received complete discharge paperwork and home medications to  at home pharmacy. Pt. was given times of last dose for all discharge medications in writing on discharge medication sheets. Discharge teaching included home medication, pain management, activity restrictions, postpartum cares, and signs and symptoms of infection.    A: Discharge outcomes on care plan met. Patient states understanding and comfort with self care and follow up care.   P: Pt. Discharged. Pt. was accompanied by  and left with personal belongings. Pt. to follow up with OB provider per discharge instructions.  Pt. had no further questions at the time of discharge and no unmet needs were identified.

## 2024-01-12 NOTE — PLAN OF CARE
Vitals stable and assessments WDL. Denies headache, epigastric pain, shortness of breath and blurry vision.  incision dressing CDI. Rating pain between 5-7 out of 10 throughout shift, pain increases with movement. Taking scheduled tylenol, ibuprofen, Lyrica and q3hr dilaudid around the clock. Patient moving around well in room and main, getting in and out of bed quickly and efficiently and doing self care independently.  Verbalizes that lidocaine patch on back was helpful. Stated that mirolax was helpful and that abdominal pressure decreased after a bowel movement which provided relief. Pumping every 3hrs.

## 2024-01-12 NOTE — PROVIDER NOTIFICATION
01/12/24 1030   Provider Notification   Provider Name/Title Dr. Zamorano   Method of Notification Phone   Request Evaluate-Remote   Notification Reason Medication Request     Dr. Zamorano notified per patient request for prescription for Dilaudid for discharge as patients home pharmacy does not have medication and patient wants to take prescription with to fill at another pharmacy. Per MD will print script at the office and bring back to hospital.

## 2024-01-12 NOTE — PROGRESS NOTES
"Maegan German  2024   POD5 s/p stat RLTCS and myomectomy    S: 43 year old  delivered at 36w5d   Doing well without complaints.  Pain has been a significant issue. Feels like Lyrica did improve things and has 2 doses left after this morning's dose. She now feels comfortable going home with her pain level and having to care for her children.   Lochia minimal.   Ambulating.  Urinating without issues.  Passing flatus. Had BM yesterday which was very helpful  Pumping and bottlefeeding.Baby discharged home yesterday     O: /88 (BP Location: Left arm, Patient Position: Sitting, Cuff Size: Adult Regular)   Pulse 87   Temp 97.6  F (36.4  C) (Oral)   Resp 16   Ht 1.499 m (4' 11\")   Wt 52.4 kg (115 lb 8 oz)   LMP 2023   SpO2 97%   Breastfeeding Unknown   BMI 23.33 kg/m    Gen: NAD  Abd: soft, mild ttp superior to incision, ND  Incision: bandage c/d/i  Ext: NT, nonedematous    Hemoglobin   Date Value Ref Range Status   2024 10.3 (L) 11.7 - 15.7 g/dL Final   03/15/2019 9.6 (L) 11.7 - 15.7 g/dL Final   ]    A/P:  43 year old  POD5 s/p stat RLTCS and myomectomy due to cord prolapse after presenting with PPROM and PTL, patient also with CHT  - CHTN - BPs normal to mild range on pre-pregnancy amlodipine, labetalol was added during pregnancy and discontinued after delivery. Continue to monitor BPs.   - acute blood loss anemia from  and myomectomy on chronic physiologic anemia of pregnancy - Hgb 10.3, asymptomatic, no need for medical intervention at this time  - continue ibuprofen, Tylenol, dilaudid, Lyrica  - support pumping - reviewed questions related to pumping, milk supply, galactogogues  - monitor lochia  - encourage ambulation  - regular diet  - incision appropriate- remove aquacel prior to discharge today  - routine PP care  - discharge today    Tao Zamorano MD   "

## 2024-01-24 NOTE — DISCHARGE SUMMARY
[facility] Discharge Summary    Maegan German MRN# [id number]   Age: 43 year old YOB: 1980     Date of Admission:  2024  Date of Discharge::  2024 12:06 PM  Admitting Physician:  Nida Mcmanus MD  Discharge Physician:  Tao Zamorano MD     Home clinic: [facility]          Admission Diagnoses:   1. IUP at 36w5d  2. PPROM  3. PTL  4. CHTN  5. GDMA1  6. Breech malpresentation  7. History of CS x2  8. Cord prolapse  9. Fibroid uterus  10. Desires revision of keloid pfannenstiel scar          Discharge Diagnosis:   same          Procedures:   Procedure(s): Repeat low transverse  section   Myomectomy   Revision of keloid pfannenstiel scar       No other procedures performed during this admission           Medications Prior to Admission:     No medications prior to admission.             Discharge Medications:     Discharge Medication List as of 2024 11:26 AM      START taking these medications    Details   HYDROmorphone (DILAUDID) 2 MG tablet Take 1 tablet (2 mg) by mouth every 3 hours as needed for severe pain, Disp-30 tablet, R-0, E-Prescribe      Lidocaine (LIDOCARE) 4 % Patch Place 1 patch onto the skin every 24 hours To prevent lidocaine toxicity, patient should be patch free for 12 hrs daily.Disp-1 patch, [id number]         CONTINUE these medications which have CHANGED    Details   pregabalin (LYRICA) 25 MG capsule Take 1 capsule (25 mg) by mouth 3 times daily, Disp-2 capsule, R-0, Local Print         CONTINUE these medications which have NOT CHANGED    Details   amLODIPine (NORVASC) 10 MG tablet Take 10 mg by mouth daily, Historical      folic acid (FOLVITE) 400 MCG tablet Take 400 mcg by mouth daily, Historical      Prenatal Vit-Fe Fumarate-FA (PNV PRENATAL PLUS MULTIVITAMIN) 27-1 MG TABS per tablet Take 1 tablet by mouth daily, Historical         STOP taking these medications       aspirin 81 MG EC tablet Comments:   Reason for Stopping:          ferrous sulfate dried 160 (50 Fe) MG tablet Comments:   Reason for Stopping:         labetalol (NORMODYNE) 300 MG tablet Comments:   Reason for Stopping:                     Consultations:   No consultations were requested during this admission          Brief History of Labor or Admission:   43 year old  at 36w5d weeks, who presented on 2024 for  PPROM. Since presentation she is starting to have ctx as well. Her pregnancy has been complicated by poorly controlled CHTN. She continued on her pre-pregnancy dose of amlodipine 10 mg daily during pregnancy given her CHTN has historically been difficult to control before pregnancy and in previous pregnancies. Her BP had been very well controlled until about 32 weeks. She ultimately was admitted to the hospital for observation and with superimposed preeclampsia ruled out was initiated on labetalol 100 mg BID 2023. She received BMZ during that admission  and . The labetalol was increased to 200 mg BID a few days later upon office follow-up. She was again admitted  to  for BP control with meds adjusted to amlodipine 10 mg daily and now labetalol 300 mg BID. She also has well controlled GDMA1. No vision changes, CP, SOB, RUQ/epigastric pain. No ctx, LOF, VB. +FM.   Otherwise in this pregnancy, she had low risk NIPS for genetic screening - XY aware, she is managing GDM with diet, and EFW by ultrasound was 4#1 (27%ile) at 32 weeks. Fetus is breech and she has planned RLTCS regardless.      She started to experience contractions around 11 am, and initial cervical exam was /high and posterior.            Hospital Course:   The patient's hospital course was unremarkable.  She recovered as anticipated and experienced no post-operative complications.    On discharge, her pain was well controlled.   Vaginal bleeding is similar to peak menstrual flow.  Voiding without difficulty.  Ambulating well and tolerating a normal diet.  No fever or  significant wound drainage.  Breastfeeding   well.  Infant is stable.  No bowel movement yet.    She was discharged on post-partum day # 5.    Post-partum hemoglobin:   Hemoglobin   Date Value Ref Range Status   01/08/2024 10.3 (L) 11.7 - 15.7 g/dL Final   03/15/2019 9.6 (L) 11.7 - 15.7 g/dL Final             Discharge Instructions and Follow-Up:   Discharge diet: Regular   Discharge activity: Lifting restricted to 15 pounds  No lifting, driving, or strenuous exercise for 6 week(s)   Discharge follow-up: Follow up with Dr. Ravi in 1-2 weeks   Wound care: Drink plenty of fluids  Ice to area for comfort  Keep wound clean and dry           Discharge Disposition:   Discharged to home      Attestation:  Amount of time performed on this discharge summary: 10 minutes.    Tao Zamorano MD

## 2024-03-24 ENCOUNTER — HEALTH MAINTENANCE LETTER (OUTPATIENT)
Age: 44
End: 2024-03-24

## 2024-06-02 ENCOUNTER — HEALTH MAINTENANCE LETTER (OUTPATIENT)
Age: 44
End: 2024-06-02

## 2024-06-27 NOTE — PROGRESS NOTES
"Please see \"Imaging\" tab under \"Chart Review\" for details of today's US.    Sri Valverde, DO    " ----- Message from Anthony Alejo 

## 2024-10-16 NOTE — ANESTHESIA POSTPROCEDURE EVALUATION
Patient: Maegan German    Procedure(s):   SECTION    Diagnosis:pregnancy  Diagnosis Additional Information: No value filed.    Anesthesia Type:  Spinal    Note:  Anesthesia Post Evaluation    Patient location during evaluation: Bedside  Patient participation: Able to participate in evaluation but full recovery from regional anesthesia has not yet ocurrred but is anticipated to occur within 48 hours  Level of consciousness: awake and alert  Pain management: adequate  Airway patency: patent  Cardiovascular status: acceptable  Respiratory status: acceptable  Hydration status: acceptable  PONV: none             Last vitals:  Vitals:    19 1015 19 1030 19 1045   BP: 117/75 104/80    Resp: 16 16 16   Temp:      SpO2: 99% 99% 99%         Electronically Signed By: Robin Rodriguez MD  2019  10:46 AM   Attending Only

## 2025-06-12 NOTE — NURSING NOTE
NSTperformed due to FGR.  Dr. Rodriguez reviewed efm tracing. See NST/BPP Doc Flowsheet tab. Refer to MD notes in MFM ultrasound report from today.  Continue 2x/week surveillance.  
Admission

## 2025-06-15 ENCOUNTER — HEALTH MAINTENANCE LETTER (OUTPATIENT)
Age: 45
End: 2025-06-15

## (undated) DEVICE — LINEN TOWEL PACK X5 5464

## (undated) DEVICE — DRAPE SHEET REV FOLD 3/4 9349

## (undated) DEVICE — SOL WATER IRRIG 1000ML BOTTLE 07139-09

## (undated) DEVICE — SUCTION CANISTER MEDIVAC LINER 3000ML W/LID 65651-530

## (undated) DEVICE — PAD CHUX UNDERPAD 23X24" 7136

## (undated) DEVICE — DRSG KERLIX FLUFFS X5

## (undated) DEVICE — PACK SET-UP STD 9102

## (undated) DEVICE — SOL NACL 0.9% IRRIG 1000ML BOTTLE 07138-09

## (undated) DEVICE — LINEN BABY BLANKET 5434

## (undated) DEVICE — BLADE CLIPPER 4406

## (undated) DEVICE — STPL SKIN PROXIMATE 35 WIDE PMW35

## (undated) DEVICE — SURGICEL POWDER ABSORBABLE HEMOSTAT 3GM 3013SP

## (undated) DEVICE — PREP CHLORAPREP 26ML TINTED ORANGE  260815

## (undated) DEVICE — DRSG AQUACEL AG HYDROFIBER  3.5X10" 422605

## (undated) DEVICE — SU PROLENE 2-0 SHDA 48" 8533H

## (undated) DEVICE — LIGHT HANDLE X2

## (undated) DEVICE — PACK C-SECTION LF PL15OTA83B

## (undated) DEVICE — ESU GROUND PAD UNIVERSAL W/O CORD

## (undated) RX ORDER — PROPOFOL 10 MG/ML
INJECTION, EMULSION INTRAVENOUS
Status: DISPENSED
Start: 2024-01-07

## (undated) RX ORDER — OXYTOCIN/0.9 % SODIUM CHLORIDE 30/500 ML
PLASTIC BAG, INJECTION (ML) INTRAVENOUS
Status: DISPENSED
Start: 2024-01-07

## (undated) RX ORDER — OXYTOCIN/0.9 % SODIUM CHLORIDE 30/500 ML
PLASTIC BAG, INJECTION (ML) INTRAVENOUS
Status: DISPENSED
Start: 2021-09-30

## (undated) RX ORDER — MORPHINE SULFATE 1 MG/ML
INJECTION, SOLUTION EPIDURAL; INTRATHECAL; INTRAVENOUS
Status: DISPENSED
Start: 2024-01-07

## (undated) RX ORDER — FENTANYL CITRATE 50 UG/ML
INJECTION, SOLUTION INTRAMUSCULAR; INTRAVENOUS
Status: DISPENSED
Start: 2024-01-07

## (undated) RX ORDER — FENTANYL CITRATE 0.05 MG/ML
INJECTION, SOLUTION INTRAMUSCULAR; INTRAVENOUS
Status: DISPENSED
Start: 2024-01-07

## (undated) RX ORDER — MORPHINE SULFATE 1 MG/ML
INJECTION, SOLUTION EPIDURAL; INTRATHECAL; INTRAVENOUS
Status: DISPENSED
Start: 2021-09-30

## (undated) RX ORDER — FENTANYL CITRATE 50 UG/ML
INJECTION, SOLUTION INTRAMUSCULAR; INTRAVENOUS
Status: DISPENSED
Start: 2021-09-30